# Patient Record
Sex: FEMALE | Race: WHITE | NOT HISPANIC OR LATINO | Employment: OTHER | RURAL
[De-identification: names, ages, dates, MRNs, and addresses within clinical notes are randomized per-mention and may not be internally consistent; named-entity substitution may affect disease eponyms.]

---

## 2019-12-10 ENCOUNTER — HISTORICAL (OUTPATIENT)
Dept: ADMINISTRATIVE | Facility: HOSPITAL | Age: 59
End: 2019-12-10

## 2019-12-11 LAB
LAB AP CLINICAL INFORMATION: NORMAL
LAB AP DIAGNOSIS - HISTORICAL: NORMAL
LAB AP GROSS PATHOLOGY - HISTORICAL: NORMAL
LAB AP SPECIMEN SUBMITTED - HISTORICAL: NORMAL

## 2020-02-25 ENCOUNTER — HISTORICAL (OUTPATIENT)
Dept: ADMINISTRATIVE | Facility: HOSPITAL | Age: 60
End: 2020-02-25

## 2020-02-25 LAB — PAP SMEAR: NORMAL

## 2020-02-27 LAB
LAB AP CLINICAL INFORMATION: NORMAL
LAB AP COMMENTS: NORMAL
LAB AP GENERAL CAT - HISTORICAL: NORMAL
LAB AP INTERPRETATION/RESULT - HISTORICAL: NEGATIVE
LAB AP SPECIMEN ADEQUACY - HISTORICAL: NORMAL
LAB AP SPECIMEN SUBMITTED - HISTORICAL: NORMAL

## 2020-03-16 ENCOUNTER — HISTORICAL (OUTPATIENT)
Dept: ADMINISTRATIVE | Facility: HOSPITAL | Age: 60
End: 2020-03-16

## 2020-03-18 LAB
REPORT: 25
REPORT: NORMAL

## 2020-03-31 ENCOUNTER — HISTORICAL (OUTPATIENT)
Dept: ADMINISTRATIVE | Facility: HOSPITAL | Age: 60
End: 2020-03-31

## 2020-10-15 ENCOUNTER — HISTORICAL (OUTPATIENT)
Dept: ADMINISTRATIVE | Facility: HOSPITAL | Age: 60
End: 2020-10-15

## 2020-10-15 LAB
ALBUMIN SERPL BCP-MCNC: 4 G/DL (ref 3.5–5)
ALBUMIN/GLOB SERPL: 0.9 {RATIO}
ALP SERPL-CCNC: 126 U/L (ref 46–118)
ALT SERPL W P-5'-P-CCNC: 18 U/L (ref 13–56)
ANION GAP SERPL CALCULATED.3IONS-SCNC: 8.3 MMOL/L (ref 7–16)
AST SERPL W P-5'-P-CCNC: 14 U/L (ref 15–37)
BASOPHILS # BLD AUTO: 0.05 X10E3/UL (ref 0–0.2)
BASOPHILS NFR BLD AUTO: 0.8 % (ref 0–1)
BILIRUB SERPL-MCNC: 0.4 MG/DL (ref 0–1.2)
BUN SERPL-MCNC: 26 MG/DL (ref 7–18)
BUN/CREAT SERPL: 17
CALCIUM SERPL-MCNC: 9.1 MG/DL (ref 8.5–10.1)
CHLORIDE SERPL-SCNC: 107 MMOL/L (ref 98–107)
CHOLEST SERPL-MCNC: 175 MG/DL
CHOLEST/HDLC SERPL: 2.8 {RATIO}
CO2 SERPL-SCNC: 28 MMOL/L (ref 21–32)
CREAT SERPL-MCNC: 1.54 MG/DL (ref 0.5–1.02)
EOSINOPHIL # BLD AUTO: 0.18 X10E3/UL (ref 0–0.5)
EOSINOPHIL NFR BLD AUTO: 2.8 % (ref 1–4)
ERYTHROCYTE [DISTWIDTH] IN BLOOD BY AUTOMATED COUNT: 13.2 % (ref 11.5–14.5)
GLOBULIN SER-MCNC: 4.4 G/DL (ref 2–4)
GLUCOSE SERPL-MCNC: 91 MG/DL (ref 74–106)
HCT VFR BLD AUTO: 40.7 % (ref 38–47)
HDLC SERPL-MCNC: 62 MG/DL
HGB BLD-MCNC: 12.7 G/DL (ref 12–16)
IMM GRANULOCYTES # BLD AUTO: 0.02 X10E3/UL (ref 0–0.04)
IMM GRANULOCYTES NFR BLD: 0.3 % (ref 0–0.4)
LDLC SERPL CALC-MCNC: 96 MG/DL
LYMPHOCYTES # BLD AUTO: 1.89 X10E3/UL (ref 1–4.8)
LYMPHOCYTES NFR BLD AUTO: 29.3 % (ref 27–41)
MAGNESIUM SERPL-MCNC: 2.5 MG/DL (ref 1.7–2.3)
MCH RBC QN AUTO: 26.9 PG (ref 27–31)
MCHC RBC AUTO-ENTMCNC: 31.2 G/DL (ref 32–36)
MCV RBC AUTO: 86.2 FL (ref 80–96)
MONOCYTES # BLD AUTO: 0.69 X10E3/UL (ref 0–0.8)
MONOCYTES NFR BLD AUTO: 10.7 % (ref 2–6)
MPC BLD CALC-MCNC: 9.9 FL (ref 9.4–12.4)
NEUTROPHILS # BLD AUTO: 3.63 X10E3/UL (ref 1.8–7.7)
NEUTROPHILS NFR BLD AUTO: 56.1 % (ref 53–65)
NRBC # BLD AUTO: 0 X10E3/UL (ref 0–0)
NRBC, AUTO (.00): 0 /100 (ref 0–0)
PLATELET # BLD AUTO: 299 X10E3/UL (ref 150–400)
POTASSIUM SERPL-SCNC: 5.3 MMOL/L (ref 3.5–5.1)
PROT SERPL-MCNC: 8.4 G/DL (ref 6.4–8.2)
RBC # BLD AUTO: 4.72 X10E6/UL (ref 4.2–5.4)
SODIUM SERPL-SCNC: 138 MMOL/L (ref 136–145)
TRIGL SERPL-MCNC: 87 MG/DL
WBC # BLD AUTO: 6.46 X10E3/UL (ref 4.5–11)

## 2020-11-09 ENCOUNTER — HISTORICAL (OUTPATIENT)
Dept: ADMINISTRATIVE | Facility: HOSPITAL | Age: 60
End: 2020-11-09

## 2021-02-22 ENCOUNTER — HISTORICAL (OUTPATIENT)
Dept: ADMINISTRATIVE | Facility: HOSPITAL | Age: 61
End: 2021-02-22

## 2021-03-25 ENCOUNTER — TELEPHONE (OUTPATIENT)
Dept: DERMATOLOGY | Facility: CLINIC | Age: 61
End: 2021-03-25

## 2021-03-25 RX ORDER — PREDNISONE 10 MG/1
TABLET ORAL
Qty: 30 TABLET | Refills: 0 | Status: SHIPPED | OUTPATIENT
Start: 2021-03-25 | End: 2021-06-11

## 2021-03-31 ENCOUNTER — DOCUMENTATION ONLY (OUTPATIENT)
Dept: DERMATOLOGY | Facility: CLINIC | Age: 61
End: 2021-03-31

## 2021-04-08 RX ORDER — CALCIPOTRIENE AND BETAMETHASONE DIPROPIONATE 50; .5 UG/G; MG/G
SUSPENSION TOPICAL
Qty: 120 G | Refills: 3 | Status: SHIPPED | OUTPATIENT
Start: 2021-04-08 | End: 2021-06-16 | Stop reason: CLARIF

## 2021-04-13 DIAGNOSIS — L30.9 DERMATITIS, UNSPECIFIED: Primary | ICD-10-CM

## 2021-04-13 RX ORDER — CALCIPOTRIENE, BETAMETHASONE DIPROPIONATE 50; .643 UG/G; MG/G
OINTMENT TOPICAL
Qty: 60 G | Refills: 2 | Status: SHIPPED | OUTPATIENT
Start: 2021-04-13 | End: 2021-08-23

## 2021-04-14 RX ORDER — BENAZEPRIL HYDROCHLORIDE 20 MG/1
20 TABLET ORAL DAILY
COMMUNITY
End: 2021-04-14 | Stop reason: SDUPTHER

## 2021-04-14 RX ORDER — OMEPRAZOLE 20 MG/1
20 CAPSULE, DELAYED RELEASE ORAL DAILY
COMMUNITY
End: 2021-04-14 | Stop reason: SDUPTHER

## 2021-04-15 RX ORDER — OMEPRAZOLE 20 MG/1
20 CAPSULE, DELAYED RELEASE ORAL DAILY
Qty: 90 CAPSULE | Refills: 1 | Status: SHIPPED | OUTPATIENT
Start: 2021-04-15 | End: 2021-10-04

## 2021-04-15 RX ORDER — BENAZEPRIL HYDROCHLORIDE 20 MG/1
20 TABLET ORAL DAILY
Qty: 90 TABLET | Refills: 1 | Status: SHIPPED | OUTPATIENT
Start: 2021-04-15 | End: 2021-09-16

## 2021-04-23 RX ORDER — EZETIMIBE 10 MG/1
10 TABLET ORAL NIGHTLY
COMMUNITY
End: 2021-04-23 | Stop reason: SDUPTHER

## 2021-04-23 RX ORDER — EZETIMIBE 10 MG/1
10 TABLET ORAL NIGHTLY
Qty: 90 TABLET | Refills: 1 | Status: SHIPPED | OUTPATIENT
Start: 2021-04-23 | End: 2021-10-28 | Stop reason: SDUPTHER

## 2021-05-27 ENCOUNTER — OFFICE VISIT (OUTPATIENT)
Dept: FAMILY MEDICINE | Facility: CLINIC | Age: 61
End: 2021-05-27
Payer: COMMERCIAL

## 2021-05-27 VITALS
SYSTOLIC BLOOD PRESSURE: 104 MMHG | OXYGEN SATURATION: 98 % | HEART RATE: 91 BPM | HEIGHT: 67 IN | RESPIRATION RATE: 18 BRPM | DIASTOLIC BLOOD PRESSURE: 78 MMHG | WEIGHT: 207 LBS | BODY MASS INDEX: 32.49 KG/M2

## 2021-05-27 DIAGNOSIS — E78.5 HYPERLIPIDEMIA, UNSPECIFIED HYPERLIPIDEMIA TYPE: ICD-10-CM

## 2021-05-27 DIAGNOSIS — N95.1 SWEATS, MENOPAUSAL: ICD-10-CM

## 2021-05-27 DIAGNOSIS — L60.0 INGROWN TOENAIL OF LEFT FOOT: ICD-10-CM

## 2021-05-27 DIAGNOSIS — K21.9 GASTROESOPHAGEAL REFLUX DISEASE, UNSPECIFIED WHETHER ESOPHAGITIS PRESENT: ICD-10-CM

## 2021-05-27 DIAGNOSIS — I10 HYPERTENSION, UNSPECIFIED TYPE: Primary | ICD-10-CM

## 2021-05-27 PROCEDURE — 99213 OFFICE O/P EST LOW 20 MIN: CPT | Mod: ,,, | Performed by: FAMILY MEDICINE

## 2021-05-27 PROCEDURE — 99213 PR OFFICE/OUTPT VISIT, EST, LEVL III, 20-29 MIN: ICD-10-PCS | Mod: ,,, | Performed by: FAMILY MEDICINE

## 2021-05-27 RX ORDER — TRAZODONE HYDROCHLORIDE 50 MG/1
TABLET ORAL
COMMUNITY
Start: 2021-05-01 | End: 2021-10-26

## 2021-05-27 RX ORDER — AMLODIPINE BESYLATE 2.5 MG/1
2.5 TABLET ORAL DAILY
COMMUNITY
Start: 2021-05-13 | End: 2021-11-15

## 2021-05-27 RX ORDER — TIZANIDINE 4 MG/1
4 TABLET ORAL 2 TIMES DAILY
COMMUNITY
End: 2021-07-06 | Stop reason: SDUPTHER

## 2021-05-27 RX ORDER — OXYCODONE AND ACETAMINOPHEN 10; 325 MG/1; MG/1
1 TABLET ORAL EVERY 8 HOURS
COMMUNITY
End: 2021-06-16

## 2021-05-27 RX ORDER — BUPROPION HYDROCHLORIDE 100 MG/1
100 TABLET ORAL 2 TIMES DAILY
COMMUNITY
End: 2021-06-07 | Stop reason: SDUPTHER

## 2021-05-27 RX ORDER — USTEKINUMAB 45 MG/.5ML
INJECTION, SOLUTION SUBCUTANEOUS
COMMUNITY
Start: 2021-05-26 | End: 2022-07-29

## 2021-06-07 ENCOUNTER — TELEPHONE (OUTPATIENT)
Dept: FAMILY MEDICINE | Facility: CLINIC | Age: 61
End: 2021-06-07

## 2021-06-07 RX ORDER — TRIAMCINOLONE ACETONIDE 1 MG/G
CREAM TOPICAL
COMMUNITY
End: 2021-06-16

## 2021-06-07 RX ORDER — BETAMETHASONE DIPROPIONATE 0.5 MG/G
OINTMENT, AUGMENTED TOPICAL
COMMUNITY
End: 2021-06-16

## 2021-06-07 RX ORDER — BUPROPION HYDROCHLORIDE 100 MG/1
100 TABLET ORAL 2 TIMES DAILY
Qty: 180 TABLET | Refills: 1 | Status: SHIPPED | OUTPATIENT
Start: 2021-06-07 | End: 2021-06-11 | Stop reason: SDUPTHER

## 2021-06-07 RX ORDER — VALACYCLOVIR HYDROCHLORIDE 1 G/1
TABLET, FILM COATED ORAL
COMMUNITY
Start: 2021-02-10 | End: 2021-08-09

## 2021-06-07 RX ORDER — SECUKINUMAB 150 MG/ML
INJECTION SUBCUTANEOUS
COMMUNITY
Start: 2021-02-01 | End: 2021-06-16

## 2021-06-07 RX ORDER — MELATONIN 3 MG
CAPSULE ORAL
COMMUNITY
End: 2021-10-26

## 2021-06-07 RX ORDER — DESONIDE 0.5 MG/G
CREAM TOPICAL
COMMUNITY
End: 2022-02-09 | Stop reason: ALTCHOICE

## 2021-06-07 RX ORDER — IBUPROFEN 800 MG/1
800 TABLET ORAL
COMMUNITY
End: 2021-06-16

## 2021-06-07 RX ORDER — CALCIPOTRIENE 50 UG/G
OINTMENT TOPICAL
COMMUNITY
Start: 2020-12-24 | End: 2021-06-16

## 2021-06-07 RX ORDER — BUPRENORPHINE HYDROCHLORIDE AND NALOXONE HYDROCHLORIDE DIHYDRATE 2; .5 MG/1; MG/1
TABLET SUBLINGUAL EVERY 6 HOURS PRN
COMMUNITY
End: 2021-06-16

## 2021-06-07 RX ORDER — HYDROCODONE BITARTRATE AND ACETAMINOPHEN 10; 325 MG/1; MG/1
1 TABLET ORAL EVERY 6 HOURS PRN
COMMUNITY
End: 2021-06-11

## 2021-06-07 RX ORDER — DOXYCYCLINE 100 MG/1
CAPSULE ORAL
COMMUNITY
Start: 2021-03-10 | End: 2021-06-16

## 2021-06-07 RX ORDER — GABAPENTIN 300 MG/1
300 CAPSULE ORAL
COMMUNITY
End: 2021-06-16

## 2021-06-07 RX ORDER — FAMOTIDINE 20 MG/1
TABLET, FILM COATED ORAL
COMMUNITY
End: 2021-10-26

## 2021-06-07 RX ORDER — ONDANSETRON 4 MG/1
4 TABLET, FILM COATED ORAL 3 TIMES DAILY PRN
COMMUNITY
Start: 2021-01-31 | End: 2021-12-02

## 2021-06-07 RX ORDER — CLOBETASOL PROPIONATE 0.5 MG/G
OINTMENT TOPICAL
COMMUNITY
Start: 2020-07-07 | End: 2021-06-16

## 2021-06-09 ENCOUNTER — OFFICE VISIT (OUTPATIENT)
Dept: DERMATOLOGY | Facility: CLINIC | Age: 61
End: 2021-06-09
Payer: COMMERCIAL

## 2021-06-09 VITALS — WEIGHT: 200 LBS | RESPIRATION RATE: 18 BRPM | BODY MASS INDEX: 31.39 KG/M2 | HEIGHT: 67 IN

## 2021-06-09 DIAGNOSIS — L23.89 ALLERGIC CONTACT DERMATITIS DUE TO OTHER AGENTS: Primary | ICD-10-CM

## 2021-06-09 DIAGNOSIS — L28.1 PRURIGO NODULARIS: ICD-10-CM

## 2021-06-09 DIAGNOSIS — L40.9 PSORIASIS: ICD-10-CM

## 2021-06-09 PROCEDURE — 99213 PR OFFICE/OUTPT VISIT, EST, LEVL III, 20-29 MIN: ICD-10-PCS | Mod: ,,, | Performed by: DERMATOLOGY

## 2021-06-09 PROCEDURE — 99213 OFFICE O/P EST LOW 20 MIN: CPT | Mod: ,,, | Performed by: DERMATOLOGY

## 2021-06-11 ENCOUNTER — OFFICE VISIT (OUTPATIENT)
Dept: FAMILY MEDICINE | Facility: CLINIC | Age: 61
End: 2021-06-11
Payer: COMMERCIAL

## 2021-06-11 VITALS
HEART RATE: 85 BPM | OXYGEN SATURATION: 99 % | SYSTOLIC BLOOD PRESSURE: 130 MMHG | DIASTOLIC BLOOD PRESSURE: 88 MMHG | RESPIRATION RATE: 20 BRPM | WEIGHT: 200 LBS | BODY MASS INDEX: 31.39 KG/M2 | HEIGHT: 67 IN

## 2021-06-11 DIAGNOSIS — F32.A DEPRESSION, UNSPECIFIED DEPRESSION TYPE: Primary | ICD-10-CM

## 2021-06-11 DIAGNOSIS — I10 HYPERTENSION, UNSPECIFIED TYPE: ICD-10-CM

## 2021-06-11 DIAGNOSIS — L29.9 PRURITUS: ICD-10-CM

## 2021-06-11 PROCEDURE — 99213 OFFICE O/P EST LOW 20 MIN: CPT | Mod: ,,, | Performed by: FAMILY MEDICINE

## 2021-06-11 PROCEDURE — 99213 PR OFFICE/OUTPT VISIT, EST, LEVL III, 20-29 MIN: ICD-10-PCS | Mod: ,,, | Performed by: FAMILY MEDICINE

## 2021-06-11 RX ORDER — BUPROPION HYDROCHLORIDE 100 MG/1
100 TABLET ORAL 2 TIMES DAILY
Qty: 180 TABLET | Refills: 1 | Status: SHIPPED | OUTPATIENT
Start: 2021-06-11 | End: 2023-01-30 | Stop reason: SDUPTHER

## 2021-06-11 RX ORDER — HYDROXYZINE PAMOATE 25 MG/1
CAPSULE ORAL
Qty: 60 CAPSULE | Refills: 0 | Status: SHIPPED | OUTPATIENT
Start: 2021-06-11 | End: 2021-08-09

## 2021-06-16 ENCOUNTER — OFFICE VISIT (OUTPATIENT)
Dept: FAMILY MEDICINE | Facility: CLINIC | Age: 61
End: 2021-06-16
Payer: COMMERCIAL

## 2021-06-16 VITALS
WEIGHT: 200 LBS | SYSTOLIC BLOOD PRESSURE: 136 MMHG | BODY MASS INDEX: 31.39 KG/M2 | HEART RATE: 89 BPM | TEMPERATURE: 98 F | RESPIRATION RATE: 18 BRPM | HEIGHT: 67 IN | DIASTOLIC BLOOD PRESSURE: 95 MMHG | OXYGEN SATURATION: 100 %

## 2021-06-16 DIAGNOSIS — J40 BRONCHITIS: Primary | ICD-10-CM

## 2021-06-16 PROCEDURE — 96372 THER/PROPH/DIAG INJ SC/IM: CPT | Mod: ,,, | Performed by: NURSE PRACTITIONER

## 2021-06-16 PROCEDURE — 96372 PR INJECTION,THERAP/PROPH/DIAG2ST, IM OR SUBCUT: ICD-10-PCS | Mod: ,,, | Performed by: NURSE PRACTITIONER

## 2021-06-16 PROCEDURE — 99213 PR OFFICE/OUTPT VISIT, EST, LEVL III, 20-29 MIN: ICD-10-PCS | Mod: 25,,, | Performed by: NURSE PRACTITIONER

## 2021-06-16 PROCEDURE — 99213 OFFICE O/P EST LOW 20 MIN: CPT | Mod: 25,,, | Performed by: NURSE PRACTITIONER

## 2021-06-16 RX ORDER — AZITHROMYCIN 250 MG/1
TABLET, FILM COATED ORAL
Qty: 6 TABLET | Refills: 0 | Status: SHIPPED | OUTPATIENT
Start: 2021-06-16 | End: 2021-06-21

## 2021-06-16 RX ORDER — HYDROCODONE POLISTIREX AND CHLORPHENIRAMINE POLISTIREX 10; 8 MG/5ML; MG/5ML
5 SUSPENSION, EXTENDED RELEASE ORAL EVERY 12 HOURS PRN
Qty: 115 ML | Refills: 0 | Status: SHIPPED | OUTPATIENT
Start: 2021-06-16 | End: 2021-06-21

## 2021-06-16 RX ORDER — ALBUTEROL SULFATE 90 UG/1
2 AEROSOL, METERED RESPIRATORY (INHALATION) EVERY 6 HOURS PRN
Qty: 18 G | Refills: 0 | Status: SHIPPED | OUTPATIENT
Start: 2021-06-16 | End: 2021-10-26

## 2021-06-16 RX ORDER — BETAMETHASONE SODIUM PHOSPHATE AND BETAMETHASONE ACETATE 3; 3 MG/ML; MG/ML
3 INJECTION, SUSPENSION INTRA-ARTICULAR; INTRALESIONAL; INTRAMUSCULAR; SOFT TISSUE
Status: COMPLETED | OUTPATIENT
Start: 2021-06-16 | End: 2021-06-16

## 2021-06-16 RX ORDER — BETAMETHASONE SODIUM PHOSPHATE AND BETAMETHASONE ACETATE 3; 3 MG/ML; MG/ML
3 INJECTION, SUSPENSION INTRA-ARTICULAR; INTRALESIONAL; INTRAMUSCULAR; SOFT TISSUE ONCE
Qty: 0.5 ML | Refills: 0 | Status: SHIPPED | OUTPATIENT
Start: 2021-06-16 | End: 2021-06-16 | Stop reason: CLARIF

## 2021-06-16 RX ADMIN — BETAMETHASONE SODIUM PHOSPHATE AND BETAMETHASONE ACETATE 3 MG: 3; 3 INJECTION, SUSPENSION INTRA-ARTICULAR; INTRALESIONAL; INTRAMUSCULAR; SOFT TISSUE at 09:06

## 2021-06-17 ENCOUNTER — OFFICE VISIT (OUTPATIENT)
Dept: SURGERY | Facility: CLINIC | Age: 61
End: 2021-06-17
Payer: COMMERCIAL

## 2021-06-17 DIAGNOSIS — L60.0 INGROWN TOENAIL OF LEFT FOOT: Primary | ICD-10-CM

## 2021-06-17 PROCEDURE — 99213 OFFICE O/P EST LOW 20 MIN: CPT | Mod: PBBFAC | Performed by: SURGERY

## 2021-06-17 PROCEDURE — 99214 PR OFFICE/OUTPT VISIT, EST, LEVL IV, 30-39 MIN: ICD-10-PCS | Mod: S$PBB,,, | Performed by: SURGERY

## 2021-06-17 PROCEDURE — 99214 OFFICE O/P EST MOD 30 MIN: CPT | Mod: S$PBB,,, | Performed by: SURGERY

## 2021-06-24 ENCOUNTER — OFFICE VISIT (OUTPATIENT)
Dept: SURGERY | Facility: CLINIC | Age: 61
End: 2021-06-24
Attending: SURGERY
Payer: COMMERCIAL

## 2021-06-24 DIAGNOSIS — L60.0 INGROWN TOENAIL OF LEFT FOOT: Primary | ICD-10-CM

## 2021-06-24 PROCEDURE — 99213 OFFICE O/P EST LOW 20 MIN: CPT | Mod: PBBFAC,25 | Performed by: SURGERY

## 2021-06-24 PROCEDURE — 11730 PR REMOVAL OF NAIL PLATE: ICD-10-PCS | Mod: S$PBB,TA,, | Performed by: SURGERY

## 2021-06-24 PROCEDURE — 11730 AVULSION NAIL PLATE SIMPLE 1: CPT | Mod: PBBFAC | Performed by: SURGERY

## 2021-06-24 PROCEDURE — 11730 AVULSION NAIL PLATE SIMPLE 1: CPT | Mod: S$PBB,TA,, | Performed by: SURGERY

## 2021-06-24 RX ORDER — MUPIROCIN 20 MG/G
OINTMENT TOPICAL 3 TIMES DAILY
Qty: 1 TUBE | Refills: 0 | Status: SHIPPED | OUTPATIENT
Start: 2021-06-24 | End: 2022-07-13 | Stop reason: SDUPTHER

## 2021-07-06 RX ORDER — TIZANIDINE 4 MG/1
4 TABLET ORAL 2 TIMES DAILY
Qty: 90 TABLET | Refills: 1 | Status: SHIPPED | OUTPATIENT
Start: 2021-07-06 | End: 2021-10-04

## 2021-09-09 ENCOUNTER — OFFICE VISIT (OUTPATIENT)
Dept: FAMILY MEDICINE | Facility: CLINIC | Age: 61
End: 2021-09-09
Payer: COMMERCIAL

## 2021-09-09 VITALS
RESPIRATION RATE: 18 BRPM | HEIGHT: 67 IN | SYSTOLIC BLOOD PRESSURE: 114 MMHG | WEIGHT: 208 LBS | OXYGEN SATURATION: 100 % | DIASTOLIC BLOOD PRESSURE: 81 MMHG | HEART RATE: 62 BPM | TEMPERATURE: 97 F | BODY MASS INDEX: 32.65 KG/M2

## 2021-09-09 DIAGNOSIS — K08.89 PAIN, DENTAL: ICD-10-CM

## 2021-09-09 DIAGNOSIS — K05.10 GINGIVITIS: Primary | ICD-10-CM

## 2021-09-09 PROCEDURE — 99213 OFFICE O/P EST LOW 20 MIN: CPT | Mod: ,,, | Performed by: NURSE PRACTITIONER

## 2021-09-09 PROCEDURE — 99213 PR OFFICE/OUTPT VISIT, EST, LEVL III, 20-29 MIN: ICD-10-PCS | Mod: ,,, | Performed by: NURSE PRACTITIONER

## 2021-09-09 RX ORDER — AMOXICILLIN 875 MG/1
875 TABLET, FILM COATED ORAL EVERY 12 HOURS
Qty: 20 TABLET | Refills: 0 | Status: SHIPPED | OUTPATIENT
Start: 2021-09-09 | End: 2021-09-19

## 2021-09-23 ENCOUNTER — OFFICE VISIT (OUTPATIENT)
Dept: SURGERY | Facility: CLINIC | Age: 61
End: 2021-09-23
Attending: SURGERY
Payer: COMMERCIAL

## 2021-09-23 DIAGNOSIS — L60.0 INGROWN TOENAIL: Primary | ICD-10-CM

## 2021-09-23 PROCEDURE — 99213 OFFICE O/P EST LOW 20 MIN: CPT | Mod: S$PBB,,, | Performed by: SURGERY

## 2021-09-23 PROCEDURE — 99213 PR OFFICE/OUTPT VISIT, EST, LEVL III, 20-29 MIN: ICD-10-PCS | Mod: S$PBB,,, | Performed by: SURGERY

## 2021-09-23 PROCEDURE — 99214 OFFICE O/P EST MOD 30 MIN: CPT | Mod: PBBFAC | Performed by: SURGERY

## 2021-10-25 DIAGNOSIS — E78.5 HYPERLIPIDEMIA, UNSPECIFIED HYPERLIPIDEMIA TYPE: Primary | ICD-10-CM

## 2021-10-25 DIAGNOSIS — Z79.899 OTHER LONG TERM (CURRENT) DRUG THERAPY: ICD-10-CM

## 2021-10-26 ENCOUNTER — OFFICE VISIT (OUTPATIENT)
Dept: FAMILY MEDICINE | Facility: CLINIC | Age: 61
End: 2021-10-26
Payer: COMMERCIAL

## 2021-10-26 VITALS
BODY MASS INDEX: 32.65 KG/M2 | OXYGEN SATURATION: 99 % | WEIGHT: 208 LBS | HEART RATE: 99 BPM | DIASTOLIC BLOOD PRESSURE: 74 MMHG | SYSTOLIC BLOOD PRESSURE: 115 MMHG | RESPIRATION RATE: 18 BRPM | TEMPERATURE: 98 F | HEIGHT: 67 IN

## 2021-10-26 DIAGNOSIS — Z78.9 STATIN INTOLERANCE: ICD-10-CM

## 2021-10-26 DIAGNOSIS — N18.32 STAGE 3B CHRONIC KIDNEY DISEASE: ICD-10-CM

## 2021-10-26 DIAGNOSIS — E78.5 HYPERLIPIDEMIA, UNSPECIFIED HYPERLIPIDEMIA TYPE: Primary | ICD-10-CM

## 2021-10-26 PROCEDURE — 99213 PR OFFICE/OUTPT VISIT, EST, LEVL III, 20-29 MIN: ICD-10-PCS | Mod: ,,, | Performed by: NURSE PRACTITIONER

## 2021-10-26 PROCEDURE — 99213 OFFICE O/P EST LOW 20 MIN: CPT | Mod: ,,, | Performed by: NURSE PRACTITIONER

## 2021-10-28 ENCOUNTER — OFFICE VISIT (OUTPATIENT)
Dept: SURGERY | Facility: CLINIC | Age: 61
End: 2021-10-28
Attending: SURGERY
Payer: COMMERCIAL

## 2021-10-28 DIAGNOSIS — L60.0 INGROWN TOENAIL OF LEFT FOOT: Primary | ICD-10-CM

## 2021-10-28 PROCEDURE — 99213 OFFICE O/P EST LOW 20 MIN: CPT | Mod: PBBFAC | Performed by: SURGERY

## 2021-10-28 PROCEDURE — 99213 OFFICE O/P EST LOW 20 MIN: CPT | Mod: S$PBB,,, | Performed by: SURGERY

## 2021-10-28 PROCEDURE — 99213 PR OFFICE/OUTPT VISIT, EST, LEVL III, 20-29 MIN: ICD-10-PCS | Mod: S$PBB,,, | Performed by: SURGERY

## 2021-12-02 ENCOUNTER — OFFICE VISIT (OUTPATIENT)
Dept: FAMILY MEDICINE | Facility: CLINIC | Age: 61
End: 2021-12-02
Payer: COMMERCIAL

## 2021-12-02 VITALS
OXYGEN SATURATION: 98 % | RESPIRATION RATE: 18 BRPM | HEART RATE: 135 BPM | DIASTOLIC BLOOD PRESSURE: 83 MMHG | SYSTOLIC BLOOD PRESSURE: 125 MMHG | TEMPERATURE: 99 F | HEIGHT: 67 IN | BODY MASS INDEX: 32.65 KG/M2 | WEIGHT: 208 LBS

## 2021-12-02 DIAGNOSIS — J02.9 PHARYNGITIS, UNSPECIFIED ETIOLOGY: Primary | ICD-10-CM

## 2021-12-02 PROCEDURE — 99213 PR OFFICE/OUTPT VISIT, EST, LEVL III, 20-29 MIN: ICD-10-PCS | Mod: ,,, | Performed by: NURSE PRACTITIONER

## 2021-12-02 PROCEDURE — 99213 OFFICE O/P EST LOW 20 MIN: CPT | Mod: ,,, | Performed by: NURSE PRACTITIONER

## 2021-12-02 RX ORDER — CEFUROXIME AXETIL 500 MG/1
500 TABLET ORAL EVERY 12 HOURS
Qty: 14 TABLET | Refills: 0 | Status: SHIPPED | OUTPATIENT
Start: 2021-12-02 | End: 2021-12-09

## 2021-12-02 RX ORDER — FLUCONAZOLE 150 MG/1
150 TABLET ORAL DAILY
Qty: 7 TABLET | Refills: 0 | Status: SHIPPED | OUTPATIENT
Start: 2021-12-02 | End: 2021-12-09

## 2022-01-28 ENCOUNTER — OFFICE VISIT (OUTPATIENT)
Dept: FAMILY MEDICINE | Facility: CLINIC | Age: 62
End: 2022-01-28
Payer: COMMERCIAL

## 2022-01-28 VITALS — OXYGEN SATURATION: 98 % | WEIGHT: 200 LBS | HEIGHT: 67 IN | BODY MASS INDEX: 31.39 KG/M2 | TEMPERATURE: 99 F

## 2022-01-28 DIAGNOSIS — J40 BRONCHITIS: ICD-10-CM

## 2022-01-28 DIAGNOSIS — R05.9 COUGH: Primary | ICD-10-CM

## 2022-01-28 LAB
CTP QC/QA: YES
SARS-COV-2 AG RESP QL IA.RAPID: NEGATIVE

## 2022-01-28 PROCEDURE — 3008F BODY MASS INDEX DOCD: CPT | Mod: ,,, | Performed by: FAMILY MEDICINE

## 2022-01-28 PROCEDURE — 99213 PR OFFICE/OUTPT VISIT, EST, LEVL III, 20-29 MIN: ICD-10-PCS | Mod: ,,, | Performed by: FAMILY MEDICINE

## 2022-01-28 PROCEDURE — 3008F PR BODY MASS INDEX (BMI) DOCUMENTED: ICD-10-PCS | Mod: ,,, | Performed by: FAMILY MEDICINE

## 2022-01-28 PROCEDURE — 1160F RVW MEDS BY RX/DR IN RCRD: CPT | Mod: ,,, | Performed by: FAMILY MEDICINE

## 2022-01-28 PROCEDURE — 99213 OFFICE O/P EST LOW 20 MIN: CPT | Mod: ,,, | Performed by: FAMILY MEDICINE

## 2022-01-28 PROCEDURE — 1160F PR REVIEW ALL MEDS BY PRESCRIBER/CLIN PHARMACIST DOCUMENTED: ICD-10-PCS | Mod: ,,, | Performed by: FAMILY MEDICINE

## 2022-01-28 PROCEDURE — 1159F PR MEDICATION LIST DOCUMENTED IN MEDICAL RECORD: ICD-10-PCS | Mod: ,,, | Performed by: FAMILY MEDICINE

## 2022-01-28 PROCEDURE — 1159F MED LIST DOCD IN RCRD: CPT | Mod: ,,, | Performed by: FAMILY MEDICINE

## 2022-01-28 PROCEDURE — 87426 SARSCOV CORONAVIRUS AG IA: CPT | Mod: QW,,, | Performed by: FAMILY MEDICINE

## 2022-01-28 PROCEDURE — 87426 SARS CORONAVIRUS 2 ANTIGEN POCT: ICD-10-PCS | Mod: QW,,, | Performed by: FAMILY MEDICINE

## 2022-01-28 RX ORDER — PROMETHAZINE HYDROCHLORIDE AND DEXTROMETHORPHAN HYDROBROMIDE 6.25; 15 MG/5ML; MG/5ML
5 SYRUP ORAL NIGHTLY PRN
Qty: 240 ML | Refills: 1 | Status: SHIPPED | OUTPATIENT
Start: 2022-01-28 | End: 2022-02-07

## 2022-01-28 RX ORDER — AZITHROMYCIN 250 MG/1
TABLET, FILM COATED ORAL
Qty: 6 TABLET | Refills: 0 | Status: SHIPPED | OUTPATIENT
Start: 2022-01-28 | End: 2022-02-02

## 2022-01-28 NOTE — PROGRESS NOTES
Janene Castillo is a 61 y.o. female seen today for a car visit.  Patient reports she was positive for COVID at home last week and continues to have chest congestion, cough nasal congestion postnasal drainage and malaise.  She is now currently afebrile.  Her rapid COVID test here in the office today was negative.  Past Medical History:   Diagnosis Date    Allergic contact dermatitis     balsam of peru, bacitracin, fragrance, methyldibromo glutaronite    HTN (hypertension)     Plaque psoriasis     Prurigo nodularis     Psoriatic arthritis      Family History   Problem Relation Age of Onset    Melanoma Neg Hx      Current Outpatient Medications on File Prior to Visit   Medication Sig Dispense Refill    amLODIPine (NORVASC) 2.5 MG tablet Take 1 tablet by mouth once daily 90 tablet 0    benazepriL (LOTENSIN) 20 MG tablet Take 1 tablet by mouth once daily 90 tablet 0    buPROPion (WELLBUTRIN) 100 MG tablet Take 1 tablet (100 mg total) by mouth 2 (two) times daily. 180 tablet 1    calcipotriene-betamethasone (TACLONEX) ointment APPLY OINTMENT TO RASH ON BODY TWICE DAILY TAPERING WITH IMPROVEMENT. 60 g 0    desonide (DESOWEN) 0.05 % cream Apply topically.      ezetimibe (ZETIA) 10 mg tablet TAKE 1 TABLET BY MOUTH ONCE DAILY IN THE EVENING 90 tablet 0    hydrOXYzine pamoate (VISTARIL) 25 MG Cap TAKE 1 TO 2 CAPSULES BY MOUTH EVERY 8 HOURS AS NEEDED 60 capsule 0    mupirocin (BACTROBAN) 2 % ointment Apply topically 3 (three) times daily. 1 Tube 0    omeprazole (PRILOSEC) 20 MG capsule Take 1 capsule by mouth once daily 90 capsule 0    STELARA 45 mg/0.5 mL Syrg syringe       tiZANidine (ZANAFLEX) 4 MG tablet Take 1 tablet by mouth twice daily 90 tablet 0    valACYclovir (VALTREX) 1000 MG tablet TAKE 2 TABLETS BY MOUTH TWICE DAILY FOR COLD SYMPTOMS 4 tablet 0    valACYclovir (VALTREX) 1000 MG tablet TAKE 2 TABLETS BY MOUTH TWICE DAILY FOR COLD SYMPTOMS 4 tablet 0     No current facility-administered medications  on file prior to visit.       There is no immunization history on file for this patient.    Review of Systems   Constitutional: Positive for malaise/fatigue. Negative for fever.   HENT: Positive for congestion.    Respiratory: Positive for cough and sputum production. Negative for wheezing.    Musculoskeletal: Positive for myalgias.        Vitals:    01/28/22 0902   Temp: 98.8 °F (37.1 °C)       Physical Exam  Constitutional:       Appearance: Normal appearance.   Eyes:      Conjunctiva/sclera: Conjunctivae normal.   Pulmonary:      Effort: Pulmonary effort is normal.   Neurological:      Mental Status: She is alert.   Psychiatric:         Mood and Affect: Mood normal.         Behavior: Behavior normal.         Thought Content: Thought content normal.         Judgment: Judgment normal.          Assessment and Plan  Cough  -     SARS Coronavirus 2 Antigen, POCT  -     promethazine-dextromethorphan (PROMETHAZINE-DM) 6.25-15 mg/5 mL Syrp; Take 5 mLs by mouth nightly as needed.  Dispense: 240 mL; Refill: 1    Bronchitis  -     azithromycin (Z-RODO) 250 MG tablet; Take 2 tablets by mouth on day 1; Take 1 tablet by mouth on days 2-5  Dispense: 6 tablet; Refill: 0  -     promethazine-dextromethorphan (PROMETHAZINE-DM) 6.25-15 mg/5 mL Syrp; Take 5 mLs by mouth nightly as needed.  Dispense: 240 mL; Refill: 1            Return to clinic if symptoms worsen or persist.  I recommended she use her Flonase b.i.d. for the next 3 days, add Mucinex 1200 mg plain b.i.d., rest, and increase her fluid intake.  Patient has an albuterol inhaler and I recommended 2 puffs 3 times a day for the next 5 days or so.    Health Maintenance Topics with due status: Not Due       Topic Last Completion Date    Cervical Cancer Screening 02/25/2020    Colorectal Cancer Screening 12/22/2020    Lipid Panel 10/25/2021

## 2022-02-09 ENCOUNTER — OFFICE VISIT (OUTPATIENT)
Dept: FAMILY MEDICINE | Facility: CLINIC | Age: 62
End: 2022-02-09
Payer: MEDICARE

## 2022-02-09 VITALS
SYSTOLIC BLOOD PRESSURE: 120 MMHG | OXYGEN SATURATION: 97 % | DIASTOLIC BLOOD PRESSURE: 90 MMHG | HEART RATE: 94 BPM | WEIGHT: 200 LBS | BODY MASS INDEX: 31.39 KG/M2 | RESPIRATION RATE: 18 BRPM | HEIGHT: 67 IN | TEMPERATURE: 98 F

## 2022-02-09 DIAGNOSIS — K52.9 GASTROENTERITIS: Primary | ICD-10-CM

## 2022-02-09 DIAGNOSIS — I95.9 HYPOTENSION, UNSPECIFIED HYPOTENSION TYPE: ICD-10-CM

## 2022-02-09 PROCEDURE — 3074F PR MOST RECENT SYSTOLIC BLOOD PRESSURE < 130 MM HG: ICD-10-PCS | Mod: ,,, | Performed by: NURSE PRACTITIONER

## 2022-02-09 PROCEDURE — 99213 OFFICE O/P EST LOW 20 MIN: CPT | Mod: ,,, | Performed by: NURSE PRACTITIONER

## 2022-02-09 PROCEDURE — 3080F PR MOST RECENT DIASTOLIC BLOOD PRESSURE >= 90 MM HG: ICD-10-PCS | Mod: ,,, | Performed by: NURSE PRACTITIONER

## 2022-02-09 PROCEDURE — 1160F PR REVIEW ALL MEDS BY PRESCRIBER/CLIN PHARMACIST DOCUMENTED: ICD-10-PCS | Mod: ,,, | Performed by: NURSE PRACTITIONER

## 2022-02-09 PROCEDURE — 1159F PR MEDICATION LIST DOCUMENTED IN MEDICAL RECORD: ICD-10-PCS | Mod: ,,, | Performed by: NURSE PRACTITIONER

## 2022-02-09 PROCEDURE — 3008F PR BODY MASS INDEX (BMI) DOCUMENTED: ICD-10-PCS | Mod: ,,, | Performed by: NURSE PRACTITIONER

## 2022-02-09 PROCEDURE — 99213 PR OFFICE/OUTPT VISIT, EST, LEVL III, 20-29 MIN: ICD-10-PCS | Mod: ,,, | Performed by: NURSE PRACTITIONER

## 2022-02-09 PROCEDURE — 3080F DIAST BP >= 90 MM HG: CPT | Mod: ,,, | Performed by: NURSE PRACTITIONER

## 2022-02-09 PROCEDURE — 1160F RVW MEDS BY RX/DR IN RCRD: CPT | Mod: ,,, | Performed by: NURSE PRACTITIONER

## 2022-02-09 PROCEDURE — 3008F BODY MASS INDEX DOCD: CPT | Mod: ,,, | Performed by: NURSE PRACTITIONER

## 2022-02-09 PROCEDURE — 3074F SYST BP LT 130 MM HG: CPT | Mod: ,,, | Performed by: NURSE PRACTITIONER

## 2022-02-09 PROCEDURE — 1159F MED LIST DOCD IN RCRD: CPT | Mod: ,,, | Performed by: NURSE PRACTITIONER

## 2022-02-09 RX ORDER — VARENICLINE TARTRATE 1 MG/1
1 TABLET, FILM COATED ORAL 2 TIMES DAILY
COMMUNITY
Start: 2022-02-04 | End: 2022-04-11

## 2022-02-09 RX ORDER — OXYCODONE AND ACETAMINOPHEN 10; 325 MG/1; MG/1
1 TABLET ORAL EVERY 12 HOURS PRN
COMMUNITY
Start: 2022-02-03 | End: 2022-10-17

## 2022-02-09 RX ORDER — METHOTREXATE 25 MG/ML
INJECTION INTRA-ARTERIAL; INTRAMUSCULAR; INTRATHECAL; INTRAVENOUS
COMMUNITY
Start: 2022-01-10 | End: 2022-07-29

## 2022-02-09 NOTE — PROGRESS NOTES
"Subjective:       Patient ID: Janene Castillo is a 61 y.o. female.    Chief Complaint: Hospital Follow Up ([facility] for hypotension) and Loose Watery Stools (Reports started Monday after vomiting bile )    Ms. Castillo presents to clinic with complaints of low blood pressure this week while experiencing a gastroenteritis. She states she has been keeping her grandchildren who have had "stomach virus" and she contracted it as well, she states she had one diarrhea stool so far today, vomiting and nausea have resolved. She notes she went to ER for treatment 2 nights ago due to BP was very low, she reports she received 4 L of IV fluids in ER -no record available at this time. BP log reveals BP ranging in the 90-100s systolically.     Review of Systems   Constitutional: Negative for chills and fever.   Respiratory: Negative.    Cardiovascular: Negative.    Gastrointestinal: Positive for diarrhea. Negative for abdominal pain, nausea and vomiting.   Genitourinary: Negative.    Neurological: Positive for weakness and light-headedness. Negative for dizziness and syncope.   Psychiatric/Behavioral: Negative.          Objective:      Physical Exam  Vitals and nursing note reviewed.   Constitutional:       Appearance: Normal appearance.   HENT:      Head: Normocephalic.   Eyes:      Pupils: Pupils are equal, round, and reactive to light.   Cardiovascular:      Rate and Rhythm: Normal rate and regular rhythm.      Pulses: Normal pulses.      Heart sounds: Normal heart sounds.   Pulmonary:      Effort: Pulmonary effort is normal.      Breath sounds: Normal breath sounds.   Abdominal:      General: Bowel sounds are normal.      Palpations: Abdomen is soft.   Musculoskeletal:         General: Normal range of motion.   Skin:     General: Skin is warm and dry.   Neurological:      Mental Status: She is alert and oriented to person, place, and time.   Psychiatric:         Behavior: Behavior normal.         Assessment:       Problem List Items " Addressed This Visit    None     Visit Diagnoses     Gastroenteritis    -  Primary    Hypotension, unspecified hypotension type              Plan:        1. Stop antihypertensives, increase fluids and sports drinks, discussed drinking broth and other bland foods. Monitor BP at home restart meds once your BP has returned to normal. Discussed taking Bentyl, call back to clinic if diarrhea is not improving.      Reviewed 2/11/22 by SANGITA Huizar MD

## 2022-02-17 ENCOUNTER — TELEPHONE (OUTPATIENT)
Dept: FAMILY MEDICINE | Facility: CLINIC | Age: 62
End: 2022-02-17
Payer: MEDICARE

## 2022-02-17 NOTE — TELEPHONE ENCOUNTER
Pt contacted. Dr Huizar consulted. Advised pt that she is able to rec booster 2 months after covid pos since she did not rec infusion. Pt verbalized understanding.

## 2022-02-17 NOTE — TELEPHONE ENCOUNTER
----- Message from Amanda Wright sent at 2/17/2022  9:50 AM CST -----  Pt called and would like a nurse to call her back. She has questions about the booster.   Phone number is [phone]

## 2022-05-25 ENCOUNTER — OFFICE VISIT (OUTPATIENT)
Dept: DERMATOLOGY | Facility: CLINIC | Age: 62
End: 2022-05-25
Payer: MEDICAID

## 2022-05-25 VITALS — WEIGHT: 199.94 LBS | BODY MASS INDEX: 31.38 KG/M2 | HEIGHT: 67 IN | RESPIRATION RATE: 16 BRPM

## 2022-05-25 DIAGNOSIS — L08.9 SKIN INFECTION: Primary | ICD-10-CM

## 2022-05-25 PROCEDURE — 87186 CULTURE, WOUND: ICD-10-PCS | Mod: ,,, | Performed by: CLINICAL MEDICAL LABORATORY

## 2022-05-25 PROCEDURE — 87077 CULTURE AEROBIC IDENTIFY: CPT | Mod: ,,, | Performed by: CLINICAL MEDICAL LABORATORY

## 2022-05-25 PROCEDURE — 1159F MED LIST DOCD IN RCRD: CPT | Mod: CPTII,,, | Performed by: DERMATOLOGY

## 2022-05-25 PROCEDURE — 3008F BODY MASS INDEX DOCD: CPT | Mod: CPTII,,, | Performed by: DERMATOLOGY

## 2022-05-25 PROCEDURE — 4010F ACE/ARB THERAPY RXD/TAKEN: CPT | Mod: CPTII,,, | Performed by: DERMATOLOGY

## 2022-05-25 PROCEDURE — 87186 SC STD MICRODIL/AGAR DIL: CPT | Mod: ,,, | Performed by: CLINICAL MEDICAL LABORATORY

## 2022-05-25 PROCEDURE — 3008F PR BODY MASS INDEX (BMI) DOCUMENTED: ICD-10-PCS | Mod: CPTII,,, | Performed by: DERMATOLOGY

## 2022-05-25 PROCEDURE — 1159F PR MEDICATION LIST DOCUMENTED IN MEDICAL RECORD: ICD-10-PCS | Mod: CPTII,,, | Performed by: DERMATOLOGY

## 2022-05-25 PROCEDURE — 99213 OFFICE O/P EST LOW 20 MIN: CPT | Mod: 25,,, | Performed by: DERMATOLOGY

## 2022-05-25 PROCEDURE — 87077 CULTURE, WOUND: ICD-10-PCS | Mod: ,,, | Performed by: CLINICAL MEDICAL LABORATORY

## 2022-05-25 PROCEDURE — 10061 PR DRAIN SKIN ABSCESS COMPLIC: ICD-10-PCS | Mod: ,,, | Performed by: DERMATOLOGY

## 2022-05-25 PROCEDURE — 4010F PR ACE/ARB THEARPY RXD/TAKEN: ICD-10-PCS | Mod: CPTII,,, | Performed by: DERMATOLOGY

## 2022-05-25 PROCEDURE — 10061 I&D ABSCESS COMP/MULTIPLE: CPT | Mod: ,,, | Performed by: DERMATOLOGY

## 2022-05-25 PROCEDURE — 87070 CULTURE, WOUND: ICD-10-PCS | Mod: ,,, | Performed by: CLINICAL MEDICAL LABORATORY

## 2022-05-25 PROCEDURE — 87070 CULTURE OTHR SPECIMN AEROBIC: CPT | Mod: ,,, | Performed by: CLINICAL MEDICAL LABORATORY

## 2022-05-25 PROCEDURE — 99213 PR OFFICE/OUTPT VISIT, EST, LEVL III, 20-29 MIN: ICD-10-PCS | Mod: 25,,, | Performed by: DERMATOLOGY

## 2022-05-25 RX ORDER — DOXYCYCLINE 100 MG/1
CAPSULE ORAL
Qty: 14 CAPSULE | Refills: 0 | Status: SHIPPED | OUTPATIENT
Start: 2022-05-25 | End: 2022-05-31

## 2022-05-25 NOTE — PROGRESS NOTES
[facility]   Tatum Perry MD    Patient Name: Janene Castillo  Patient YOB: 1960   Date of Service: 22    CC: Lesion    HPI: Janene Castillo is a 62 y.o. female here today for lesion, located on the forehead.  Lesion has been present for 1 days.  Previous treatments include none.      Past Medical History:   Diagnosis Date    Allergic contact dermatitis     balsam of peru, bacitracin, fragrance, methyldibromo glutaronite    HTN (hypertension)     Plaque psoriasis     Prurigo nodularis     Psoriatic arthritis      Past Surgical History:   Procedure Laterality Date     SECTION       Review of patient's allergies indicates:   Allergen Reactions    Bacitracin Dermatitis    Codeine sulfate     Codeine Rash     Rash     Methyldibromoglutaronitrile Dermatitis    Permethrin Rash     Rash     Sulfa (sulfonamide antibiotics) Other (See Comments) and Rash     unknown       Current Outpatient Medications:     amLODIPine (NORVASC) 2.5 MG tablet, Take 1 tablet by mouth once daily, Disp: 90 tablet, Rfl: 0    benazepriL (LOTENSIN) 20 MG tablet, Take 1 tablet by mouth once daily, Disp: 90 tablet, Rfl: 0    buPROPion (WELLBUTRIN) 100 MG tablet, Take 1 tablet (100 mg total) by mouth 2 (two) times daily., Disp: 180 tablet, Rfl: 1    calcipotriene-betamethasone (TACLONEX) ointment, APPLY OINTMENT TO RASH ON BODY TWICE DAILY TAPERING WITH IMPROVEMENT., Disp: 60 g, Rfl: 0    doxycycline (VIBRAMYCIN) 100 MG Cap, Take one capsule with food, twice daily. DO NOT TAKE WITH DAIRY. Wear sunscreen while taking medication., Disp: 14 capsule, Rfl: 0    ezetimibe (ZETIA) 10 mg tablet, TAKE 1 TABLET BY MOUTH ONCE DAILY IN THE EVENING, Disp: 90 tablet, Rfl: 0    hydrOXYzine pamoate (VISTARIL) 25 MG Cap, TAKE 1 TO 2 CAPSULES BY MOUTH EVERY 8 HOURS AS NEEDED, Disp: 60 capsule, Rfl: 0    methotrexate, PF, 25 mg/mL Soln, INJECT 0.5 ML ONCE A WEEK ON THE SAME DAY EACH WEEK, Disp: , Rfl:     mupirocin  (BACTROBAN) 2 % ointment, Apply topically 3 (three) times daily., Disp: 1 Tube, Rfl: 0    omeprazole (PRILOSEC) 20 MG capsule, Take 1 capsule by mouth once daily, Disp: 90 capsule, Rfl: 0    oxyCODONE-acetaminophen (PERCOCET)  mg per tablet, Take 1 tablet by mouth every 12 (twelve) hours as needed., Disp: , Rfl:     STELARA 45 mg/0.5 mL Syrg syringe, , Disp: , Rfl:     tiZANidine (ZANAFLEX) 4 MG tablet, Take 1 tablet by mouth twice daily, Disp: 90 tablet, Rfl: 0    valACYclovir (VALTREX) 1000 MG tablet, TAKE 2 TABLETS BY MOUTH TWICE DAILY FOR COLD SYMPTOMS, Disp: 4 tablet, Rfl: 0    varenicline (CHANTIX) 1 mg Tab, Take 1 tablet by mouth twice daily, Disp: 56 tablet, Rfl: 0    ROS: A focused review of systems was obtained and negative.     Exam: A focused skin exam was performed. All areas examined were normal except as mentioned in the assessment and plan below.  General Appearance of the patient is well developed and well nourished.  Orientation: alert and oriented x 3.  Mood and affect: pleasant.    Assessment:   The encounter diagnosis was Skin infection.    Plan:   Medications Ordered This Encounter   Medications    doxycycline (VIBRAMYCIN) 100 MG Cap     Sig: Take one capsule with food, twice daily. DO NOT TAKE WITH DAIRY. Wear sunscreen while taking medication.     Dispense:  14 capsule     Refill:  0     Skin Infection, NOS   - fluctuant nodule on the right lateral forehead    Plan: Counseling  I counseled the patient regarding the following:  Skin care: Patients with purulence or fluid collections should have their wounds re-opened, drained, cultured and irrigated. All wound infections should be treated with antibiotics.  Expectations: Wound Infections usually occur 4-7 days postoperatively. Patients exhibit, pain, rednes, swelling, cellulitic changes and fever.  Contact Office if: Wound Infection fails to respond to treatment or worsens, patient develops a fever, or if redness spreads despite  antibiotics.    A bacterial culture was obtained from the right      -Will treat with doxycycline until receiving  culture results.   Doxycycline Counseling: Patient counseled regarding possible photosensitivity and increased risk for sunburn. Patient instructed to avoid sunlight, if possible. When exposed to sunlight, patients should wear protective clothing, sunglasses, and sunscreen. The patient was instructed to call the office immediately if the following severe adverse effects occur: hearing changes, easy bruising/bleeding, severe headache, or vision changes. The patient verbalized understanding of the proper use and possible adverse effects of doxycycline. All of the patient's questions and concerns were addressed. Patient understands to avoid pregnancy while on therapy due to potential birth defects.    An I&D was performed on the above location. Consent was obtained and risks were reviewed including but not limited to delayed wound healing, infection, need for multiple I and D's, and pain.The area was prepped in the usual clean fashion. Local anesthesia was achieved with 1% lidocaine with epinephrine. The lesion was incised with an 11 blade, and pressure was applied to the wound to drain the underlying contents. After the contents were expressed a curette was used to partially remove the cyst wall. The wound was packed with iodoform packing strips.Petrolatum and a dry sterile dressing were applied and wound care was reviewed.    Follow up if symptoms worsen or fail to improve.    Tatum Perry MD

## 2022-05-27 LAB — MICROORGANISM SPEC CULT: ABNORMAL

## 2022-06-30 ENCOUNTER — OFFICE VISIT (OUTPATIENT)
Dept: FAMILY MEDICINE | Facility: CLINIC | Age: 62
End: 2022-06-30
Payer: MEDICAID

## 2022-06-30 VITALS
OXYGEN SATURATION: 97 % | BODY MASS INDEX: 31.39 KG/M2 | HEIGHT: 67 IN | TEMPERATURE: 98 F | WEIGHT: 200 LBS | DIASTOLIC BLOOD PRESSURE: 98 MMHG | RESPIRATION RATE: 18 BRPM | SYSTOLIC BLOOD PRESSURE: 124 MMHG | HEART RATE: 57 BPM

## 2022-06-30 DIAGNOSIS — I10 HYPERTENSION, UNSPECIFIED TYPE: ICD-10-CM

## 2022-06-30 DIAGNOSIS — R94.31 ABNORMAL EKG: Primary | ICD-10-CM

## 2022-06-30 DIAGNOSIS — R01.1 HEART MURMUR: ICD-10-CM

## 2022-06-30 DIAGNOSIS — E78.5 HYPERLIPIDEMIA, UNSPECIFIED HYPERLIPIDEMIA TYPE: ICD-10-CM

## 2022-06-30 DIAGNOSIS — L40.50 PSORIATIC ARTHRITIS: ICD-10-CM

## 2022-06-30 PROCEDURE — 3074F SYST BP LT 130 MM HG: CPT | Mod: ,,, | Performed by: FAMILY MEDICINE

## 2022-06-30 PROCEDURE — 3008F BODY MASS INDEX DOCD: CPT | Mod: ,,, | Performed by: FAMILY MEDICINE

## 2022-06-30 PROCEDURE — 3080F DIAST BP >= 90 MM HG: CPT | Mod: ,,, | Performed by: FAMILY MEDICINE

## 2022-06-30 PROCEDURE — 1160F RVW MEDS BY RX/DR IN RCRD: CPT | Mod: ,,, | Performed by: FAMILY MEDICINE

## 2022-06-30 PROCEDURE — 4010F PR ACE/ARB THEARPY RXD/TAKEN: ICD-10-PCS | Mod: ,,, | Performed by: FAMILY MEDICINE

## 2022-06-30 PROCEDURE — 3074F PR MOST RECENT SYSTOLIC BLOOD PRESSURE < 130 MM HG: ICD-10-PCS | Mod: ,,, | Performed by: FAMILY MEDICINE

## 2022-06-30 PROCEDURE — 93005 ELECTROCARDIOGRAM TRACING: CPT | Mod: ,,, | Performed by: FAMILY MEDICINE

## 2022-06-30 PROCEDURE — 93005 PR ELECTROCARDIOGRAM, TRACING: ICD-10-PCS | Mod: ,,, | Performed by: FAMILY MEDICINE

## 2022-06-30 PROCEDURE — 1159F MED LIST DOCD IN RCRD: CPT | Mod: ,,, | Performed by: FAMILY MEDICINE

## 2022-06-30 PROCEDURE — 99213 PR OFFICE/OUTPT VISIT, EST, LEVL III, 20-29 MIN: ICD-10-PCS | Mod: ,,, | Performed by: FAMILY MEDICINE

## 2022-06-30 PROCEDURE — 99213 OFFICE O/P EST LOW 20 MIN: CPT | Mod: ,,, | Performed by: FAMILY MEDICINE

## 2022-06-30 PROCEDURE — 3080F PR MOST RECENT DIASTOLIC BLOOD PRESSURE >= 90 MM HG: ICD-10-PCS | Mod: ,,, | Performed by: FAMILY MEDICINE

## 2022-06-30 PROCEDURE — 1159F PR MEDICATION LIST DOCUMENTED IN MEDICAL RECORD: ICD-10-PCS | Mod: ,,, | Performed by: FAMILY MEDICINE

## 2022-06-30 PROCEDURE — 1160F PR REVIEW ALL MEDS BY PRESCRIBER/CLIN PHARMACIST DOCUMENTED: ICD-10-PCS | Mod: ,,, | Performed by: FAMILY MEDICINE

## 2022-06-30 PROCEDURE — 3008F PR BODY MASS INDEX (BMI) DOCUMENTED: ICD-10-PCS | Mod: ,,, | Performed by: FAMILY MEDICINE

## 2022-06-30 PROCEDURE — 4010F ACE/ARB THERAPY RXD/TAKEN: CPT | Mod: ,,, | Performed by: FAMILY MEDICINE

## 2022-06-30 NOTE — PROGRESS NOTES
Janene Castillo is a 62 y.o. female seen today for follow-up on her hyperlipidemia.  Unfortunately patient did have Creamer in a coffee today will come in tomorrow for fasting lab work.  Patient has had statins in the past but did not tolerate them due to severe myalgias.  She also suffers from psoriatic arthritis and is currently attending pain management.  She reports no murmur was noted on last exam.  She has had no chest pain or shortness of breath but does have a strong family history of heart disease.       Past Medical History:   Diagnosis Date    Allergic contact dermatitis     balsam of peru, bacitracin, fragrance, methyldibromo glutaronite    HTN (hypertension)     Plaque psoriasis     Prurigo nodularis     Psoriatic arthritis      Family History   Problem Relation Age of Onset    Melanoma Neg Hx      Current Outpatient Medications on File Prior to Visit   Medication Sig Dispense Refill    amLODIPine (NORVASC) 2.5 MG tablet Take 1 tablet by mouth once daily 90 tablet 0    benazepriL (LOTENSIN) 20 MG tablet Take 1 tablet by mouth once daily 90 tablet 0    buPROPion (WELLBUTRIN) 100 MG tablet Take 1 tablet (100 mg total) by mouth 2 (two) times daily. 180 tablet 1    calcipotriene-betamethasone (TACLONEX) ointment APPLY OINTMENT TO RASH ON BODY TWICE DAILY TAPERING WITH IMPROVEMENT. 60 g 0    ezetimibe (ZETIA) 10 mg tablet TAKE 1 TABLET BY MOUTH ONCE DAILY IN THE EVENING 90 tablet 0    hydrOXYzine pamoate (VISTARIL) 25 MG Cap TAKE 1 TO 2 CAPSULES BY MOUTH EVERY 8 HOURS AS NEEDED 60 capsule 0    methotrexate, PF, 25 mg/mL Soln INJECT 0.5 ML ONCE A WEEK ON THE SAME DAY EACH WEEK      mupirocin (BACTROBAN) 2 % ointment Apply topically 3 (three) times daily. 1 Tube 0    omeprazole (PRILOSEC) 20 MG capsule Take 1 capsule by mouth once daily 90 capsule 0    oxyCODONE-acetaminophen (PERCOCET)  mg per tablet Take 1 tablet by mouth every 12 (twelve) hours as needed.      STELARA 45 mg/0.5 mL Syrg  syringe       tiZANidine (ZANAFLEX) 4 MG tablet Take 1 tablet by mouth twice daily 90 tablet 0    valACYclovir (VALTREX) 1000 MG tablet TAKE 2 TABLETS BY MOUTH TWICE DAILY FOR COLD SYMPTOMS 4 tablet 0    varenicline (CHANTIX) 1 mg Tab Take 1 tablet by mouth twice daily 56 tablet 0    doxycycline (VIBRAMYCIN) 100 MG Cap TAKE 1 CAPSULE BY MOUTH TWICE DAILY WITH  FOOD--  DO  NOT  TAKE  WITH  DAIRY  --  WEAR  SUNSCREEN  WHILE  TAKING (Patient not taking: Reported on 6/30/2022) 14 capsule 0     No current facility-administered medications on file prior to visit.       There is no immunization history on file for this patient.    Review of Systems   Constitutional: Negative for fever, malaise/fatigue and weight loss.   Respiratory: Negative for shortness of breath.    Cardiovascular: Negative for chest pain and palpitations.   Gastrointestinal: Negative for nausea and vomiting.   Musculoskeletal: Positive for joint pain and myalgias.   Psychiatric/Behavioral: Negative for depression.        Vitals:    06/30/22 1018   BP: (!) 124/98   Pulse: (!) 57   Resp: 18   Temp: 98.2 °F (36.8 °C)       Physical Exam  Vitals reviewed.   Constitutional:       Appearance: Normal appearance.   HENT:      Head: Normocephalic.   Eyes:      Extraocular Movements: Extraocular movements intact.      Conjunctiva/sclera: Conjunctivae normal.      Pupils: Pupils are equal, round, and reactive to light.   Neck:      Thyroid: No thyroid mass or thyromegaly.   Cardiovascular:      Rate and Rhythm: Normal rate and regular rhythm.      Heart sounds: Murmur heard.    Systolic murmur is present with a grade of 2/6.    No gallop.      Comments: Patient has a soft murmur heard loudest at the right sternal border.  Pulmonary:      Effort: Pulmonary effort is normal. No respiratory distress.      Breath sounds: Normal breath sounds. No wheezing or rales.   Skin:     General: Skin is warm and dry.      Coloration: Skin is not jaundiced or pale.    Neurological:      Mental Status: She is alert.   Psychiatric:         Mood and Affect: Mood normal.         Behavior: Behavior normal.         Thought Content: Thought content normal.         Judgment: Judgment normal.          Assessment and Plan  Abnormal EKG  -     POCT EKG 12-LEAD (NOT FOR [employer] USE)  -     Ambulatory referral/consult to Cardiology; Future; Expected date: 07/07/2022    Hyperlipidemia, unspecified hyperlipidemia type  -     Lipid Panel; Future; Expected date: 07/01/2022    Hypertension, unspecified type  -     CBC Auto Differential; Future; Expected date: 07/01/2022  -     Comprehensive Metabolic Panel; Future; Expected date: 07/01/2022    Psoriatic arthritis  -     Ambulatory referral/consult to Pain Clinic; Future; Expected date: 07/07/2022    Heart murmur            Return to clinic in  1 month for follow-up or as needed.    Health Maintenance Topics with due status: Not Due       Topic Last Completion Date    Cervical Cancer Screening 02/25/2020    Colorectal Cancer Screening 12/22/2020    Lipid Panel 10/25/2021    Influenza Vaccine Not Due

## 2022-07-06 ENCOUNTER — TELEPHONE (OUTPATIENT)
Dept: FAMILY MEDICINE | Facility: CLINIC | Age: 62
End: 2022-07-06
Payer: MEDICAID

## 2022-07-06 NOTE — TELEPHONE ENCOUNTER
----- Message from Asael Huizar MD sent at 7/1/2022  2:38 PM CDT -----  Office visit for renal function and LDL.

## 2022-07-13 ENCOUNTER — OFFICE VISIT (OUTPATIENT)
Dept: DERMATOLOGY | Facility: CLINIC | Age: 62
End: 2022-07-13
Payer: MEDICAID

## 2022-07-13 VITALS — BODY MASS INDEX: 31.39 KG/M2 | RESPIRATION RATE: 18 BRPM | HEIGHT: 67 IN | WEIGHT: 200 LBS

## 2022-07-13 DIAGNOSIS — L23.89 ALLERGIC CONTACT DERMATITIS DUE TO OTHER AGENTS: ICD-10-CM

## 2022-07-13 DIAGNOSIS — L30.9 DERMATITIS, UNSPECIFIED: ICD-10-CM

## 2022-07-13 DIAGNOSIS — L40.50 PSORIATIC ARTHRITIS: Primary | ICD-10-CM

## 2022-07-13 DIAGNOSIS — L08.9 SKIN INFECTION: ICD-10-CM

## 2022-07-13 PROCEDURE — 3008F BODY MASS INDEX DOCD: CPT | Mod: CPTII,,, | Performed by: DERMATOLOGY

## 2022-07-13 PROCEDURE — 3008F PR BODY MASS INDEX (BMI) DOCUMENTED: ICD-10-PCS | Mod: CPTII,,, | Performed by: DERMATOLOGY

## 2022-07-13 PROCEDURE — 87077 CULTURE, WOUND: ICD-10-PCS | Mod: ,,, | Performed by: CLINICAL MEDICAL LABORATORY

## 2022-07-13 PROCEDURE — 87186 SC STD MICRODIL/AGAR DIL: CPT | Mod: ,,, | Performed by: CLINICAL MEDICAL LABORATORY

## 2022-07-13 PROCEDURE — 1159F MED LIST DOCD IN RCRD: CPT | Mod: CPTII,,, | Performed by: DERMATOLOGY

## 2022-07-13 PROCEDURE — 87077 CULTURE AEROBIC IDENTIFY: CPT | Mod: ,,, | Performed by: CLINICAL MEDICAL LABORATORY

## 2022-07-13 PROCEDURE — 87070 CULTURE, WOUND: ICD-10-PCS | Mod: ,,, | Performed by: CLINICAL MEDICAL LABORATORY

## 2022-07-13 PROCEDURE — 99214 PR OFFICE/OUTPT VISIT, EST, LEVL IV, 30-39 MIN: ICD-10-PCS | Mod: ,,, | Performed by: DERMATOLOGY

## 2022-07-13 PROCEDURE — 99214 OFFICE O/P EST MOD 30 MIN: CPT | Mod: ,,, | Performed by: DERMATOLOGY

## 2022-07-13 PROCEDURE — 87070 CULTURE OTHR SPECIMN AEROBIC: CPT | Mod: ,,, | Performed by: CLINICAL MEDICAL LABORATORY

## 2022-07-13 PROCEDURE — 87186 CULTURE, WOUND: ICD-10-PCS | Mod: ,,, | Performed by: CLINICAL MEDICAL LABORATORY

## 2022-07-13 PROCEDURE — 4010F ACE/ARB THERAPY RXD/TAKEN: CPT | Mod: CPTII,,, | Performed by: DERMATOLOGY

## 2022-07-13 PROCEDURE — 1159F PR MEDICATION LIST DOCUMENTED IN MEDICAL RECORD: ICD-10-PCS | Mod: CPTII,,, | Performed by: DERMATOLOGY

## 2022-07-13 PROCEDURE — 4010F PR ACE/ARB THEARPY RXD/TAKEN: ICD-10-PCS | Mod: CPTII,,, | Performed by: DERMATOLOGY

## 2022-07-13 RX ORDER — CALCIPOTRIENE, BETAMETHASONE DIPROPIONATE 50; .643 UG/G; MG/G
OINTMENT TOPICAL
Qty: 60 G | Refills: 2 | Status: SHIPPED | OUTPATIENT
Start: 2022-07-13 | End: 2022-11-29

## 2022-07-13 RX ORDER — MUPIROCIN 20 MG/G
OINTMENT TOPICAL 3 TIMES DAILY
Qty: 1 EACH | Refills: 0 | Status: SHIPPED | OUTPATIENT
Start: 2022-07-13 | End: 2022-08-12

## 2022-07-13 NOTE — PROGRESS NOTES
[facility]   Tatum Perry MD    Patient Name: Janene Castillo  Patient YOB: 1960   Date of Service: 22    CC: Follow-up Psoriasis vs ACD    HPI: Janene Castillo is a 62 y.o. female here today for follow-up of psorasis vs ACD likely with a component of prurigo nodularis, last seen 2021.  Current treatment includes stelara. Patient has failed topicals, MTX, Humira, Enbrel, and Consentyx.  Overall, the rash is Inadequately controlled.  Pt had patch testing in the past with Dr. Novoa with several positive allergens.  She is following her CAMP list. Patient was started on Stelara with  who manages her arthritis but pt thinks this is no longer working. His plan is to change to tremfya.    Past Medical History:   Diagnosis Date    Allergic contact dermatitis     balsam of peru, bacitracin, fragrance, methyldibromo glutaronite    HTN (hypertension)     Plaque psoriasis     Prurigo nodularis     Psoriatic arthritis      Past Surgical History:   Procedure Laterality Date     SECTION       Review of patient's allergies indicates:   Allergen Reactions    Bacitracin Dermatitis    Codeine sulfate     Codeine Rash     Rash     Methyldibromoglutaronitrile Dermatitis    Permethrin Rash     Rash     Sulfa (sulfonamide antibiotics) Other (See Comments) and Rash     unknown       Current Outpatient Medications:     amLODIPine (NORVASC) 2.5 MG tablet, Take 1 tablet by mouth once daily, Disp: 90 tablet, Rfl: 0    benazepriL (LOTENSIN) 20 MG tablet, Take 1 tablet by mouth once daily, Disp: 90 tablet, Rfl: 0    buPROPion (WELLBUTRIN) 100 MG tablet, Take 1 tablet (100 mg total) by mouth 2 (two) times daily., Disp: 180 tablet, Rfl: 1    calcipotriene-betamethasone (TACLONEX) ointment, Apply to AA on body BID, tapering with improvement, Disp: 60 g, Rfl: 2    ezetimibe (ZETIA) 10 mg tablet, TAKE 1 TABLET BY MOUTH ONCE DAILY IN THE EVENING, Disp: 90 tablet, Rfl: 0    hydrOXYzine  pamoate (VISTARIL) 25 MG Cap, TAKE 1 TO 2 CAPSULES BY MOUTH EVERY 8 HOURS AS NEEDED, Disp: 60 capsule, Rfl: 0    methotrexate, PF, 25 mg/mL Soln, INJECT 0.5 ML ONCE A WEEK ON THE SAME DAY EACH WEEK, Disp: , Rfl:     mupirocin (BACTROBAN) 2 % ointment, Apply topically 3 (three) times daily., Disp: 1 each, Rfl: 0    omeprazole (PRILOSEC) 20 MG capsule, Take 1 capsule by mouth once daily, Disp: 90 capsule, Rfl: 0    oxyCODONE-acetaminophen (PERCOCET)  mg per tablet, Take 1 tablet by mouth every 12 (twelve) hours as needed., Disp: , Rfl:     STELARA 45 mg/0.5 mL Syrg syringe, , Disp: , Rfl:     tiZANidine (ZANAFLEX) 4 MG tablet, Take 1 tablet by mouth twice daily, Disp: 90 tablet, Rfl: 0    valACYclovir (VALTREX) 1000 MG tablet, TAKE 2 TABLETS BY MOUTH TWICE DAILY FOR COLD SYMPTOMS, Disp: 4 tablet, Rfl: 0    varenicline (CHANTIX) 1 mg Tab, Take 1 tablet by mouth twice daily, Disp: 56 tablet, Rfl: 0    ROS: A focused review of systems was obtained and negative.     Exam: A focused skin exam was performed. All areas examined were normal except as mentioned in the assessment and plan below.  General Appearance of the patient is well developed and well nourished.  Orientation: alert and oriented x 3.  Mood and affect: pleasant.    Assessment:   The primary encounter diagnosis was Psoriatic arthritis. Diagnoses of Allergic contact dermatitis due to other agents, Dermatitis, unspecified, and Skin infection were also pertinent to this visit.    Plan:   Medications Ordered This Encounter   Medications    calcipotriene-betamethasone (TACLONEX) ointment     Sig: Apply to AA on body BID, tapering with improvement     Dispense:  60 g     Refill:  2    mupirocin (BACTROBAN) 2 % ointment     Sig: Apply topically 3 (three) times daily.     Dispense:  1 each     Refill:  0     Psoriatic arthritis  - lichenified plaques and eroded nodules  Status: Unchanged     - Continue Sterlara injections with  for now but  agree with the switch to tremfya.    - once quant gold is negative, we can give her tremfya samples  - Still concerned that there is a large component of LSC/prurigo nodularis but discussions with Ms. Castillo regarding this have not been successful     Allergic Contact Dermatitis (L23.89)  - lichenified plaques and eroded nodules  Status: Unchanged     Plan: Counseling.  I counseled the patient regarding the following:  Skin care: Patient should use hypoallergenic products such as unscented soaps. Eliminate exposure to all new  cosmetics, fragrances, hair products, nail products shampoos, scented soaps, plants, metals and sunscreens.  Expectations: Allergic contact dermatitis can persist for several weeks before it fully resolves. Sometimes, patch  testing is necessary if reactions persist or if the patient is in contact with several potential allergens.  Contact office if: Allergic contact dermatitis worsens or fails to improve despite several weeks of treatment.    - continue to avoid allergens   - Will refill Taclonex  - Patient will plan to start Tremfya with Dr. Eugene     Skin Infection, NOS   - fissures and crusting of lesions    Plan: Counseling  I counseled the patient regarding the following:  Skin care: Patients with purulence or fluid collections should have their wounds re-opened, drained, cultured and irrigated. All wound infections should be treated with antibiotics.  Expectations: Wound Infections usually occur 4-7 days postoperatively. Patients exhibit, pain, rednes, swelling, cellulitic changes and fever.  Contact Office if: Wound Infection fails to respond to treatment or worsens, patient develops a fever, or if redness spreads despite antibiotics.    A bacterial culture was obtained from the right leg   - Will refill Mupirocin     High Risk Medication Monitoring (Z79.899) : The risks and benefits of the medication were reviewed in full with the patient. Should any side effects occur, the patient will  stop the medication and contact me immediately.  - Patient will have liudmila gold faxed over for tremfya      Follow up in about 3 months (around 10/13/2022).    Tatum Perry MD

## 2022-07-15 DIAGNOSIS — L08.9 SKIN INFECTION: Primary | ICD-10-CM

## 2022-07-15 LAB — MICROORGANISM SPEC CULT: ABNORMAL

## 2022-07-15 RX ORDER — CEPHALEXIN 500 MG/1
500 CAPSULE ORAL 3 TIMES DAILY
Qty: 42 CAPSULE | Refills: 0 | Status: SHIPPED | OUTPATIENT
Start: 2022-07-15 | End: 2022-07-29

## 2022-07-29 ENCOUNTER — OFFICE VISIT (OUTPATIENT)
Dept: FAMILY MEDICINE | Facility: CLINIC | Age: 62
End: 2022-07-29
Payer: MEDICAID

## 2022-07-29 VITALS
HEART RATE: 70 BPM | WEIGHT: 200 LBS | DIASTOLIC BLOOD PRESSURE: 78 MMHG | RESPIRATION RATE: 18 BRPM | OXYGEN SATURATION: 98 % | SYSTOLIC BLOOD PRESSURE: 118 MMHG | BODY MASS INDEX: 31.39 KG/M2 | HEIGHT: 67 IN

## 2022-07-29 DIAGNOSIS — E78.5 HYPERLIPIDEMIA, UNSPECIFIED HYPERLIPIDEMIA TYPE: ICD-10-CM

## 2022-07-29 DIAGNOSIS — Z12.31 ENCOUNTER FOR SCREENING MAMMOGRAM FOR MALIGNANT NEOPLASM OF BREAST: Primary | ICD-10-CM

## 2022-07-29 DIAGNOSIS — I10 HYPERTENSION, UNSPECIFIED TYPE: ICD-10-CM

## 2022-07-29 PROCEDURE — 3078F PR MOST RECENT DIASTOLIC BLOOD PRESSURE < 80 MM HG: ICD-10-PCS | Mod: ,,, | Performed by: FAMILY MEDICINE

## 2022-07-29 PROCEDURE — 4010F ACE/ARB THERAPY RXD/TAKEN: CPT | Mod: ,,, | Performed by: FAMILY MEDICINE

## 2022-07-29 PROCEDURE — 3008F BODY MASS INDEX DOCD: CPT | Mod: ,,, | Performed by: FAMILY MEDICINE

## 2022-07-29 PROCEDURE — 3008F PR BODY MASS INDEX (BMI) DOCUMENTED: ICD-10-PCS | Mod: ,,, | Performed by: FAMILY MEDICINE

## 2022-07-29 PROCEDURE — 3078F DIAST BP <80 MM HG: CPT | Mod: ,,, | Performed by: FAMILY MEDICINE

## 2022-07-29 PROCEDURE — 3074F SYST BP LT 130 MM HG: CPT | Mod: ,,, | Performed by: FAMILY MEDICINE

## 2022-07-29 PROCEDURE — 1159F MED LIST DOCD IN RCRD: CPT | Mod: ,,, | Performed by: FAMILY MEDICINE

## 2022-07-29 PROCEDURE — 1160F RVW MEDS BY RX/DR IN RCRD: CPT | Mod: ,,, | Performed by: FAMILY MEDICINE

## 2022-07-29 PROCEDURE — 1160F PR REVIEW ALL MEDS BY PRESCRIBER/CLIN PHARMACIST DOCUMENTED: ICD-10-PCS | Mod: ,,, | Performed by: FAMILY MEDICINE

## 2022-07-29 PROCEDURE — 99212 PR OFFICE/OUTPT VISIT, EST, LEVL II, 10-19 MIN: ICD-10-PCS | Mod: ,,, | Performed by: FAMILY MEDICINE

## 2022-07-29 PROCEDURE — 99212 OFFICE O/P EST SF 10 MIN: CPT | Mod: ,,, | Performed by: FAMILY MEDICINE

## 2022-07-29 PROCEDURE — 4010F PR ACE/ARB THEARPY RXD/TAKEN: ICD-10-PCS | Mod: ,,, | Performed by: FAMILY MEDICINE

## 2022-07-29 PROCEDURE — 1159F PR MEDICATION LIST DOCUMENTED IN MEDICAL RECORD: ICD-10-PCS | Mod: ,,, | Performed by: FAMILY MEDICINE

## 2022-07-29 PROCEDURE — 3074F PR MOST RECENT SYSTOLIC BLOOD PRESSURE < 130 MM HG: ICD-10-PCS | Mod: ,,, | Performed by: FAMILY MEDICINE

## 2022-07-29 RX ORDER — RISANKIZUMAB-RZAA 150 MG/ML
INJECTION SUBCUTANEOUS
Status: ON HOLD | COMMUNITY
Start: 2022-07-19 | End: 2022-10-02 | Stop reason: HOSPADM

## 2022-07-29 NOTE — PROGRESS NOTES
Janene Castillo is a 62 y.o. female seen today for follow-up for her hypertension and hyperlipidemia.  On her recent lab work her lipids are markedly improved with an LDL of 82 and creatinine is improved to 1.27.  I encouraged her to continue her diet and Zetia and increase her fluid intake.      Past Medical History:   Diagnosis Date    Allergic contact dermatitis     balsam of peru, bacitracin, fragrance, methyldibromo glutaronite    HTN (hypertension)     Plaque psoriasis     Prurigo nodularis     Psoriatic arthritis      Family History   Problem Relation Age of Onset    Melanoma Neg Hx      Current Outpatient Medications on File Prior to Visit   Medication Sig Dispense Refill    amLODIPine (NORVASC) 2.5 MG tablet Take 1 tablet by mouth once daily 90 tablet 0    benazepriL (LOTENSIN) 20 MG tablet Take 1 tablet by mouth once daily 90 tablet 0    buPROPion (WELLBUTRIN) 100 MG tablet Take 1 tablet (100 mg total) by mouth 2 (two) times daily. 180 tablet 1    calcipotriene-betamethasone (TACLONEX) ointment Apply to AA on body BID, tapering with improvement 60 g 2    cephALEXin (KEFLEX) 500 MG capsule Take 1 capsule (500 mg total) by mouth 3 (three) times daily. for 14 days 42 capsule 0    ezetimibe (ZETIA) 10 mg tablet TAKE 1 TABLET BY MOUTH ONCE DAILY IN THE EVENING 90 tablet 0    hydrOXYzine pamoate (VISTARIL) 25 MG Cap TAKE 1 TO 2 CAPSULES BY MOUTH EVERY 8 HOURS AS NEEDED 60 capsule 0    mupirocin (BACTROBAN) 2 % ointment Apply topically 3 (three) times daily. 1 each 0    oxyCODONE-acetaminophen (PERCOCET)  mg per tablet Take 1 tablet by mouth every 12 (twelve) hours as needed.      risankizumab-rzaa (SKYRIZI) 150 mg/mL PnIj inject 150mg/ml at week 0, 4 and then every 12 weeks thereafter      tiZANidine (ZANAFLEX) 4 MG tablet Take 1 tablet by mouth twice daily 90 tablet 0    valACYclovir (VALTREX) 1000 MG tablet TAKE 2 TABLETS BY MOUTH TWICE DAILY FOR COLD SYMPTOMS 4 tablet 0    varenicline  (CHANTIX) 1 mg Tab Take 1 tablet by mouth twice daily 56 tablet 0    methotrexate, PF, 25 mg/mL Soln INJECT 0.5 ML ONCE A WEEK ON THE SAME DAY EACH WEEK      omeprazole (PRILOSEC) 20 MG capsule Take 1 capsule by mouth once daily (Patient not taking: Reported on 7/29/2022) 90 capsule 0    STELARA 45 mg/0.5 mL Syrg syringe        No current facility-administered medications on file prior to visit.       There is no immunization history on file for this patient.    Review of Systems   Constitutional: Negative for fever, malaise/fatigue and weight loss.   Respiratory: Negative for shortness of breath.    Cardiovascular: Negative for chest pain and palpitations.   Gastrointestinal: Negative for nausea and vomiting.   Psychiatric/Behavioral: Negative for depression.        Vitals:    07/29/22 0853   BP: 118/78   Pulse: 70   Resp: 18       Physical Exam  Vitals reviewed.   Eyes:      Conjunctiva/sclera: Conjunctivae normal.   Pulmonary:      Effort: Pulmonary effort is normal.   Neurological:      Mental Status: She is alert.   Psychiatric:         Mood and Affect: Mood normal.         Behavior: Behavior normal.         Thought Content: Thought content normal.         Judgment: Judgment normal.          Assessment and Plan  Encounter for screening mammogram for malignant neoplasm of breast  -     Mammo Digital Screening Bilat; Future; Expected date: 07/29/2022    Hyperlipidemia, unspecified hyperlipidemia type    Hypertension, unspecified type            Return to clinic in 6 months or as needed    Health Maintenance Topics with due status: Not Due       Topic Last Completion Date    Cervical Cancer Screening 02/25/2020    Colorectal Cancer Screening 12/22/2020    Lipid Panel 07/01/2022    Influenza Vaccine Not Due

## 2022-08-10 ENCOUNTER — OFFICE VISIT (OUTPATIENT)
Dept: FAMILY MEDICINE | Facility: CLINIC | Age: 62
End: 2022-08-10
Payer: MEDICARE

## 2022-08-10 DIAGNOSIS — D84.821 IMMUNOSUPPRESSION DUE TO DRUG THERAPY: ICD-10-CM

## 2022-08-10 DIAGNOSIS — Z79.899 IMMUNOSUPPRESSION DUE TO DRUG THERAPY: ICD-10-CM

## 2022-08-10 DIAGNOSIS — U07.1 COVID-19 VIRUS INFECTION: Primary | ICD-10-CM

## 2022-08-10 PROCEDURE — 1160F PR REVIEW ALL MEDS BY PRESCRIBER/CLIN PHARMACIST DOCUMENTED: ICD-10-PCS | Mod: 95,,, | Performed by: NURSE PRACTITIONER

## 2022-08-10 PROCEDURE — 4010F ACE/ARB THERAPY RXD/TAKEN: CPT | Mod: 95,,, | Performed by: NURSE PRACTITIONER

## 2022-08-10 PROCEDURE — 1159F MED LIST DOCD IN RCRD: CPT | Mod: 95,,, | Performed by: NURSE PRACTITIONER

## 2022-08-10 PROCEDURE — 1159F PR MEDICATION LIST DOCUMENTED IN MEDICAL RECORD: ICD-10-PCS | Mod: 95,,, | Performed by: NURSE PRACTITIONER

## 2022-08-10 PROCEDURE — 1160F RVW MEDS BY RX/DR IN RCRD: CPT | Mod: 95,,, | Performed by: NURSE PRACTITIONER

## 2022-08-10 PROCEDURE — 99212 PR OFFICE/OUTPT VISIT, EST, LEVL II, 10-19 MIN: ICD-10-PCS | Mod: 95,,, | Performed by: NURSE PRACTITIONER

## 2022-08-10 PROCEDURE — 99212 OFFICE O/P EST SF 10 MIN: CPT | Mod: 95,,, | Performed by: NURSE PRACTITIONER

## 2022-08-10 PROCEDURE — 4010F PR ACE/ARB THEARPY RXD/TAKEN: ICD-10-PCS | Mod: 95,,, | Performed by: NURSE PRACTITIONER

## 2022-08-10 RX ORDER — PROMETHAZINE HYDROCHLORIDE AND DEXTROMETHORPHAN HYDROBROMIDE 6.25; 15 MG/5ML; MG/5ML
5 SYRUP ORAL EVERY 6 HOURS PRN
Qty: 118 ML | Refills: 0 | Status: SHIPPED | OUTPATIENT
Start: 2022-08-10 | End: 2022-08-20

## 2022-08-10 RX ORDER — FLUTICASONE PROPIONATE 50 MCG
1 SPRAY, SUSPENSION (ML) NASAL DAILY
Qty: 16 G | Refills: 1 | Status: SHIPPED | OUTPATIENT
Start: 2022-08-10 | End: 2022-09-08

## 2022-08-10 RX ORDER — AZITHROMYCIN 250 MG/1
TABLET, FILM COATED ORAL
Qty: 6 TABLET | Refills: 0 | Status: SHIPPED | OUTPATIENT
Start: 2022-08-10 | End: 2022-08-15

## 2022-08-10 NOTE — PROGRESS NOTES
Subjective:       Patient ID: Janene Castillo is a 62 y.o. female.    Chief Complaint: No chief complaint on file.    Ms. Castillo presents for audio and video telehealth after a positive covid-19 test at home, she reports symptoms started yesterday. She notes she took her skyrizi injection and unfortunately was in contact with someone positive after that. Symptoms as below. She reports fever, chills, headache, nonproductive cough, sore throat, rhinorrhea, nasal congestion, low back pain with a h/o bronchitis and pneumonia.    Cough  This is a new problem. The current episode started yesterday. The problem has been rapidly worsening. The problem occurs hourly. The cough is productive of sputum. Associated symptoms include chest pain, chills, ear congestion, a fever, headaches, heartburn, nasal congestion, postnasal drip, a rash, rhinorrhea and a sore throat. Pertinent negatives include no ear pain, hemoptysis, myalgias, shortness of breath, sweats, weight loss or wheezing. Nothing aggravates the symptoms. She has tried body position changes and rest for the symptoms. The treatment provided no relief. Her past medical history is significant for bronchitis, environmental allergies and pneumonia.     Review of Systems   Constitutional: Positive for chills and fever. Negative for weight loss.   HENT: Positive for postnasal drip, rhinorrhea and sore throat. Negative for ear pain.    Respiratory: Positive for cough. Negative for hemoptysis, shortness of breath and wheezing.    Cardiovascular: Positive for chest pain.   Gastrointestinal: Positive for heartburn.   Musculoskeletal: Negative for myalgias.   Integumentary:  Positive for rash.   Allergic/Immunologic: Positive for environmental allergies.   Neurological: Positive for headaches.         Objective:      Physical Exam  Vitals and nursing note reviewed.   Constitutional:       General: She is not in acute distress.     Appearance: Normal appearance. She is ill-appearing.    HENT:      Head: Normocephalic.   Pulmonary:      Effort: Pulmonary effort is normal. No respiratory distress.   Neurological:      Mental Status: She is alert and oriented to person, place, and time.   Psychiatric:         Behavior: Behavior normal.         Assessment:       Problem List Items Addressed This Visit    None     Visit Diagnoses     COVID-19 virus infection    -  Primary    Immunosuppression due to drug therapy              Plan:         Continue zinc and Vit C, quarantine 5 days, rest, increase fluids, meds as prescribed, symptomatic treatment of headache and fever. Call back to clinic if symptoms worsen or persist.

## 2022-08-22 ENCOUNTER — HOSPITAL ENCOUNTER (OUTPATIENT)
Dept: RADIOLOGY | Facility: HOSPITAL | Age: 62
Discharge: HOME | End: 2022-08-22
Attending: FAMILY MEDICINE
Payer: MEDICARE

## 2022-08-22 ENCOUNTER — TELEPHONE (OUTPATIENT)
Dept: FAMILY MEDICINE | Facility: CLINIC | Age: 62
End: 2022-08-22
Payer: MEDICARE

## 2022-08-22 DIAGNOSIS — Z12.31 ENCOUNTER FOR SCREENING MAMMOGRAM FOR MALIGNANT NEOPLASM OF BREAST: ICD-10-CM

## 2022-08-22 PROCEDURE — 77067 SCR MAMMO BI INCL CAD: CPT | Mod: TC

## 2022-08-23 ENCOUNTER — HOSPITAL ENCOUNTER (OUTPATIENT)
Dept: RADIOLOGY | Facility: HOSPITAL | Age: 62
Discharge: HOME | End: 2022-08-23
Attending: INTERNAL MEDICINE
Payer: MEDICARE

## 2022-08-23 ENCOUNTER — OFFICE VISIT (OUTPATIENT)
Dept: CARDIOLOGY | Facility: CLINIC | Age: 62
End: 2022-08-23
Payer: MEDICARE

## 2022-08-23 VITALS
DIASTOLIC BLOOD PRESSURE: 88 MMHG | SYSTOLIC BLOOD PRESSURE: 136 MMHG | WEIGHT: 197 LBS | HEART RATE: 76 BPM | RESPIRATION RATE: 16 BRPM | HEIGHT: 67 IN | BODY MASS INDEX: 30.92 KG/M2

## 2022-08-23 DIAGNOSIS — I35.8 AORTIC HEART MURMUR: ICD-10-CM

## 2022-08-23 DIAGNOSIS — R07.89 CHEST DISCOMFORT: ICD-10-CM

## 2022-08-23 DIAGNOSIS — Z82.49 FAMILY HISTORY OF PREMATURE CORONARY ARTERY DISEASE: Chronic | ICD-10-CM

## 2022-08-23 DIAGNOSIS — R94.31 ABNORMAL EKG: ICD-10-CM

## 2022-08-23 DIAGNOSIS — R07.89 CHEST DISCOMFORT: Primary | ICD-10-CM

## 2022-08-23 DIAGNOSIS — N18.32 STAGE 3B CHRONIC KIDNEY DISEASE: Chronic | ICD-10-CM

## 2022-08-23 DIAGNOSIS — E78.5 HYPERLIPIDEMIA, UNSPECIFIED HYPERLIPIDEMIA TYPE: Chronic | ICD-10-CM

## 2022-08-23 DIAGNOSIS — R06.02 SOB (SHORTNESS OF BREATH): ICD-10-CM

## 2022-08-23 DIAGNOSIS — I10 HYPERTENSION, UNSPECIFIED TYPE: Chronic | ICD-10-CM

## 2022-08-23 PROCEDURE — 1160F RVW MEDS BY RX/DR IN RCRD: CPT | Mod: CPTII,,, | Performed by: INTERNAL MEDICINE

## 2022-08-23 PROCEDURE — 99203 PR OFFICE/OUTPT VISIT, NEW, LEVL III, 30-44 MIN: ICD-10-PCS | Mod: S$PBB,,, | Performed by: INTERNAL MEDICINE

## 2022-08-23 PROCEDURE — 4010F ACE/ARB THERAPY RXD/TAKEN: CPT | Mod: CPTII,,, | Performed by: INTERNAL MEDICINE

## 2022-08-23 PROCEDURE — 3079F PR MOST RECENT DIASTOLIC BLOOD PRESSURE 80-89 MM HG: ICD-10-PCS | Mod: CPTII,,, | Performed by: INTERNAL MEDICINE

## 2022-08-23 PROCEDURE — 3008F PR BODY MASS INDEX (BMI) DOCUMENTED: ICD-10-PCS | Mod: CPTII,,, | Performed by: INTERNAL MEDICINE

## 2022-08-23 PROCEDURE — 1159F PR MEDICATION LIST DOCUMENTED IN MEDICAL RECORD: ICD-10-PCS | Mod: CPTII,,, | Performed by: INTERNAL MEDICINE

## 2022-08-23 PROCEDURE — 3075F SYST BP GE 130 - 139MM HG: CPT | Mod: CPTII,,, | Performed by: INTERNAL MEDICINE

## 2022-08-23 PROCEDURE — 71046 XR CHEST PA AND LATERAL: ICD-10-PCS | Mod: 26,,, | Performed by: STUDENT IN AN ORGANIZED HEALTH CARE EDUCATION/TRAINING PROGRAM

## 2022-08-23 PROCEDURE — 99203 OFFICE O/P NEW LOW 30 MIN: CPT | Mod: S$PBB,,, | Performed by: INTERNAL MEDICINE

## 2022-08-23 PROCEDURE — 71046 X-RAY EXAM CHEST 2 VIEWS: CPT | Mod: 26,,, | Performed by: STUDENT IN AN ORGANIZED HEALTH CARE EDUCATION/TRAINING PROGRAM

## 2022-08-23 PROCEDURE — 1159F MED LIST DOCD IN RCRD: CPT | Mod: CPTII,,, | Performed by: INTERNAL MEDICINE

## 2022-08-23 PROCEDURE — 71046 X-RAY EXAM CHEST 2 VIEWS: CPT | Mod: TC

## 2022-08-23 PROCEDURE — 4010F PR ACE/ARB THEARPY RXD/TAKEN: ICD-10-PCS | Mod: CPTII,,, | Performed by: INTERNAL MEDICINE

## 2022-08-23 PROCEDURE — 1160F PR REVIEW ALL MEDS BY PRESCRIBER/CLIN PHARMACIST DOCUMENTED: ICD-10-PCS | Mod: CPTII,,, | Performed by: INTERNAL MEDICINE

## 2022-08-23 PROCEDURE — 3079F DIAST BP 80-89 MM HG: CPT | Mod: CPTII,,, | Performed by: INTERNAL MEDICINE

## 2022-08-23 PROCEDURE — 3075F PR MOST RECENT SYSTOLIC BLOOD PRESS GE 130-139MM HG: ICD-10-PCS | Mod: CPTII,,, | Performed by: INTERNAL MEDICINE

## 2022-08-23 PROCEDURE — 3008F BODY MASS INDEX DOCD: CPT | Mod: CPTII,,, | Performed by: INTERNAL MEDICINE

## 2022-08-23 PROCEDURE — 99215 OFFICE O/P EST HI 40 MIN: CPT | Mod: PBBFAC,25 | Performed by: INTERNAL MEDICINE

## 2022-08-26 ENCOUNTER — OFFICE VISIT (OUTPATIENT)
Dept: FAMILY MEDICINE | Facility: CLINIC | Age: 62
End: 2022-08-26
Payer: MEDICARE

## 2022-08-26 VITALS
WEIGHT: 197 LBS | TEMPERATURE: 97 F | HEART RATE: 68 BPM | HEIGHT: 67 IN | BODY MASS INDEX: 30.92 KG/M2 | DIASTOLIC BLOOD PRESSURE: 78 MMHG | RESPIRATION RATE: 18 BRPM | SYSTOLIC BLOOD PRESSURE: 112 MMHG | OXYGEN SATURATION: 98 %

## 2022-08-26 DIAGNOSIS — K21.9 GASTROESOPHAGEAL REFLUX DISEASE, UNSPECIFIED WHETHER ESOPHAGITIS PRESENT: Primary | ICD-10-CM

## 2022-08-26 PROCEDURE — 99213 PR OFFICE/OUTPT VISIT, EST, LEVL III, 20-29 MIN: ICD-10-PCS | Mod: ,,, | Performed by: FAMILY MEDICINE

## 2022-08-26 PROCEDURE — 1160F RVW MEDS BY RX/DR IN RCRD: CPT | Mod: ,,, | Performed by: FAMILY MEDICINE

## 2022-08-26 PROCEDURE — 4010F ACE/ARB THERAPY RXD/TAKEN: CPT | Mod: ,,, | Performed by: FAMILY MEDICINE

## 2022-08-26 PROCEDURE — 3078F DIAST BP <80 MM HG: CPT | Mod: ,,, | Performed by: FAMILY MEDICINE

## 2022-08-26 PROCEDURE — 3008F PR BODY MASS INDEX (BMI) DOCUMENTED: ICD-10-PCS | Mod: ,,, | Performed by: FAMILY MEDICINE

## 2022-08-26 PROCEDURE — 99213 OFFICE O/P EST LOW 20 MIN: CPT | Mod: ,,, | Performed by: FAMILY MEDICINE

## 2022-08-26 PROCEDURE — 3078F PR MOST RECENT DIASTOLIC BLOOD PRESSURE < 80 MM HG: ICD-10-PCS | Mod: ,,, | Performed by: FAMILY MEDICINE

## 2022-08-26 PROCEDURE — 3008F BODY MASS INDEX DOCD: CPT | Mod: ,,, | Performed by: FAMILY MEDICINE

## 2022-08-26 PROCEDURE — 1160F PR REVIEW ALL MEDS BY PRESCRIBER/CLIN PHARMACIST DOCUMENTED: ICD-10-PCS | Mod: ,,, | Performed by: FAMILY MEDICINE

## 2022-08-26 PROCEDURE — 3074F SYST BP LT 130 MM HG: CPT | Mod: ,,, | Performed by: FAMILY MEDICINE

## 2022-08-26 PROCEDURE — 4010F PR ACE/ARB THEARPY RXD/TAKEN: ICD-10-PCS | Mod: ,,, | Performed by: FAMILY MEDICINE

## 2022-08-26 PROCEDURE — 3074F PR MOST RECENT SYSTOLIC BLOOD PRESSURE < 130 MM HG: ICD-10-PCS | Mod: ,,, | Performed by: FAMILY MEDICINE

## 2022-08-26 PROCEDURE — 1159F MED LIST DOCD IN RCRD: CPT | Mod: ,,, | Performed by: FAMILY MEDICINE

## 2022-08-26 PROCEDURE — 1159F PR MEDICATION LIST DOCUMENTED IN MEDICAL RECORD: ICD-10-PCS | Mod: ,,, | Performed by: FAMILY MEDICINE

## 2022-08-26 RX ORDER — FAMOTIDINE 20 MG/1
20 TABLET, FILM COATED ORAL 2 TIMES DAILY
COMMUNITY
End: 2022-08-26 | Stop reason: SDUPTHER

## 2022-08-26 RX ORDER — FAMOTIDINE 20 MG/1
20 TABLET, FILM COATED ORAL 2 TIMES DAILY
Qty: 90 TABLET | Refills: 1 | Status: SHIPPED | OUTPATIENT
Start: 2022-08-26 | End: 2022-09-08

## 2022-08-26 NOTE — PROGRESS NOTES
Janene Castillo is a 62 y.o. female seen today for follow-up on her GERD.  Patient reports that she has been out of her Pepcid and has had more symptoms since.  We discussed her recent lab work and her LDL cholesterol was not to goal at 84.  Patient is statin intolerant we discussed the possibility of using an injectable cholesterol medication if her cardiac workup reveals any evidence of CAD.    Past Medical History:   Diagnosis Date    Allergic contact dermatitis     balsam of peru, bacitracin, fragrance, methyldibromo glutaronite    Anxiety     Chronic pain     HTN (hypertension)     Hyperlipidemia     Plaque psoriasis     Prurigo nodularis     Psoriatic arthritis     Stage 3b chronic kidney disease      Family History   Problem Relation Age of Onset    Hypertension Mother     Heart disease Father     Heart attack Father     Heart failure Father     Pacemaker/defibrilator Father     Melanoma Neg Hx      Current Outpatient Medications on File Prior to Visit   Medication Sig Dispense Refill    amLODIPine (NORVASC) 2.5 MG tablet Take 1 tablet by mouth once daily 90 tablet 0    benazepriL (LOTENSIN) 20 MG tablet Take 1 tablet by mouth once daily 90 tablet 0    buPROPion (WELLBUTRIN) 100 MG tablet Take 1 tablet (100 mg total) by mouth 2 (two) times daily. 180 tablet 1    calcipotriene-betamethasone (TACLONEX) ointment Apply to AA on body BID, tapering with improvement 60 g 2    ezetimibe (ZETIA) 10 mg tablet TAKE 1 TABLET BY MOUTH ONCE DAILY IN THE EVENING 90 tablet 0    fluticasone propionate (FLONASE) 50 mcg/actuation nasal spray 1 spray (50 mcg total) by Each Nostril route once daily. 16 g 1    hydrOXYzine pamoate (VISTARIL) 25 MG Cap TAKE 1 TO 2 CAPSULES BY MOUTH EVERY 8 HOURS AS NEEDED 60 capsule 0    mupirocin (BACTROBAN) 2 % ointment APPLY  OINTMENT TOPICALLY TO AFFECTED AREA THREE TIMES DAILY 22 g 0    oxyCODONE-acetaminophen (PERCOCET)  mg per tablet Take 1 tablet by mouth every 12  (twelve) hours as needed.      risankizumab-rzaa (SKYRIZI) 150 mg/mL PnIj inject 150mg/ml at week 0, 4 and then every 12 weeks thereafter      tiZANidine (ZANAFLEX) 4 MG tablet Take 1 tablet by mouth twice daily 90 tablet 0    valACYclovir (VALTREX) 1000 MG tablet TAKE 2 TABLETS BY MOUTH TWICE DAILY FOR COLD SYMPTOMS 4 tablet 0    varenicline (CHANTIX) 1 mg Tab Take 1 tablet by mouth twice daily 56 tablet 0    [DISCONTINUED] famotidine (PEPCID) 20 MG tablet Take 20 mg by mouth 2 (two) times daily.       No current facility-administered medications on file prior to visit.       There is no immunization history on file for this patient.    Review of Systems   Constitutional: Negative for fever, malaise/fatigue and weight loss.   Respiratory: Negative for shortness of breath.    Cardiovascular: Negative for chest pain and palpitations.   Gastrointestinal: Negative for nausea and vomiting.   Musculoskeletal: Positive for joint pain.   Skin: Positive for itching and rash.   Psychiatric/Behavioral: Negative for depression.        Vitals:    08/26/22 1440   BP: 112/78   Pulse: 68   Resp: 18   Temp: 97 °F (36.1 °C)       Physical Exam  Vitals reviewed.   Constitutional:       Appearance: Normal appearance.   HENT:      Head: Normocephalic.   Eyes:      Extraocular Movements: Extraocular movements intact.      Conjunctiva/sclera: Conjunctivae normal.      Pupils: Pupils are equal, round, and reactive to light.   Neck:      Thyroid: No thyroid mass or thyromegaly.   Cardiovascular:      Rate and Rhythm: Normal rate and regular rhythm.      Heart sounds: Normal heart sounds. No murmur heard.    No gallop.   Pulmonary:      Effort: Pulmonary effort is normal. No respiratory distress.      Breath sounds: Normal breath sounds. No wheezing or rales.   Skin:     General: Skin is warm and dry.      Coloration: Skin is not jaundiced or pale.   Neurological:      Mental Status: She is alert.   Psychiatric:         Mood and  Affect: Mood normal.         Behavior: Behavior normal.         Thought Content: Thought content normal.         Judgment: Judgment normal.          Assessment and Plan  Gastroesophageal reflux disease, unspecified whether esophagitis present  -     famotidine (PEPCID) 20 MG tablet; Take 1 tablet (20 mg total) by mouth 2 (two) times daily.  Dispense: 90 tablet; Refill: 1            Return to clinic in October for follow-up on her hyperlipidemia.    Health Maintenance Topics with due status: Not Due       Topic Last Completion Date    Cervical Cancer Screening 02/25/2020    Colorectal Cancer Screening 12/22/2020    Lipid Panel 07/01/2022    Mammogram 08/22/2022    Influenza Vaccine Not Due

## 2022-08-30 PROBLEM — R06.02 SOB (SHORTNESS OF BREATH): Status: ACTIVE | Noted: 2022-08-30

## 2022-08-30 PROBLEM — Z82.49 FAMILY HISTORY OF PREMATURE CORONARY ARTERY DISEASE: Chronic | Status: ACTIVE | Noted: 2022-08-30

## 2022-08-30 PROBLEM — I35.8 AORTIC HEART MURMUR: Status: ACTIVE | Noted: 2022-08-30

## 2022-08-30 PROBLEM — R07.89 CHEST DISCOMFORT: Status: ACTIVE | Noted: 2022-08-30

## 2022-08-30 NOTE — PROGRESS NOTES
[facility] note        DATE OF SERVICE: 2022       PCP: Asael Huizar MD      CHIEF COMPLAINT:   Chief Complaint   Patient presents with    Consult     Referred by Dr. JUAQUIN Huizar for heart murmur and abnormal EKG    Shortness of Breath     Every once in a while        HISTORY OF PRESENT ILLNESS:  Janene Castillo is a 62 y.o. female with a PMH of   Past Medical History:   Diagnosis Date    Allergic contact dermatitis     balsam of peru, bacitracin, fragrance, methyldibromo glutaronite    Anxiety     Chronic pain     HTN (hypertension)     Hyperlipidemia     Plaque psoriasis     Prurigo nodularis     Psoriatic arthritis     Stage 3b chronic kidney disease      who presents for   Chief Complaint   Patient presents with    Consult     Referred by Dr. JUAQUIN Huizar for heart murmur and abnormal EKG    Shortness of Breath     Every once in a while          Review of Systems: Review of Systems   Respiratory:  Positive for shortness of breath.    Cardiovascular:  Negative for chest pain, palpitations and leg swelling.   Neurological:  Negative for loss of consciousness.      PAST MEDICAL HISTORY:  Past Medical History:   Diagnosis Date    Allergic contact dermatitis     balsam of peru, bacitracin, fragrance, methyldibromo glutaronite    Anxiety     Chronic pain     HTN (hypertension)     Hyperlipidemia     Plaque psoriasis     Prurigo nodularis     Psoriatic arthritis     Stage 3b chronic kidney disease        PAST SURGICAL HISTORY:  Past Surgical History:   Procedure Laterality Date     SECTION         SOCIAL HISTORY:  Social History     Socioeconomic History    Marital status:    Tobacco Use    Smoking status: Former    Smokeless tobacco: Never   Substance and Sexual Activity    Alcohol use: Not Currently       FAMILY HISTORY:  Family History   Problem Relation Age of Onset    Hypertension Mother     Heart disease Father     Heart attack Father     Heart failure Father      Pacemaker/defibrilator Father     Melanoma Neg Hx          ALLERGIES:  Review of patient's allergies indicates:   Allergen Reactions    Bacitracin Dermatitis    Codeine sulfate     Codeine Rash     Rash     Methyldibromoglutaronitrile Dermatitis    Permethrin Rash     Rash     Sulfa (sulfonamide antibiotics) Other (See Comments) and Rash     unknown        MEDICATIONS:    Current Outpatient Medications:     amLODIPine (NORVASC) 2.5 MG tablet, Take 1 tablet by mouth once daily, Disp: 90 tablet, Rfl: 0    benazepriL (LOTENSIN) 20 MG tablet, Take 1 tablet by mouth once daily, Disp: 90 tablet, Rfl: 0    buPROPion (WELLBUTRIN) 100 MG tablet, Take 1 tablet (100 mg total) by mouth 2 (two) times daily., Disp: 180 tablet, Rfl: 1    calcipotriene-betamethasone (TACLONEX) ointment, Apply to AA on body BID, tapering with improvement, Disp: 60 g, Rfl: 2    ezetimibe (ZETIA) 10 mg tablet, TAKE 1 TABLET BY MOUTH ONCE DAILY IN THE EVENING, Disp: 90 tablet, Rfl: 0    famotidine (PEPCID) 20 MG tablet, Take 1 tablet (20 mg total) by mouth 2 (two) times daily., Disp: 90 tablet, Rfl: 1    fluticasone propionate (FLONASE) 50 mcg/actuation nasal spray, 1 spray (50 mcg total) by Each Nostril route once daily., Disp: 16 g, Rfl: 1    hydrOXYzine pamoate (VISTARIL) 25 MG Cap, TAKE 1 TO 2 CAPSULES BY MOUTH EVERY 8 HOURS AS NEEDED, Disp: 60 capsule, Rfl: 0    mupirocin (BACTROBAN) 2 % ointment, APPLY  OINTMENT TOPICALLY TO AFFECTED AREA THREE TIMES DAILY, Disp: 22 g, Rfl: 0    oxyCODONE-acetaminophen (PERCOCET)  mg per tablet, Take 1 tablet by mouth every 12 (twelve) hours as needed., Disp: , Rfl:     risankizumab-rzaa (SKYRIZI) 150 mg/mL PnIj, inject 150mg/ml at week 0, 4 and then every 12 weeks thereafter, Disp: , Rfl:     tiZANidine (ZANAFLEX) 4 MG tablet, Take 1 tablet by mouth twice daily, Disp: 90 tablet, Rfl: 0    valACYclovir (VALTREX) 1000 MG tablet, TAKE 2 TABLETS BY MOUTH TWICE DAILY FOR COLD SYMPTOMS, Disp: 4 tablet, Rfl: 0     "varenicline (CHANTIX) 1 mg Tab, Take 1 tablet by mouth twice daily, Disp: 56 tablet, Rfl: 0     PHYSICAL EXAM:  /88 (BP Location: Left arm, Patient Position: Sitting)   Pulse 76   Resp 16   Ht 5' 7" (1.702 m)   Wt 89.4 kg (197 lb)   BMI 30.85 kg/m²   Wt Readings from Last 3 Encounters:   08/26/22 89.4 kg (197 lb)   08/23/22 89.4 kg (197 lb)   07/29/22 90.7 kg (200 lb)      Body mass index is 30.85 kg/m².    Physical Exam  Vitals reviewed.   Constitutional:       Appearance: Normal appearance.   HENT:      Head: Normocephalic and atraumatic.   Neck:      Vascular: No carotid bruit or JVD.   Cardiovascular:      Rate and Rhythm: Normal rate and regular rhythm.      Pulses: Normal pulses.           Radial pulses are 2+ on the right side and 2+ on the left side.        Dorsalis pedis pulses are 2+ on the right side and 2+ on the left side.      Heart sounds: Murmur heard.   Systolic murmur is present with a grade of 2/6.      Comments: II/ VI HAO RUSB  Pulmonary:      Effort: Pulmonary effort is normal.      Breath sounds: Normal breath sounds.   Musculoskeletal:      Right lower leg: No edema.      Left lower leg: No edema.   Skin:     General: Skin is warm and dry.   Neurological:      Mental Status: She is alert.     LABS REVIEWED:  Lab Results   Component Value Date    WBC 5.14 07/01/2022    RBC 4.54 07/01/2022    HGB 12.4 07/01/2022    HCT 39.6 07/01/2022    MCV 87.2 07/01/2022    MCH 27.3 07/01/2022    MCHC 31.3 (L) 07/01/2022    RDW 14.0 07/01/2022     07/01/2022    MPV 11.3 07/01/2022    NRBC 0.0 07/01/2022     Lab Results   Component Value Date     07/01/2022    K 4.8 07/01/2022     (H) 07/01/2022    CO2 27 07/01/2022    BUN 14 07/01/2022    MG 2.5 (H) 10/15/2020     Lab Results   Component Value Date    AST 14 (L) 07/01/2022    ALT 16 07/01/2022     Lab Results   Component Value Date    GLU 74 07/01/2022     Lab Results   Component Value Date    CHOL 147 07/01/2022    HDL 45 " 07/01/2022    TRIG 102 07/01/2022    CHOLHDL 3.3 07/01/2022         ASSESSMENT:   Active Problem List with Overview Notes    Diagnosis Date Noted    Chest discomfort 08/30/2022    Aortic heart murmur 08/30/2022    SOB (shortness of breath) 08/30/2022    Family history of premature coronary artery disease 08/30/2022    Stage 3b chronic kidney disease 10/26/2021    Statin intolerance 10/26/2021    Gingivitis 09/09/2021    Pain, dental 09/09/2021    Hypertension 05/27/2021    Hyperlipidemia 05/27/2021    Gastroesophageal reflux disease 05/27/2021    Sweats, menopausal 05/27/2021     VISIT DIAGNOSIS:  Chest discomfort  -     Echo; Future; Expected date: 09/06/2022  -     X-Ray Chest PA And Lateral; Future; Expected date: 08/23/2022  -     Exercise Stress - EKG; Future; Expected date: 09/23/2022    Abnormal EKG  Comments:  6/30/22--sinus rhythm with PVC's  Orders:  -     Ambulatory referral/consult to Cardiology    Aortic heart murmur  -     Echo; Future; Expected date: 09/06/2022  -     X-Ray Chest PA And Lateral; Future; Expected date: 08/23/2022  -     Exercise Stress - EKG; Future; Expected date: 09/23/2022    SOB (shortness of breath)    Hypertension, unspecified type    Hyperlipidemia, unspecified hyperlipidemia type    Stage 3b chronic kidney disease    Family history of premature coronary artery disease       PLAN:  Orders Placed This Encounter   Procedures    X-Ray Chest PA And Lateral     Standing Status:   Future     Number of Occurrences:   1     Standing Expiration Date:   8/23/2023     Order Specific Question:   May the Radiologist modify the order per protocol to meet the clinical needs of the patient?     Answer:   Yes     Order Specific Question:   Release to patient     Answer:   Immediate    Exercise Stress - EKG     Standing Status:   Future     Standing Expiration Date:   9/23/2022     Order Specific Question:   Release to patient     Answer:   Immediate    Echo     Standing Status:   Future      Standing Expiration Date:   9/23/2022     Order Specific Question:   Color Doppler?     Answer:   Yes     Order Specific Question:   Release to patient     Answer:   Immediate      RTC  After tests.

## 2022-08-31 ENCOUNTER — HOSPITAL ENCOUNTER (OUTPATIENT)
Dept: CARDIOLOGY | Facility: HOSPITAL | Age: 62
Discharge: HOME | End: 2022-08-31
Attending: INTERNAL MEDICINE
Payer: MEDICARE

## 2022-08-31 DIAGNOSIS — I35.8 AORTIC HEART MURMUR: ICD-10-CM

## 2022-08-31 DIAGNOSIS — R07.89 CHEST DISCOMFORT: ICD-10-CM

## 2022-08-31 PROCEDURE — 93016 CV STRESS TEST SUPVJ ONLY: CPT | Mod: ,,, | Performed by: NURSE PRACTITIONER

## 2022-08-31 PROCEDURE — 93016 EXERCISE STRESS - EKG (CUPID ONLY): ICD-10-PCS | Mod: ,,, | Performed by: NURSE PRACTITIONER

## 2022-08-31 PROCEDURE — 93017 CV STRESS TEST TRACING ONLY: CPT

## 2022-08-31 PROCEDURE — 93306 TTE W/DOPPLER COMPLETE: CPT

## 2022-08-31 PROCEDURE — 93018 EXERCISE STRESS - EKG (CUPID ONLY): ICD-10-PCS | Mod: ,,, | Performed by: INTERNAL MEDICINE

## 2022-08-31 PROCEDURE — 93306 TTE W/DOPPLER COMPLETE: CPT | Mod: 26,,, | Performed by: INTERNAL MEDICINE

## 2022-08-31 PROCEDURE — 93018 CV STRESS TEST I&R ONLY: CPT | Mod: ,,, | Performed by: INTERNAL MEDICINE

## 2022-08-31 PROCEDURE — 93306 ECHO (CUPID ONLY): ICD-10-PCS | Mod: 26,,, | Performed by: INTERNAL MEDICINE

## 2022-09-01 LAB
AORTIC ROOT ANNULUS: 2.9 CM
AORTIC VALVE CUSP SEPERATION: 23.7 CM
AV INDEX (PROSTH): 0.69
AV MEAN GRADIENT: 5 MMHG
AV PEAK GRADIENT: 8 MMHG
AV VALVE AREA: 2.39 CM2
AV VELOCITY RATIO: 0.71
CV ECHO LV RWT: 0.41 CM
CV STRESS BASE HR: 66 BPM
DIASTOLIC BLOOD PRESSURE: 89 MMHG
DOP CALC AO PEAK VEL: 1.4 M/S
DOP CALC AO VTI: 29 CM
DOP CALC LVOT AREA: 3.5 CM2
DOP CALC LVOT DIAMETER: 2.1 CM
DOP CALC LVOT PEAK VEL: 1 M/S
DOP CALC LVOT STROKE VOLUME: 69.24 CM3
DOP CALCLVOT PEAK VEL VTI: 20 CM
E WAVE DECELERATION TIME: 246 MSEC
ECHO EF ESTIMATED: 60 %
ECHO LV POSTERIOR WALL: 0.91 CM (ref 0.6–1.1)
EJECTION FRACTION: 70 %
FRACTIONAL SHORTENING: 30 % (ref 28–44)
INTERVENTRICULAR SEPTUM: 0.85 CM (ref 0.6–1.1)
IVC OSTIUM: 1.1 CM
LEFT ATRIUM SIZE: 3.3 CM
LEFT INTERNAL DIMENSION IN SYSTOLE: 3.1 CM (ref 2.1–4)
LEFT VENTRICLE DIASTOLIC VOLUME: 88.6 ML
LEFT VENTRICLE SYSTOLIC VOLUME: 37.9 ML
LEFT VENTRICULAR INTERNAL DIMENSION IN DIASTOLE: 4.42 CM (ref 3.5–6)
LEFT VENTRICULAR MASS: 125.13 G
LVOT MG: 2 MMHG
MV PEAK E VEL: 0.67 M/S
OHS CV CPX 1 MINUTE RECOVERY HEART RATE: 113 BPM
OHS CV CPX 85 PERCENT MAX PREDICTED HEART RATE MALE: 130
OHS CV CPX ESTIMATED METS: 6
OHS CV CPX MAX PREDICTED HEART RATE: 153
OHS CV CPX PATIENT IS FEMALE: 1
OHS CV CPX PATIENT IS MALE: 0
OHS CV CPX PEAK DIASTOLIC BLOOD PRESSURE: 101 MMHG
OHS CV CPX PEAK HEAR RATE: 150 BPM
OHS CV CPX PEAK RATE PRESSURE PRODUCT: NORMAL
OHS CV CPX PEAK SYSTOLIC BLOOD PRESSURE: 175 MMHG
OHS CV CPX PERCENT MAX PREDICTED HEART RATE ACHIEVED: 98
OHS CV CPX RATE PRESSURE PRODUCT PRESENTING: 9636
PISA TR MAX VEL: 2.6 M/S
RA MAJOR: 4.3 CM
RA PRESSURE: 3 MMHG
RIGHT VENTRICULAR END-DIASTOLIC DIMENSION: 4.1 CM
STRESS ECHO POST EXERCISE DUR MIN: 4 MINUTES
STRESS ECHO POST EXERCISE DUR SEC: 27 SECONDS
SYSTOLIC BLOOD PRESSURE: 146 MMHG
TR MAX PG: 27 MMHG
TRICUSPID ANNULAR PLANE SYSTOLIC EXCURSION: 2.3 CM
TV REST PULMONARY ARTERY PRESSURE: 30 MMHG

## 2022-09-08 ENCOUNTER — OFFICE VISIT (OUTPATIENT)
Dept: CARDIOLOGY | Facility: CLINIC | Age: 62
End: 2022-09-08
Payer: MEDICARE

## 2022-09-08 VITALS
OXYGEN SATURATION: 99 % | BODY MASS INDEX: 30.73 KG/M2 | WEIGHT: 195.81 LBS | HEART RATE: 61 BPM | HEIGHT: 67 IN | DIASTOLIC BLOOD PRESSURE: 88 MMHG | SYSTOLIC BLOOD PRESSURE: 138 MMHG

## 2022-09-08 DIAGNOSIS — I20.89 ANGINAL EQUIVALENT: ICD-10-CM

## 2022-09-08 DIAGNOSIS — R06.02 SOB (SHORTNESS OF BREATH): Primary | ICD-10-CM

## 2022-09-08 DIAGNOSIS — R94.30 ABNORMAL RESULTS OF CARDIOVASCULAR FUNCTION STUDIES: ICD-10-CM

## 2022-09-08 PROCEDURE — 3008F BODY MASS INDEX DOCD: CPT | Mod: CPTII,,, | Performed by: NURSE PRACTITIONER

## 2022-09-08 PROCEDURE — 1160F RVW MEDS BY RX/DR IN RCRD: CPT | Mod: CPTII,,, | Performed by: NURSE PRACTITIONER

## 2022-09-08 PROCEDURE — 3079F PR MOST RECENT DIASTOLIC BLOOD PRESSURE 80-89 MM HG: ICD-10-PCS | Mod: CPTII,,, | Performed by: NURSE PRACTITIONER

## 2022-09-08 PROCEDURE — 99214 PR OFFICE/OUTPT VISIT, EST, LEVL IV, 30-39 MIN: ICD-10-PCS | Mod: S$PBB,,, | Performed by: NURSE PRACTITIONER

## 2022-09-08 PROCEDURE — 4010F ACE/ARB THERAPY RXD/TAKEN: CPT | Mod: CPTII,,, | Performed by: NURSE PRACTITIONER

## 2022-09-08 PROCEDURE — 99214 OFFICE O/P EST MOD 30 MIN: CPT | Mod: PBBFAC | Performed by: NURSE PRACTITIONER

## 2022-09-08 PROCEDURE — 3075F SYST BP GE 130 - 139MM HG: CPT | Mod: CPTII,,, | Performed by: NURSE PRACTITIONER

## 2022-09-08 PROCEDURE — 4010F PR ACE/ARB THEARPY RXD/TAKEN: ICD-10-PCS | Mod: CPTII,,, | Performed by: NURSE PRACTITIONER

## 2022-09-08 PROCEDURE — 3075F PR MOST RECENT SYSTOLIC BLOOD PRESS GE 130-139MM HG: ICD-10-PCS | Mod: CPTII,,, | Performed by: NURSE PRACTITIONER

## 2022-09-08 PROCEDURE — 1159F MED LIST DOCD IN RCRD: CPT | Mod: CPTII,,, | Performed by: NURSE PRACTITIONER

## 2022-09-08 PROCEDURE — 99214 OFFICE O/P EST MOD 30 MIN: CPT | Mod: S$PBB,,, | Performed by: NURSE PRACTITIONER

## 2022-09-08 PROCEDURE — 1160F PR REVIEW ALL MEDS BY PRESCRIBER/CLIN PHARMACIST DOCUMENTED: ICD-10-PCS | Mod: CPTII,,, | Performed by: NURSE PRACTITIONER

## 2022-09-08 PROCEDURE — 1159F PR MEDICATION LIST DOCUMENTED IN MEDICAL RECORD: ICD-10-PCS | Mod: CPTII,,, | Performed by: NURSE PRACTITIONER

## 2022-09-08 PROCEDURE — 3079F DIAST BP 80-89 MM HG: CPT | Mod: CPTII,,, | Performed by: NURSE PRACTITIONER

## 2022-09-08 PROCEDURE — 3008F PR BODY MASS INDEX (BMI) DOCUMENTED: ICD-10-PCS | Mod: CPTII,,, | Performed by: NURSE PRACTITIONER

## 2022-09-08 RX ORDER — DIPHENHYDRAMINE HCL 25 MG
50 CAPSULE ORAL
Status: CANCELLED | OUTPATIENT
Start: 2022-09-29

## 2022-09-08 RX ORDER — DIAZEPAM 2 MG/1
5 TABLET ORAL ONCE
Status: CANCELLED | OUTPATIENT
Start: 2022-09-29

## 2022-09-08 NOTE — PROGRESS NOTES
[facility] note        DATE OF SERVICE: 2022       PCP: Asael Huizar MD      CHIEF COMPLAINT:   Chief Complaint   Patient presents with    Palpitations     At times.    Edema     Goes down at night.        HISTORY OF PRESENT ILLNESS:  Janene Castillo is a 62 y.o. female with a PMH of   Past Medical History:   Diagnosis Date    Allergic contact dermatitis     balsam of peru, bacitracin, fragrance, methyldibromo glutaronite    Anxiety     Chronic pain     HTN (hypertension)     Hyperlipidemia     Plaque psoriasis     Prurigo nodularis     Psoriatic arthritis     Stage 3b chronic kidney disease      who presents for follow up to discuss the results of her EST/echo.  Chief Complaint   Patient presents with    Palpitations     At times.    Edema     Goes down at night.          Review of Systems: Review of Systems   Respiratory:  Positive for shortness of breath.    Cardiovascular:  Negative for chest pain, palpitations and leg swelling.   Neurological:  Negative for loss of consciousness.      PAST MEDICAL HISTORY:  Past Medical History:   Diagnosis Date    Allergic contact dermatitis     balsam of peru, bacitracin, fragrance, methyldibromo glutaronite    Anxiety     Chronic pain     HTN (hypertension)     Hyperlipidemia     Plaque psoriasis     Prurigo nodularis     Psoriatic arthritis     Stage 3b chronic kidney disease        PAST SURGICAL HISTORY:  Past Surgical History:   Procedure Laterality Date     SECTION         SOCIAL HISTORY:  Social History     Socioeconomic History    Marital status:    Tobacco Use    Smoking status: Former    Smokeless tobacco: Never   Substance and Sexual Activity    Alcohol use: Not Currently       FAMILY HISTORY:  Family History   Problem Relation Age of Onset    Hypertension Mother     Heart disease Father     Heart attack Father     Heart failure Father     Pacemaker/defibrilator Father     Melanoma Neg Hx          ALLERGIES:  Review of  "patient's allergies indicates:   Allergen Reactions    Bacitracin Dermatitis    Codeine sulfate     Codeine Rash     Rash     Methyldibromoglutaronitrile Dermatitis    Permethrin Rash     Rash     Sulfa (sulfonamide antibiotics) Other (See Comments) and Rash     unknown        MEDICATIONS:    Current Outpatient Medications:     amLODIPine (NORVASC) 2.5 MG tablet, Take 1 tablet by mouth once daily, Disp: 90 tablet, Rfl: 0    benazepriL (LOTENSIN) 20 MG tablet, Take 1 tablet by mouth once daily, Disp: 90 tablet, Rfl: 0    buPROPion (WELLBUTRIN) 100 MG tablet, Take 1 tablet (100 mg total) by mouth 2 (two) times daily., Disp: 180 tablet, Rfl: 1    calcipotriene-betamethasone (TACLONEX) ointment, Apply to AA on body BID, tapering with improvement, Disp: 60 g, Rfl: 2    ezetimibe (ZETIA) 10 mg tablet, TAKE 1 TABLET BY MOUTH ONCE DAILY IN THE EVENING, Disp: 90 tablet, Rfl: 0    hydrOXYzine pamoate (VISTARIL) 25 MG Cap, TAKE 1 TO 2 CAPSULES BY MOUTH EVERY 8 HOURS AS NEEDED, Disp: 60 capsule, Rfl: 0    mupirocin (BACTROBAN) 2 % ointment, APPLY  OINTMENT TOPICALLY TO AFFECTED AREA THREE TIMES DAILY, Disp: 22 g, Rfl: 0    oxyCODONE-acetaminophen (PERCOCET)  mg per tablet, Take 1 tablet by mouth every 12 (twelve) hours as needed., Disp: , Rfl:     risankizumab-rzaa (SKYRIZI) 150 mg/mL PnIj, inject 150mg/ml at week 0, 4 and then every 12 weeks thereafter, Disp: , Rfl:     tiZANidine (ZANAFLEX) 4 MG tablet, Take 1 tablet by mouth twice daily, Disp: 90 tablet, Rfl: 0    valACYclovir (VALTREX) 1000 MG tablet, TAKE 2 TABLETS BY MOUTH TWICE DAILY FOR COLD SYMPTOMS, Disp: 4 tablet, Rfl: 0    varenicline (CHANTIX) 1 mg Tab, Take 1 tablet by mouth twice daily, Disp: 56 tablet, Rfl: 0     PHYSICAL EXAM:  /88 (BP Location: Left arm, Patient Position: Sitting)   Pulse 61   Ht 5' 7" (1.702 m)   Wt 88.8 kg (195 lb 12.8 oz)   SpO2 99%   BMI 30.67 kg/m²   Wt Readings from Last 3 Encounters:   09/08/22 88.8 kg (195 lb 12.8 oz) "   08/26/22 89.4 kg (197 lb)   08/23/22 89.4 kg (197 lb)      Body mass index is 30.67 kg/m².    Physical Exam  Vitals reviewed.   Constitutional:       Appearance: Normal appearance.   HENT:      Head: Normocephalic and atraumatic.   Neck:      Vascular: No carotid bruit or JVD.   Cardiovascular:      Rate and Rhythm: Normal rate and regular rhythm.      Pulses: Normal pulses.           Radial pulses are 2+ on the right side and 2+ on the left side.        Dorsalis pedis pulses are 2+ on the right side and 2+ on the left side.      Heart sounds: Murmur heard.   Systolic murmur is present with a grade of 2/6.      Comments: II/ VI HAO RUSB  Pulmonary:      Effort: Pulmonary effort is normal.      Breath sounds: Normal breath sounds.   Musculoskeletal:      Right lower leg: No edema.      Left lower leg: No edema.   Skin:     General: Skin is warm and dry.   Neurological:      Mental Status: She is alert.     LABS REVIEWED:  Lab Results   Component Value Date    WBC 5.14 07/01/2022    RBC 4.54 07/01/2022    HGB 12.4 07/01/2022    HCT 39.6 07/01/2022    MCV 87.2 07/01/2022    MCH 27.3 07/01/2022    MCHC 31.3 (L) 07/01/2022    RDW 14.0 07/01/2022     07/01/2022    MPV 11.3 07/01/2022    NRBC 0.0 07/01/2022     Lab Results   Component Value Date     07/01/2022    K 4.8 07/01/2022     (H) 07/01/2022    CO2 27 07/01/2022    BUN 14 07/01/2022    MG 2.5 (H) 10/15/2020     Lab Results   Component Value Date    AST 14 (L) 07/01/2022    ALT 16 07/01/2022     Lab Results   Component Value Date    GLU 74 07/01/2022     Lab Results   Component Value Date    CHOL 147 07/01/2022    HDL 45 07/01/2022    TRIG 102 07/01/2022    CHOLHDL 3.3 07/01/2022     Cadiac studyes reviewed:      Echocardiogram 8/23/2022  Summary    The left ventricle is normal in size with eccentric hypertrophy and normal systolic function.  The estimated ejection fraction is 70%.  Normal left ventricular diastolic function.  Mild right  ventricular enlargement.  Mild right atrial enlargement.  Mild mitral regurgitation.  Mild tricuspid regurgitation.  Normal central venous pressure (3 mmHg).  The estimated PA systolic pressure is 30 mmHg.  Trivial pericardial effusion.   EST 8/23/2022  Conclusion         The EKG portion of this study is abnormal but not diagnostic.    The patient reported no chest pain during the stress test.     ST depression II, V6 suggestive of coronary ischemia.     Conclusion:  Abnormal.      ASSESSMENT:   Active Problem List with Overview Notes    Diagnosis Date Noted    Abnormal results of cardiovascular function studies 09/08/2022    Chest discomfort 08/30/2022    Aortic heart murmur 08/30/2022    SOB (shortness of breath) 08/30/2022    Family history of premature coronary artery disease 08/30/2022    Stage 3b chronic kidney disease 10/26/2021    Statin intolerance 10/26/2021    Gingivitis 09/09/2021    Pain, dental 09/09/2021    Hypertension 05/27/2021    Hyperlipidemia 05/27/2021    Gastroesophageal reflux disease 05/27/2021    Sweats, menopausal 05/27/2021     VISIT DIAGNOSIS:  SOB (shortness of breath)  -     Establish IV access and maintain saline lock; Standing  -     Height and weight; Standing  -     Void; Standing  -     Clip and Prep Right Femoral, Left Femoral; Standing  -     Verify informed consent; Standing  -     Vital signs; Standing  -     Cardiac Monitoring - Adult; Standing  -     Pulse Oximetry Q4H; Standing  -     Diet NPO; Standing  -     Full code; Standing  -     Place in Outpatient; Standing    Abnormal results of cardiovascular function studies  -     Case Request-Cath Lab: Angiogram, Coronary, with Left Heart Cath, Percutaneous coronary intervention; Standing  -     Establish IV access and maintain saline lock; Standing  -     Height and weight; Standing  -     Void; Standing  -     Clip and Prep Right Femoral, Left Femoral; Standing  -     Verify informed consent; Standing  -     Vital signs;  Standing  -     Cardiac Monitoring - Adult; Standing  -     Pulse Oximetry Q4H; Standing  -     Diet NPO; Standing  -     Full code; Standing  -     Place in Outpatient; Standing    Anginal equivalent  -     Establish IV access and maintain saline lock; Standing  -     Height and weight; Standing  -     Void; Standing  -     Clip and Prep Right Femoral, Left Femoral; Standing  -     Verify informed consent; Standing  -     Vital signs; Standing  -     Cardiac Monitoring - Adult; Standing  -     Pulse Oximetry Q4H; Standing  -     Diet NPO; Standing  -     Full code; Standing  -     Place in Outpatient; Standing    Other orders  -     diazePAM tablet 6 mg  -     diphenhydrAMINE capsule 50 mg       PLAN: I d/w the patient the resultsof her EST/echo. We also discussed the r/b/a of [facility] with poss PCI. She wishes to proceed.   Orders Placed This Encounter   Procedures    Case Request-Cath Lab: Angiogram, Coronary, with Left Heart Cath, Percutaneous coronary intervention     Standing Status:   Standing     Number of Occurrences:   1     Order Specific Question:   CPT Code:     Answer:   [place] CATH PLACE/CORON ANGIO, IMG SUPER/INTERP,W LEFT HEART VENTRICULOGRAPHY [35700]     Order Specific Question:   CPT Code:     Answer:   [place]  [48346]     Order Specific Question:   Medical Necessity:     Answer:   Medically Non-Urgent [100]     Order Specific Question:   Is an on-site pathologist required for this procedure?     Answer:   N/A     Order Specific Question:   Pre-op Diagnosis     Answer:   Abnormal results of cardiovascular function studies [461779]     Order Specific Question:   Pre-op Diagnosis     Answer:   SOB (shortness of breath) [468040]     Order Specific Question:   Pre-op Diagnosis     Answer:   Anginal equivalent [[id number]]      RTC  After C with poss pci

## 2022-09-08 NOTE — H&P (VIEW-ONLY)
[facility] note        DATE OF SERVICE: 2022       PCP: Asael Huizar MD      CHIEF COMPLAINT:   Chief Complaint   Patient presents with    Palpitations     At times.    Edema     Goes down at night.        HISTORY OF PRESENT ILLNESS:  Janene Castillo is a 62 y.o. female with a PMH of   Past Medical History:   Diagnosis Date    Allergic contact dermatitis     balsam of peru, bacitracin, fragrance, methyldibromo glutaronite    Anxiety     Chronic pain     HTN (hypertension)     Hyperlipidemia     Plaque psoriasis     Prurigo nodularis     Psoriatic arthritis     Stage 3b chronic kidney disease      who presents for follow up to discuss the results of her EST/echo.  Chief Complaint   Patient presents with    Palpitations     At times.    Edema     Goes down at night.          Review of Systems: Review of Systems   Respiratory:  Positive for shortness of breath.    Cardiovascular:  Negative for chest pain, palpitations and leg swelling.   Neurological:  Negative for loss of consciousness.      PAST MEDICAL HISTORY:  Past Medical History:   Diagnosis Date    Allergic contact dermatitis     balsam of peru, bacitracin, fragrance, methyldibromo glutaronite    Anxiety     Chronic pain     HTN (hypertension)     Hyperlipidemia     Plaque psoriasis     Prurigo nodularis     Psoriatic arthritis     Stage 3b chronic kidney disease        PAST SURGICAL HISTORY:  Past Surgical History:   Procedure Laterality Date     SECTION         SOCIAL HISTORY:  Social History     Socioeconomic History    Marital status:    Tobacco Use    Smoking status: Former    Smokeless tobacco: Never   Substance and Sexual Activity    Alcohol use: Not Currently       FAMILY HISTORY:  Family History   Problem Relation Age of Onset    Hypertension Mother     Heart disease Father     Heart attack Father     Heart failure Father     Pacemaker/defibrilator Father     Melanoma Neg Hx          ALLERGIES:  Review of  "patient's allergies indicates:   Allergen Reactions    Bacitracin Dermatitis    Codeine sulfate     Codeine Rash     Rash     Methyldibromoglutaronitrile Dermatitis    Permethrin Rash     Rash     Sulfa (sulfonamide antibiotics) Other (See Comments) and Rash     unknown        MEDICATIONS:    Current Outpatient Medications:     amLODIPine (NORVASC) 2.5 MG tablet, Take 1 tablet by mouth once daily, Disp: 90 tablet, Rfl: 0    benazepriL (LOTENSIN) 20 MG tablet, Take 1 tablet by mouth once daily, Disp: 90 tablet, Rfl: 0    buPROPion (WELLBUTRIN) 100 MG tablet, Take 1 tablet (100 mg total) by mouth 2 (two) times daily., Disp: 180 tablet, Rfl: 1    calcipotriene-betamethasone (TACLONEX) ointment, Apply to AA on body BID, tapering with improvement, Disp: 60 g, Rfl: 2    ezetimibe (ZETIA) 10 mg tablet, TAKE 1 TABLET BY MOUTH ONCE DAILY IN THE EVENING, Disp: 90 tablet, Rfl: 0    hydrOXYzine pamoate (VISTARIL) 25 MG Cap, TAKE 1 TO 2 CAPSULES BY MOUTH EVERY 8 HOURS AS NEEDED, Disp: 60 capsule, Rfl: 0    mupirocin (BACTROBAN) 2 % ointment, APPLY  OINTMENT TOPICALLY TO AFFECTED AREA THREE TIMES DAILY, Disp: 22 g, Rfl: 0    oxyCODONE-acetaminophen (PERCOCET)  mg per tablet, Take 1 tablet by mouth every 12 (twelve) hours as needed., Disp: , Rfl:     risankizumab-rzaa (SKYRIZI) 150 mg/mL PnIj, inject 150mg/ml at week 0, 4 and then every 12 weeks thereafter, Disp: , Rfl:     tiZANidine (ZANAFLEX) 4 MG tablet, Take 1 tablet by mouth twice daily, Disp: 90 tablet, Rfl: 0    valACYclovir (VALTREX) 1000 MG tablet, TAKE 2 TABLETS BY MOUTH TWICE DAILY FOR COLD SYMPTOMS, Disp: 4 tablet, Rfl: 0    varenicline (CHANTIX) 1 mg Tab, Take 1 tablet by mouth twice daily, Disp: 56 tablet, Rfl: 0     PHYSICAL EXAM:  /88 (BP Location: Left arm, Patient Position: Sitting)   Pulse 61   Ht 5' 7" (1.702 m)   Wt 88.8 kg (195 lb 12.8 oz)   SpO2 99%   BMI 30.67 kg/m²   Wt Readings from Last 3 Encounters:   09/08/22 88.8 kg (195 lb 12.8 oz) "   08/26/22 89.4 kg (197 lb)   08/23/22 89.4 kg (197 lb)      Body mass index is 30.67 kg/m².    Physical Exam  Vitals reviewed.   Constitutional:       Appearance: Normal appearance.   HENT:      Head: Normocephalic and atraumatic.   Neck:      Vascular: No carotid bruit or JVD.   Cardiovascular:      Rate and Rhythm: Normal rate and regular rhythm.      Pulses: Normal pulses.           Radial pulses are 2+ on the right side and 2+ on the left side.        Dorsalis pedis pulses are 2+ on the right side and 2+ on the left side.      Heart sounds: Murmur heard.   Systolic murmur is present with a grade of 2/6.      Comments: II/ VI HAO RUSB  Pulmonary:      Effort: Pulmonary effort is normal.      Breath sounds: Normal breath sounds.   Musculoskeletal:      Right lower leg: No edema.      Left lower leg: No edema.   Skin:     General: Skin is warm and dry.   Neurological:      Mental Status: She is alert.     LABS REVIEWED:  Lab Results   Component Value Date    WBC 5.14 07/01/2022    RBC 4.54 07/01/2022    HGB 12.4 07/01/2022    HCT 39.6 07/01/2022    MCV 87.2 07/01/2022    MCH 27.3 07/01/2022    MCHC 31.3 (L) 07/01/2022    RDW 14.0 07/01/2022     07/01/2022    MPV 11.3 07/01/2022    NRBC 0.0 07/01/2022     Lab Results   Component Value Date     07/01/2022    K 4.8 07/01/2022     (H) 07/01/2022    CO2 27 07/01/2022    BUN 14 07/01/2022    MG 2.5 (H) 10/15/2020     Lab Results   Component Value Date    AST 14 (L) 07/01/2022    ALT 16 07/01/2022     Lab Results   Component Value Date    GLU 74 07/01/2022     Lab Results   Component Value Date    CHOL 147 07/01/2022    HDL 45 07/01/2022    TRIG 102 07/01/2022    CHOLHDL 3.3 07/01/2022     Cadiac studyes reviewed:      Echocardiogram 8/23/2022  Summary    The left ventricle is normal in size with eccentric hypertrophy and normal systolic function.  The estimated ejection fraction is 70%.  Normal left ventricular diastolic function.  Mild right  ventricular enlargement.  Mild right atrial enlargement.  Mild mitral regurgitation.  Mild tricuspid regurgitation.  Normal central venous pressure (3 mmHg).  The estimated PA systolic pressure is 30 mmHg.  Trivial pericardial effusion.   EST 8/23/2022  Conclusion         The EKG portion of this study is abnormal but not diagnostic.    The patient reported no chest pain during the stress test.     ST depression II, V6 suggestive of coronary ischemia.     Conclusion:  Abnormal.      ASSESSMENT:   Active Problem List with Overview Notes    Diagnosis Date Noted    Abnormal results of cardiovascular function studies 09/08/2022    Chest discomfort 08/30/2022    Aortic heart murmur 08/30/2022    SOB (shortness of breath) 08/30/2022    Family history of premature coronary artery disease 08/30/2022    Stage 3b chronic kidney disease 10/26/2021    Statin intolerance 10/26/2021    Gingivitis 09/09/2021    Pain, dental 09/09/2021    Hypertension 05/27/2021    Hyperlipidemia 05/27/2021    Gastroesophageal reflux disease 05/27/2021    Sweats, menopausal 05/27/2021     VISIT DIAGNOSIS:  SOB (shortness of breath)  -     Establish IV access and maintain saline lock; Standing  -     Height and weight; Standing  -     Void; Standing  -     Clip and Prep Right Femoral, Left Femoral; Standing  -     Verify informed consent; Standing  -     Vital signs; Standing  -     Cardiac Monitoring - Adult; Standing  -     Pulse Oximetry Q4H; Standing  -     Diet NPO; Standing  -     Full code; Standing  -     Place in Outpatient; Standing    Abnormal results of cardiovascular function studies  -     Case Request-Cath Lab: Angiogram, Coronary, with Left Heart Cath, Percutaneous coronary intervention; Standing  -     Establish IV access and maintain saline lock; Standing  -     Height and weight; Standing  -     Void; Standing  -     Clip and Prep Right Femoral, Left Femoral; Standing  -     Verify informed consent; Standing  -     Vital signs;  Standing  -     Cardiac Monitoring - Adult; Standing  -     Pulse Oximetry Q4H; Standing  -     Diet NPO; Standing  -     Full code; Standing  -     Place in Outpatient; Standing    Anginal equivalent  -     Establish IV access and maintain saline lock; Standing  -     Height and weight; Standing  -     Void; Standing  -     Clip and Prep Right Femoral, Left Femoral; Standing  -     Verify informed consent; Standing  -     Vital signs; Standing  -     Cardiac Monitoring - Adult; Standing  -     Pulse Oximetry Q4H; Standing  -     Diet NPO; Standing  -     Full code; Standing  -     Place in Outpatient; Standing    Other orders  -     diazePAM tablet 6 mg  -     diphenhydrAMINE capsule 50 mg       PLAN: I d/w the patient the resultsof her EST/echo. We also discussed the r/b/a of [facility] with poss PCI. She wishes to proceed.   Orders Placed This Encounter   Procedures    Case Request-Cath Lab: Angiogram, Coronary, with Left Heart Cath, Percutaneous coronary intervention     Standing Status:   Standing     Number of Occurrences:   1     Order Specific Question:   CPT Code:     Answer:   [place] CATH PLACE/CORON ANGIO, IMG SUPER/INTERP,W LEFT HEART VENTRICULOGRAPHY [26314]     Order Specific Question:   CPT Code:     Answer:   [place]  [79663]     Order Specific Question:   Medical Necessity:     Answer:   Medically Non-Urgent [100]     Order Specific Question:   Is an on-site pathologist required for this procedure?     Answer:   N/A     Order Specific Question:   Pre-op Diagnosis     Answer:   Abnormal results of cardiovascular function studies [[id number]]     Order Specific Question:   Pre-op Diagnosis     Answer:   SOB (shortness of breath) [807524]     Order Specific Question:   Pre-op Diagnosis     Answer:   Anginal equivalent [[id number]]      RTC  After C with poss pci

## 2022-09-27 DIAGNOSIS — I10 HYPERTENSION, UNSPECIFIED TYPE: ICD-10-CM

## 2022-09-27 DIAGNOSIS — Z01.812 PRE-OPERATIVE LABORATORY EXAMINATION: Primary | ICD-10-CM

## 2022-09-27 DIAGNOSIS — R94.30 ABNORMAL RESULTS OF CARDIOVASCULAR FUNCTION STUDIES: ICD-10-CM

## 2022-09-27 DIAGNOSIS — R07.89 CHEST DISCOMFORT: ICD-10-CM

## 2022-09-29 ENCOUNTER — HOSPITAL ENCOUNTER (OUTPATIENT)
Facility: HOSPITAL | Age: 62
Discharge: HOME | End: 2022-10-03
Attending: INTERNAL MEDICINE | Admitting: INTERNAL MEDICINE
Payer: MEDICARE

## 2022-09-29 DIAGNOSIS — R06.02 SOB (SHORTNESS OF BREATH): ICD-10-CM

## 2022-09-29 DIAGNOSIS — Z82.49 FAMILY HISTORY OF PREMATURE CORONARY ARTERY DISEASE: Chronic | ICD-10-CM

## 2022-09-29 DIAGNOSIS — R07.89 CHEST DISCOMFORT: ICD-10-CM

## 2022-09-29 DIAGNOSIS — I20.89 ANGINAL EQUIVALENT: ICD-10-CM

## 2022-09-29 DIAGNOSIS — R94.30 ABNORMAL RESULTS OF CARDIOVASCULAR FUNCTION STUDIES: Primary | ICD-10-CM

## 2022-09-29 LAB — CATH EF QUANTITATIVE: 75 %

## 2022-09-29 PROCEDURE — 93458 L HRT ARTERY/VENTRICLE ANGIO: CPT | Performed by: INTERNAL MEDICINE

## 2022-09-29 PROCEDURE — 27201423 OPTIME MED/SURG SUP & DEVICES STERILE SUPPLY: Performed by: INTERNAL MEDICINE

## 2022-09-29 PROCEDURE — C1760 CLOSURE DEV, VASC: HCPCS | Performed by: INTERNAL MEDICINE

## 2022-09-29 PROCEDURE — 93458 L HRT ARTERY/VENTRICLE ANGIO: CPT | Mod: 26,,, | Performed by: INTERNAL MEDICINE

## 2022-09-29 PROCEDURE — 25000003 PHARM REV CODE 250: Performed by: INTERNAL MEDICINE

## 2022-09-29 PROCEDURE — 11000001 HC ACUTE MED/SURG PRIVATE ROOM

## 2022-09-29 PROCEDURE — 25500020 PHARM REV CODE 255: Performed by: INTERNAL MEDICINE

## 2022-09-29 PROCEDURE — 25000003 PHARM REV CODE 250: Performed by: STUDENT IN AN ORGANIZED HEALTH CARE EDUCATION/TRAINING PROGRAM

## 2022-09-29 PROCEDURE — 93458 PR CATH PLACE/CORON ANGIO, IMG SUPER/INTERP,W LEFT HEART VENTRICULOGRAPHY: ICD-10-PCS | Mod: 26,,, | Performed by: INTERNAL MEDICINE

## 2022-09-29 PROCEDURE — 25000003 PHARM REV CODE 250: Performed by: NURSE PRACTITIONER

## 2022-09-29 PROCEDURE — 63600175 PHARM REV CODE 636 W HCPCS: Performed by: INTERNAL MEDICINE

## 2022-09-29 PROCEDURE — 99152 MOD SED SAME PHYS/QHP 5/>YRS: CPT | Performed by: INTERNAL MEDICINE

## 2022-09-29 PROCEDURE — C1894 INTRO/SHEATH, NON-LASER: HCPCS | Performed by: INTERNAL MEDICINE

## 2022-09-29 DEVICE — KIT ANGIO SEAL 6FR VIP: Type: IMPLANTABLE DEVICE | Site: LEG | Status: FUNCTIONAL

## 2022-09-29 RX ORDER — EZETIMIBE 10 MG/1
10 TABLET ORAL NIGHTLY
Status: DISCONTINUED | OUTPATIENT
Start: 2022-09-29 | End: 2022-10-03 | Stop reason: HOSPADM

## 2022-09-29 RX ORDER — DIAZEPAM 5 MG/1
5 TABLET ORAL ONCE
Status: COMPLETED | OUTPATIENT
Start: 2022-09-29 | End: 2022-09-29

## 2022-09-29 RX ORDER — VALACYCLOVIR HYDROCHLORIDE 500 MG/1
2000 TABLET, FILM COATED ORAL 2 TIMES DAILY
Status: DISCONTINUED | OUTPATIENT
Start: 2022-09-29 | End: 2022-10-03 | Stop reason: HOSPADM

## 2022-09-29 RX ORDER — AMLODIPINE BESYLATE 2.5 MG/1
2.5 TABLET ORAL DAILY
Status: DISCONTINUED | OUTPATIENT
Start: 2022-09-30 | End: 2022-10-03 | Stop reason: HOSPADM

## 2022-09-29 RX ORDER — LISINOPRIL 20 MG/1
20 TABLET ORAL DAILY
Status: DISCONTINUED | OUTPATIENT
Start: 2022-09-30 | End: 2022-10-03 | Stop reason: HOSPADM

## 2022-09-29 RX ORDER — FENTANYL CITRATE 50 UG/ML
INJECTION, SOLUTION INTRAMUSCULAR; INTRAVENOUS
Status: DISCONTINUED | OUTPATIENT
Start: 2022-09-29 | End: 2022-09-29 | Stop reason: HOSPADM

## 2022-09-29 RX ORDER — MIDAZOLAM HYDROCHLORIDE 1 MG/ML
INJECTION INTRAMUSCULAR; INTRAVENOUS
Status: DISCONTINUED | OUTPATIENT
Start: 2022-09-29 | End: 2022-09-29 | Stop reason: HOSPADM

## 2022-09-29 RX ORDER — OXYCODONE AND ACETAMINOPHEN 10; 325 MG/1; MG/1
1 TABLET ORAL EVERY 4 HOURS PRN
Qty: 2 TABLET | Refills: 0 | Status: SHIPPED | OUTPATIENT
Start: 2022-09-29 | End: 2023-08-21

## 2022-09-29 RX ORDER — OXYCODONE AND ACETAMINOPHEN 10; 325 MG/1; MG/1
1 TABLET ORAL EVERY 12 HOURS PRN
Status: DISCONTINUED | OUTPATIENT
Start: 2022-09-29 | End: 2022-10-02

## 2022-09-29 RX ORDER — HYDROXYZINE PAMOATE 25 MG/1
50 CAPSULE ORAL EVERY 8 HOURS PRN
Status: DISCONTINUED | OUTPATIENT
Start: 2022-09-29 | End: 2022-10-03 | Stop reason: HOSPADM

## 2022-09-29 RX ORDER — DIPHENHYDRAMINE HCL 25 MG
50 CAPSULE ORAL
Status: DISCONTINUED | OUTPATIENT
Start: 2022-09-29 | End: 2022-09-29 | Stop reason: HOSPADM

## 2022-09-29 RX ORDER — SODIUM CHLORIDE 450 MG/100ML
INJECTION, SOLUTION INTRAVENOUS CONTINUOUS
Status: DISCONTINUED | OUTPATIENT
Start: 2022-09-29 | End: 2022-10-03 | Stop reason: HOSPADM

## 2022-09-29 RX ORDER — VARENICLINE TARTRATE 0.5 MG/1
1 TABLET, FILM COATED ORAL 2 TIMES DAILY
Status: DISCONTINUED | OUTPATIENT
Start: 2022-09-29 | End: 2022-10-03 | Stop reason: HOSPADM

## 2022-09-29 RX ORDER — TIZANIDINE 4 MG/1
4 TABLET ORAL 2 TIMES DAILY
Status: DISCONTINUED | OUTPATIENT
Start: 2022-09-29 | End: 2022-10-03 | Stop reason: HOSPADM

## 2022-09-29 RX ORDER — ACETAMINOPHEN 325 MG/1
650 TABLET ORAL EVERY 4 HOURS PRN
Status: DISCONTINUED | OUTPATIENT
Start: 2022-09-29 | End: 2022-10-03 | Stop reason: HOSPADM

## 2022-09-29 RX ORDER — ONDANSETRON 4 MG/1
8 TABLET, ORALLY DISINTEGRATING ORAL EVERY 8 HOURS PRN
Status: DISCONTINUED | OUTPATIENT
Start: 2022-09-29 | End: 2022-10-03 | Stop reason: HOSPADM

## 2022-09-29 RX ORDER — BUPROPION HYDROCHLORIDE 100 MG/1
100 TABLET, EXTENDED RELEASE ORAL 2 TIMES DAILY
Status: DISCONTINUED | OUTPATIENT
Start: 2022-09-29 | End: 2022-10-03 | Stop reason: HOSPADM

## 2022-09-29 RX ORDER — MORPHINE SULFATE 10 MG/ML
INJECTION INTRAMUSCULAR; INTRAVENOUS; SUBCUTANEOUS
Status: DISCONTINUED | OUTPATIENT
Start: 2022-09-29 | End: 2022-09-29 | Stop reason: HOSPADM

## 2022-09-29 RX ORDER — SODIUM CHLORIDE 450 MG/100ML
INJECTION, SOLUTION INTRAVENOUS
Status: DISCONTINUED | OUTPATIENT
Start: 2022-09-29 | End: 2022-10-03 | Stop reason: HOSPADM

## 2022-09-29 RX ORDER — LIDOCAINE HYDROCHLORIDE 10 MG/ML
INJECTION INFILTRATION; PERINEURAL
Status: DISCONTINUED | OUTPATIENT
Start: 2022-09-29 | End: 2022-09-29 | Stop reason: HOSPADM

## 2022-09-29 RX ADMIN — OXYCODONE AND ACETAMINOPHEN 1 TABLET: 10; 325 TABLET ORAL at 09:09

## 2022-09-29 RX ADMIN — DIAZEPAM 5 MG: 5 TABLET ORAL at 12:09

## 2022-09-29 RX ADMIN — DIPHENHYDRAMINE HYDROCHLORIDE 50 MG: 25 CAPSULE ORAL at 12:09

## 2022-09-29 NOTE — Clinical Note
The right groin was prepped. The site was prepped with ChloraPrep. The patient was draped. The patient was positioned supine.

## 2022-09-29 NOTE — Clinical Note
The left ventricle was injected. The injected rate was 14 mL/sec. The total injected volume was 45 mL.
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
## 2022-09-29 NOTE — Clinical Note
100 ml of contrast were injected throughout the case. 100 mL of contrast was the total wasted during the case. 200 mL was the total amount used during the case.

## 2022-09-29 NOTE — Clinical Note
Physician in to assess leg: post procedure restrictions explained to pt per Physician niraj post procedure expectations.

## 2022-09-29 NOTE — Clinical Note
Pt agreeable to stent if necessary. States Physician has spoken to her about it and she is agreeable to whatever is necessary.

## 2022-09-30 PROCEDURE — 25000003 PHARM REV CODE 250: Performed by: INTERNAL MEDICINE

## 2022-09-30 PROCEDURE — 25000003 PHARM REV CODE 250: Performed by: STUDENT IN AN ORGANIZED HEALTH CARE EDUCATION/TRAINING PROGRAM

## 2022-09-30 PROCEDURE — 94761 N-INVAS EAR/PLS OXIMETRY MLT: CPT

## 2022-09-30 RX ADMIN — OXYCODONE AND ACETAMINOPHEN 1 TABLET: 10; 325 TABLET ORAL at 09:09

## 2022-09-30 RX ADMIN — EZETIMIBE 10 MG: 10 TABLET ORAL at 09:09

## 2022-09-30 RX ADMIN — VALACYCLOVIR HYDROCHLORIDE 2000 MG: 500 TABLET, FILM COATED ORAL at 08:09

## 2022-09-30 RX ADMIN — HYDROXYZINE PAMOATE 50 MG: 25 CAPSULE ORAL at 03:09

## 2022-09-30 RX ADMIN — ACETAMINOPHEN 650 MG: 325 TABLET ORAL at 03:09

## 2022-09-30 RX ADMIN — TIZANIDINE 4 MG: 4 TABLET ORAL at 09:09

## 2022-09-30 RX ADMIN — AMLODIPINE BESYLATE 2.5 MG: 2.5 TABLET ORAL at 08:09

## 2022-09-30 RX ADMIN — LISINOPRIL 20 MG: 20 TABLET ORAL at 08:09

## 2022-09-30 RX ADMIN — BUPROPION HYDROCHLORIDE 100 MG: 100 TABLET, FILM COATED, EXTENDED RELEASE ORAL at 08:09

## 2022-09-30 NOTE — PLAN OF CARE
Spoke with Teresa Taylor @ [facility] 0964 Re faxed the [facility] form as it seemed to not be delivered. [facility] will reach out to the patient. Pt given forms of [facility] verification form for her files. Pt is aware she will need to follow up on this as well as she is close on the wait list for [facility] housing at [facility].

## 2022-09-30 NOTE — NURSING
"Pt states that she self administered all nightly home meds except her "pain pill because she forgot to bring it" et states that she takes Percocet 10mg at home for pain.    Pt's med bag is on bedside table.  "

## 2022-09-30 NOTE — PLAN OF CARE
Problem: Adult Inpatient Plan of Care  Goal: Plan of Care Review  Outcome: Ongoing, Progressing  Goal: Patient-Specific Goal (Individualized)  Outcome: Ongoing, Progressing  Goal: Absence of Hospital-Acquired Illness or Injury  Outcome: Ongoing, Progressing  Goal: Optimal Comfort and Wellbeing  Outcome: Ongoing, Progressing  Goal: Readiness for Transition of Care  Outcome: Ongoing, Progressing     Problem: Arrhythmia/Dysrhythmia (Cardiac Catheterization)  Goal: Stable Heart Rate and Rhythm  Outcome: Ongoing, Progressing     Problem: Bleeding (Cardiac Catheterization)  Goal: Absence of Bleeding  Outcome: Ongoing, Progressing     Problem: Contrast-Induced Injury Risk (Cardiac Catheterization)  Goal: Absence of Contrast-Induced Injury  Outcome: Ongoing, Progressing

## 2022-10-01 PROCEDURE — 94761 N-INVAS EAR/PLS OXIMETRY MLT: CPT

## 2022-10-01 PROCEDURE — 25000003 PHARM REV CODE 250: Performed by: INTERNAL MEDICINE

## 2022-10-01 PROCEDURE — 25000003 PHARM REV CODE 250: Performed by: STUDENT IN AN ORGANIZED HEALTH CARE EDUCATION/TRAINING PROGRAM

## 2022-10-01 RX ADMIN — AMLODIPINE BESYLATE 2.5 MG: 2.5 TABLET ORAL at 09:10

## 2022-10-01 RX ADMIN — BUPROPION HYDROCHLORIDE 100 MG: 100 TABLET, FILM COATED, EXTENDED RELEASE ORAL at 09:10

## 2022-10-01 RX ADMIN — EZETIMIBE 10 MG: 10 TABLET ORAL at 08:10

## 2022-10-01 RX ADMIN — SODIUM CHLORIDE: 4.5 INJECTION, SOLUTION INTRAVENOUS at 08:10

## 2022-10-01 RX ADMIN — HYDROXYZINE PAMOATE 50 MG: 25 CAPSULE ORAL at 08:10

## 2022-10-01 RX ADMIN — OXYCODONE AND ACETAMINOPHEN 1 TABLET: 10; 325 TABLET ORAL at 09:10

## 2022-10-01 RX ADMIN — OXYCODONE AND ACETAMINOPHEN 1 TABLET: 10; 325 TABLET ORAL at 08:10

## 2022-10-02 PROBLEM — L03.90 CELLULITIS: Status: ACTIVE | Noted: 2022-10-02

## 2022-10-02 PROBLEM — L40.9 PSORIASIS: Status: ACTIVE | Noted: 2022-10-02

## 2022-10-02 PROCEDURE — 25000003 PHARM REV CODE 250: Performed by: INTERNAL MEDICINE

## 2022-10-02 PROCEDURE — 25000003 PHARM REV CODE 250: Performed by: STUDENT IN AN ORGANIZED HEALTH CARE EDUCATION/TRAINING PROGRAM

## 2022-10-02 PROCEDURE — 99220 PR INITIAL OBSERVATION CARE,LEVL III: CPT | Mod: ,,, | Performed by: HOSPITALIST

## 2022-10-02 PROCEDURE — 25000003 PHARM REV CODE 250: Performed by: HOSPITALIST

## 2022-10-02 PROCEDURE — 94761 N-INVAS EAR/PLS OXIMETRY MLT: CPT

## 2022-10-02 PROCEDURE — 63600175 PHARM REV CODE 636 W HCPCS: Performed by: HOSPITALIST

## 2022-10-02 PROCEDURE — 99220 PR INITIAL OBSERVATION CARE,LEVL III: ICD-10-PCS | Mod: ,,, | Performed by: HOSPITALIST

## 2022-10-02 RX ORDER — CEPHALEXIN 250 MG/1
500 CAPSULE ORAL EVERY 6 HOURS
Status: DISCONTINUED | OUTPATIENT
Start: 2022-10-02 | End: 2022-10-03 | Stop reason: HOSPADM

## 2022-10-02 RX ORDER — OXYCODONE AND ACETAMINOPHEN 10; 325 MG/1; MG/1
1 TABLET ORAL EVERY 6 HOURS PRN
Status: DISCONTINUED | OUTPATIENT
Start: 2022-10-02 | End: 2022-10-03 | Stop reason: HOSPADM

## 2022-10-02 RX ORDER — PREDNISONE 20 MG/1
40 TABLET ORAL DAILY
Qty: 14 TABLET | Refills: 0 | Status: SHIPPED | OUTPATIENT
Start: 2022-10-02 | End: 2022-10-09

## 2022-10-02 RX ORDER — CEPHALEXIN 500 MG/1
500 CAPSULE ORAL EVERY 6 HOURS
Qty: 40 CAPSULE | Refills: 0 | Status: SHIPPED | OUTPATIENT
Start: 2022-10-02 | End: 2022-10-12

## 2022-10-02 RX ADMIN — OXYCODONE AND ACETAMINOPHEN 1 TABLET: 10; 325 TABLET ORAL at 04:10

## 2022-10-02 RX ADMIN — TIZANIDINE 4 MG: 4 TABLET ORAL at 10:10

## 2022-10-02 RX ADMIN — CEPHALEXIN 500 MG: 250 CAPSULE ORAL at 04:10

## 2022-10-02 RX ADMIN — OXYCODONE AND ACETAMINOPHEN 1 TABLET: 10; 325 TABLET ORAL at 10:10

## 2022-10-02 RX ADMIN — AMLODIPINE BESYLATE 2.5 MG: 2.5 TABLET ORAL at 08:10

## 2022-10-02 RX ADMIN — OXYCODONE AND ACETAMINOPHEN 1 TABLET: 10; 325 TABLET ORAL at 08:10

## 2022-10-02 RX ADMIN — PREDNISONE 50 MG: 20 TABLET ORAL at 04:10

## 2022-10-02 RX ADMIN — EZETIMIBE 10 MG: 10 TABLET ORAL at 10:10

## 2022-10-02 RX ADMIN — SODIUM CHLORIDE: 4.5 INJECTION, SOLUTION INTRAVENOUS at 08:10

## 2022-10-02 RX ADMIN — BUPROPION HYDROCHLORIDE 100 MG: 100 TABLET, FILM COATED, EXTENDED RELEASE ORAL at 08:10

## 2022-10-02 RX ADMIN — ACETAMINOPHEN 650 MG: 325 TABLET ORAL at 08:10

## 2022-10-02 NOTE — DISCHARGE SUMMARY
[facility] - [facility] Tele[facility] Hospital Medicine  Discharge Summary      Patient Name: Janene Castillo  MRN: [id number]  Patient Class: OP- Outpatient  Admission Date: 9/29/2022  Hospital Length of Stay: 0 days  Discharge Date and Time:  10/02/2022 4:34 PM  Attending Physician: Jason Razo MD   Discharging Provider: Anusha Wagner MD  Primary Care Provider: Asael Huizar MD      HPI:   Ms. Castillo is a pleasant 62-year-old woman history of GERD, CKD, anxiety, hypertension, hyperlipidemia, psoriasis and other skin disorders, family history of coronary artery disease presented for chest pain and shortness of breath.  She had an abnormal stress test and is now status post left heart catheterization.  The coronaries were normal left heart catheterization.  Patient was to be discharged but she was unable to go home as she did not have a ride plus she was very short of breath.  She currently states that she feels better.  However, she has severe pain and itching in her lower extremities as related to her skin condition.  There is a history of psoriasis, prurigo nodularis, and contact/allergic dermatitis.  She is followed as outpatient by Tatum Perry for dermatology care.      Procedure(s) (LRB):  Angiogram, Coronary, with Left Heart Cath (N/A)  Percutaneous coronary intervention (N/A)      Hospital Course:   10/2:  Case was discussed with Cardiology.  She is stable for discharge as her coronary arteries are clean.    She will follow up closely with dermatology about her skin lesions.  Follow up with her PCP as well.       Goals of Care Treatment Preferences:  Code Status: Full Code      Consults:   Consults (From admission, onward)          Status Ordering Provider     IP consult to case management  Once        Provider:  (Not yet assigned)    Completed JASON RAZO            * Chest discomfort  Patient with chest pain and an abnormal stress test.    She status post left heart  catheterization with normal coronaries.    Follow-up with Dr. Ratliff in clinic.    Clinic follow-up with Dr. Ratliff for tomorrow at 10:00 a.m..  This was discussed with patient.      Cellulitis  Patient with bilateral lower extremity cellulitis with erythema, warmth, tenderness around multiple wounds.    This is an acute on chronic problem.  She has been seen by Dermatology with antibiotics in the past for this.    Plan to follow-up with dermatology in the next 7-14 days.    Discharge with Keflex and steroid taper.      Psoriasis  Long history of psoriasis and psoriatic arthritis.  Prescribed steroid taper for this acute exacerbation.    Patient states she has pain when walking and pain even when at rest.    She is on pain medications for this and she already has pain medicines and will continue them at home.  Follow with dermatology.      SOB (shortness of breath)  Etiology shortness of breath unclear.  Recommend follow-up PCP for further evaluation.    Patient states that shortness of breath has improved while she has been hospitalized.      Hyperlipidemia  Patient with an allergy to statin.  Continue home cholesterol medications.      Hypertension  Continue home antihypertensives.    Follow-up with PCP within 1 week.        Final Active Diagnoses:    Diagnosis Date Noted POA    PRINCIPAL PROBLEM:  Chest discomfort [R07.89] 08/30/2022 Yes    Psoriasis [L40.9] 10/02/2022 Yes    Cellulitis [L03.90] 10/02/2022 Yes    SOB (shortness of breath) [R06.02] 08/30/2022 Yes    Hyperlipidemia [E78.5] 05/27/2021 Yes    Hypertension [I10] 05/27/2021 Yes      Problems Resolved During this Admission:       Discharged Condition: fair    Disposition: Home or Self Care    Follow Up:   Follow-up Information       Jason Ratliff MD Follow up on 11/3/2022.    Specialties: Interventional Cardiology, Cardiology  Why: S/P [facility] APPT TIME 10:00 am  Contact information:  [address] [facility] [address]  [phone]                Asael Huizar MD Follow up in 1 week(s).    Specialty: Family Medicine  Contact information:  [address]  [facility] [place] [id number] [phone]                           Patient Instructions:   No discharge procedures on file.    Significant Diagnostic Studies: Labs: BMP: No results for input(s): GLU, NA, K, CL, CO2, BUN, CREATININE, CALCIUM, MG in the last 48 hours. and CBC No results for input(s): WBC, HGB, HCT, PLT in the last 48 hours.  Cardiac Graphics: Echocardiogram: 2D echo with color flow doppler: No results found for this or any previous visit.    Pending Diagnostic Studies:       None           Medications:  Reconciled Home Medications:      Medication List        START taking these medications      cephALEXin 500 MG capsule  Commonly known as: KEFLEX  Take 1 capsule (500 mg total) by mouth every 6 (six) hours. for 10 days     predniSONE 20 MG tablet  Commonly known as: DELTASONE  Take 2 tablets (40 mg total) by mouth once daily. for 7 days            CHANGE how you take these medications      * oxyCODONE-acetaminophen  mg per tablet  Commonly known as: PERCOCET  Take 1 tablet by mouth every 12 (twelve) hours as needed.  What changed: Another medication with the same name was added. Make sure you understand how and when to take each.     * oxyCODONE-acetaminophen  mg per tablet  Commonly known as: PERCOCET  Take 1 tablet by mouth every 4 (four) hours as needed for Pain.  What changed: You were already taking a medication with the same name, and this prescription was added. Make sure you understand how and when to take each.           * This list has 2 medication(s) that are the same as other medications prescribed for you. Read the directions carefully, and ask your doctor or other care provider to review them with you.                CONTINUE taking these medications      amLODIPine 2.5 MG tablet  Commonly known as: NORVASC  Take 1 tablet  by mouth once daily     benazepriL 20 MG tablet  Commonly known as: LOTENSIN  Take 1 tablet by mouth once daily     buPROPion 100 MG tablet  Commonly known as: WELLBUTRIN  Take 1 tablet (100 mg total) by mouth 2 (two) times daily.     calcipotriene-betamethasone ointment  Commonly known as: TACLONEX  Apply to AA on body BID, tapering with improvement     ezetimibe 10 mg tablet  Commonly known as: ZETIA  TAKE 1 TABLET BY MOUTH ONCE DAILY IN THE EVENING     hydrOXYzine pamoate 25 MG Cap  Commonly known as: VISTARIL  TAKE 1 TO 2 CAPSULES BY MOUTH EVERY 8 HOURS AS NEEDED     mupirocin 2 % ointment  Commonly known as: BACTROBAN  APPLY  OINTMENT TOPICALLY THREE TIMES DAILY     tiZANidine 4 MG tablet  Commonly known as: ZANAFLEX  Take 1 tablet by mouth twice daily     varenicline 1 mg Tab  Commonly known as: CHANTIX  Take 1 tablet by mouth twice daily            STOP taking these medications      SKYRIZI 150 mg/mL Pnij  Generic drug: risankizumab-rzaa     valACYclovir 1000 MG tablet  Commonly known as: VALTREX              Indwelling Lines/Drains at time of discharge:   Lines/Drains/Airways       None                   Time spent on the discharge of patient: >30 minutes         Anusha Wagner MD  Department of Hospital Medicine  [facility] - [facility] Tele[facility]

## 2022-10-02 NOTE — PLAN OF CARE
Problem: Adult Inpatient Plan of Care  Goal: Plan of Care Review  Outcome: Ongoing, Progressing  Goal: Patient-Specific Goal (Individualized)  Outcome: Ongoing, Progressing  Goal: Absence of Hospital-Acquired Illness or Injury  Outcome: Ongoing, Progressing  Goal: Optimal Comfort and Wellbeing  Outcome: Ongoing, Progressing  Goal: Readiness for Transition of Care  Outcome: Ongoing, Progressing     Problem: Bleeding (Cardiac Catheterization)  Goal: Absence of Bleeding  Outcome: Ongoing, Progressing     Problem: Embolism (Cardiac Catheterization)  Goal: Absence of Embolism Signs and Symptoms  Outcome: Ongoing, Progressing     Problem: Pain (Cardiac Catheterization)  Goal: Acceptable Pain Control  Outcome: Ongoing, Progressing

## 2022-10-02 NOTE — ASSESSMENT & PLAN NOTE
Patient with bilateral lower extremity cellulitis with erythema, warmth, tenderness around multiple wounds.    This is an acute on chronic problem.  She has been seen by Dermatology with antibiotics in the past for this.    Plan to follow-up with dermatology in the next 7-14 days.    Discharge with Keflex and steroid taper.

## 2022-10-02 NOTE — NURSING
Patient lying in bed awake watching tv. Resp even and unlabored. C/O pain to beronica. Legs 9/10. Safety measures ongoing.

## 2022-10-02 NOTE — CONSULTS
[facility] Hospital Medicine  Consult Note    Patient Name: Janene Castillo  MRN: [id number]  Admission Date: 2022  Hospital Length of Stay: 0 days  Attending Physician: Jason Ratliff MD   Primary Care Provider: Asael Huizar MD           Patient information was obtained from patient, past medical records and ER records.     Consults  Subjective:     Principal Problem: Chest discomfort    Chief Complaint: No chief complaint on file.       HPI: Ms. Castillo is a pleasant 62-year-old woman history of GERD, CKD, anxiety, hypertension, hyperlipidemia, psoriasis and other skin disorders, family history of coronary artery disease presented for chest pain and shortness of breath.  She had an abnormal stress test and is now status post left heart catheterization.  The coronaries were normal left heart catheterization.  Patient was to be discharged but she was unable to go home as she did not have a ride plus she was very short of breath.  She currently states that she feels better.  However, she has severe pain and itching in her lower extremities as related to her skin condition.  There is a history of psoriasis, prurigo nodularis, and contact/allergic dermatitis.  She is followed as outpatient by Tatum Perry for dermatology care.      Past Medical History:   Diagnosis Date    Allergic contact dermatitis     balsam of peru, bacitracin, fragrance, methyldibromo glutaronite    Anxiety     Chronic pain     HTN (hypertension)     Hyperlipidemia     Plaque psoriasis     Prurigo nodularis     Psoriatic arthritis     Stage 3b chronic kidney disease        Past Surgical History:   Procedure Laterality Date    ANGIOGRAM, CORONARY, WITH LEFT HEART CATHETERIZATION N/A 2022    Procedure: Angiogram, Coronary, with Left Heart Cath;  Surgeon: Jason Ratliff MD;  Location: [facility] CATH LAB;  Service: Cardiology;  Laterality: N/A;     SECTION         Review of patient's  allergies indicates:   Allergen Reactions    Bacitracin Dermatitis    Codeine sulfate     Codeine Rash     Rash     Methyldibromoglutaronitrile Dermatitis    Permethrin Rash     Rash     Sulfa (sulfonamide antibiotics) Other (See Comments) and Rash     unknown       No current facility-administered medications on file prior to encounter.     Current Outpatient Medications on File Prior to Encounter   Medication Sig    amLODIPine (NORVASC) 2.5 MG tablet Take 1 tablet by mouth once daily    benazepriL (LOTENSIN) 20 MG tablet Take 1 tablet by mouth once daily    buPROPion (WELLBUTRIN) 100 MG tablet Take 1 tablet (100 mg total) by mouth 2 (two) times daily.    calcipotriene-betamethasone (TACLONEX) ointment Apply to AA on body BID, tapering with improvement    ezetimibe (ZETIA) 10 mg tablet TAKE 1 TABLET BY MOUTH ONCE DAILY IN THE EVENING    hydrOXYzine pamoate (VISTARIL) 25 MG Cap TAKE 1 TO 2 CAPSULES BY MOUTH EVERY 8 HOURS AS NEEDED    oxyCODONE-acetaminophen (PERCOCET)  mg per tablet Take 1 tablet by mouth every 12 (twelve) hours as needed.    [DISCONTINUED] risankizumab-rzaa (SKYRIZI) 150 mg/mL PnIj inject 150mg/ml at week 0, 4 and then every 12 weeks thereafter     Family History       Problem Relation (Age of Onset)    Heart attack Father    Heart disease Father    Heart failure Father    Hypertension Mother    Pacemaker/defibrilator Father          Tobacco Use    Smoking status: Former    Smokeless tobacco: Never   Substance and Sexual Activity    Alcohol use: Not Currently    Drug use: Not on file    Sexual activity: Not on file     Review of Systems   Constitutional:  Negative for activity change, chills, fatigue and fever.   HENT:  Negative for congestion and sore throat.    Respiratory:  Negative for chest tightness.    Gastrointestinal:  Negative for abdominal distention, abdominal pain and diarrhea.   Endocrine: Negative for cold intolerance and heat intolerance.   Genitourinary:  Negative for  dysuria.   Musculoskeletal:  Negative for arthralgias and back pain.   Skin:  Positive for rash and wound. Negative for color change.   Neurological:  Negative for dizziness, tremors and headaches.   Hematological:  Bruises/bleeds easily.   Psychiatric/Behavioral:  Negative for agitation. The patient is not nervous/anxious.    Objective:     Vital Signs (Most Recent):  Temp: 98.7 °F (37.1 °C) (10/02/22 1116)  Pulse: 88 (10/02/22 1116)  Resp: 18 (10/02/22 1116)  BP: 115/77 (10/02/22 1116)  SpO2: 98 % (10/02/22 1116) Vital Signs (24h Range):  Temp:  [96.6 °F (35.9 °C)-98.7 °F (37.1 °C)] 98.7 °F (37.1 °C)  Pulse:  [67-91] 88  Resp:  [16-20] 18  SpO2:  [97 %-100 %] 98 %  BP: (101-142)/(56-93) 115/77     Weight: 86.7 kg (191 lb 2.2 oz)  Body mass index is 29.94 kg/m².    Physical Exam  Constitutional:       Appearance: Normal appearance.   HENT:      Head: Normocephalic and atraumatic.      Nose: Nose normal.      Mouth/Throat:      Mouth: Mucous membranes are moist.      Pharynx: Oropharynx is clear.   Eyes:      Extraocular Movements: Extraocular movements intact.      Conjunctiva/sclera: Conjunctivae normal.      Pupils: Pupils are equal, round, and reactive to light.   Cardiovascular:      Rate and Rhythm: Normal rate and regular rhythm.      Pulses: Normal pulses.      Heart sounds: Normal heart sounds.   Pulmonary:      Effort: Pulmonary effort is normal.      Breath sounds: Normal breath sounds.   Abdominal:      General: Abdomen is flat. Bowel sounds are normal.      Palpations: Abdomen is soft.   Musculoskeletal:         General: Normal range of motion.      Cervical back: Normal range of motion and neck supple.   Skin:     General: Skin is warm and dry.      Findings: No bruising.      Comments: Bilateral lower extremity scaly lesions as well as skin abrasions.   Neurological:      General: No focal deficit present.      Mental Status: She is alert and oriented to person, place, and time.   Psychiatric:          Mood and Affect: Mood normal.         Behavior: Behavior normal.       Significant Labs: All pertinent labs within the past 24 hours have been reviewed.    Significant Imaging: I have reviewed all pertinent imaging results/findings within the past 24 hours.    Assessment/Plan:     * Chest discomfort  Patient with chest pain and an abnormal stress test.    She status post left heart catheterization with normal coronaries.    Follow-up with Dr. Ratliff in clinic.      Cellulitis  Patient with bilateral lower extremity cellulitis with erythema, warmth, tenderness around multiple wounds.    This is an acute on chronic problem.  She has been seen by Dermatology with antibiotics in the past for this.    Plan to follow-up with dermatology in the next 7-14 days.    Discharge with Keflex and steroid taper.      Psoriasis  Long history of psoriasis and psoriatic arthritis.  Prescribed steroid taper for this acute exacerbation.    Patient states she has pain when walking and pain even when at rest.    She is on pain medications for this and she already has pain medicines and will continue them at home.  Follow with dermatology.      SOB (shortness of breath)  Etiology shortness of breath unclear.  Recommend follow-up PCP for further evaluation.    Patient states that shortness of breath has improved while she has been hospitalized.      Hyperlipidemia  Patient with an allergy to statin.  Continue home cholesterol medications.      Hypertension  Continue home antihypertensives.    Follow-up with PCP within 1 week.        VTE Risk Mitigation (From admission, onward)      None                Thank you for your consult. I will follow-up with patient. Please contact us if you have any additional questions.    Anusha Wagner MD  Department of Hospital Medicine   [facility] - [address] [place] [address]

## 2022-10-02 NOTE — SUBJECTIVE & OBJECTIVE
Past Medical History:   Diagnosis Date    Allergic contact dermatitis     balsam of peru, bacitracin, fragrance, methyldibromo glutaronite    Anxiety     Chronic pain     HTN (hypertension)     Hyperlipidemia     Plaque psoriasis     Prurigo nodularis     Psoriatic arthritis     Stage 3b chronic kidney disease        Past Surgical History:   Procedure Laterality Date    ANGIOGRAM, CORONARY, WITH LEFT HEART CATHETERIZATION N/A 2022    Procedure: Angiogram, Coronary, with Left Heart Cath;  Surgeon: Jason Ratliff MD;  Location: [facility] CATH LAB;  Service: Cardiology;  Laterality: N/A;     SECTION         Review of patient's allergies indicates:   Allergen Reactions    Bacitracin Dermatitis    Codeine sulfate     Codeine Rash     Rash     Methyldibromoglutaronitrile Dermatitis    Permethrin Rash     Rash     Sulfa (sulfonamide antibiotics) Other (See Comments) and Rash     unknown       No current facility-administered medications on file prior to encounter.     Current Outpatient Medications on File Prior to Encounter   Medication Sig    amLODIPine (NORVASC) 2.5 MG tablet Take 1 tablet by mouth once daily    benazepriL (LOTENSIN) 20 MG tablet Take 1 tablet by mouth once daily    buPROPion (WELLBUTRIN) 100 MG tablet Take 1 tablet (100 mg total) by mouth 2 (two) times daily.    calcipotriene-betamethasone (TACLONEX) ointment Apply to AA on body BID, tapering with improvement    ezetimibe (ZETIA) 10 mg tablet TAKE 1 TABLET BY MOUTH ONCE DAILY IN THE EVENING    hydrOXYzine pamoate (VISTARIL) 25 MG Cap TAKE 1 TO 2 CAPSULES BY MOUTH EVERY 8 HOURS AS NEEDED    oxyCODONE-acetaminophen (PERCOCET)  mg per tablet Take 1 tablet by mouth every 12 (twelve) hours as needed.    [DISCONTINUED] risankizumab-rzaa (SKYRIZI) 150 mg/mL PnIj inject 150mg/ml at week 0, 4 and then every 12 weeks thereafter     Family History       Problem Relation (Age of Onset)    Heart attack Father    Heart disease Father    Heart  failure Father    Hypertension Mother    Pacemaker/defibrilator Father          Tobacco Use    Smoking status: Former    Smokeless tobacco: Never   Substance and Sexual Activity    Alcohol use: Not Currently    Drug use: Not on file    Sexual activity: Not on file     Review of Systems   Constitutional:  Negative for activity change, chills, fatigue and fever.   HENT:  Negative for congestion and sore throat.    Respiratory:  Negative for chest tightness.    Gastrointestinal:  Negative for abdominal distention, abdominal pain and diarrhea.   Endocrine: Negative for cold intolerance and heat intolerance.   Genitourinary:  Negative for dysuria.   Musculoskeletal:  Negative for arthralgias and back pain.   Skin:  Positive for rash and wound. Negative for color change.   Neurological:  Negative for dizziness, tremors and headaches.   Hematological:  Bruises/bleeds easily.   Psychiatric/Behavioral:  Negative for agitation. The patient is not nervous/anxious.    Objective:     Vital Signs (Most Recent):  Temp: 98.7 °F (37.1 °C) (10/02/22 1116)  Pulse: 88 (10/02/22 1116)  Resp: 18 (10/02/22 1116)  BP: 115/77 (10/02/22 1116)  SpO2: 98 % (10/02/22 1116) Vital Signs (24h Range):  Temp:  [96.6 °F (35.9 °C)-98.7 °F (37.1 °C)] 98.7 °F (37.1 °C)  Pulse:  [67-91] 88  Resp:  [16-20] 18  SpO2:  [97 %-100 %] 98 %  BP: (101-142)/(56-93) 115/77     Weight: 86.7 kg (191 lb 2.2 oz)  Body mass index is 29.94 kg/m².    Physical Exam  Constitutional:       Appearance: Normal appearance.   HENT:      Head: Normocephalic and atraumatic.      Nose: Nose normal.      Mouth/Throat:      Mouth: Mucous membranes are moist.      Pharynx: Oropharynx is clear.   Eyes:      Extraocular Movements: Extraocular movements intact.      Conjunctiva/sclera: Conjunctivae normal.      Pupils: Pupils are equal, round, and reactive to light.   Cardiovascular:      Rate and Rhythm: Normal rate and regular rhythm.      Pulses: Normal pulses.      Heart sounds:  Normal heart sounds.   Pulmonary:      Effort: Pulmonary effort is normal.      Breath sounds: Normal breath sounds.   Abdominal:      General: Abdomen is flat. Bowel sounds are normal.      Palpations: Abdomen is soft.   Musculoskeletal:         General: Normal range of motion.      Cervical back: Normal range of motion and neck supple.   Skin:     General: Skin is warm and dry.      Findings: No bruising.      Comments: Bilateral lower extremity scaly lesions as well as skin abrasions.   Neurological:      General: No focal deficit present.      Mental Status: She is alert and oriented to person, place, and time.   Psychiatric:         Mood and Affect: Mood normal.         Behavior: Behavior normal.       Significant Labs: All pertinent labs within the past 24 hours have been reviewed.    Significant Imaging: I have reviewed all pertinent imaging results/findings within the past 24 hours.

## 2022-10-02 NOTE — ASSESSMENT & PLAN NOTE
Patient with chest pain and an abnormal stress test.    She status post left heart catheterization with normal coronaries.    Follow-up with Dr. Ratliff in clinic.    Clinic follow-up with Dr. Ratliff for tomorrow at 10:00 a.m..  This was discussed with patient.    Results for orders placed during the hospital encounter of 09/29/22    Cardiac catheterization    Conclusion    The ejection fraction was calculated to be 75%.    There was hyperdynamic left ventricular systolic function.    The left ventricular end diastolic pressure was normal.    The pre-procedure left ventricular end diastolic pressure was 10.    The estimated blood loss was none.    The coronary arteries were normal..    The procedure log was documented by Documenter: Tianna Adams RN and verified by Jason Ratliff MD.    Date: 9/29/2022  Time: 3:31 PM    Results for orders placed during the hospital encounter of 08/31/22    Echo    Interpretation Summary  · The left ventricle is normal in size with eccentric hypertrophy and normal systolic function.  · The estimated ejection fraction is 70%.  · Normal left ventricular diastolic function.  · Mild right ventricular enlargement.  · Mild right atrial enlargement.  · Mild mitral regurgitation.  · Mild tricuspid regurgitation.  · Normal central venous pressure (3 mmHg).  · The estimated PA systolic pressure is 30 mmHg.  · Trivial pericardial effusion.

## 2022-10-02 NOTE — HPI
Ms. Castillo is a pleasant 8-year-old woman history of GERD, CKD, anxiety, hypertension, hyperlipidemia, psoriasis and other skin disorders, family history of coronary artery disease presented for chest pain and shortness of breath.  She had an abnormal stress test and is now status post left heart catheterization.  The coronaries were normal left heart catheterization.  Patient was to be discharged but she was unable to go home as she did not have a ride plus she was very short of breath.  She currently states that she feels better.  However, she has severe pain and itching in her lower extremities as related to her skin condition.  There is a history of psoriasis, prurigo nodularis, and contact/allergic dermatitis.  She is followed as outpatient by Tatum Perry for dermatology care.

## 2022-10-02 NOTE — ASSESSMENT & PLAN NOTE
Long history of psoriasis and psoriatic arthritis.  Prescribed steroid taper for this acute exacerbation.    Patient states she has pain when walking and pain even when at rest.    She is on pain medications for this and she already has pain medicines and will continue them at home.  Follow with dermatology.

## 2022-10-02 NOTE — HOSPITAL COURSE
10/2:  Case was discussed with Cardiology.  She is stable for discharge as her coronary arteries are clean.    She will follow up closely with dermatology about her skin lesions.  Follow up with her PCP as well.

## 2022-10-02 NOTE — ASSESSMENT & PLAN NOTE
Patient with chest pain and an abnormal stress test.    She status post left heart catheterization with normal coronaries.    Follow-up with Dr. Ratliff in clinic.

## 2022-10-02 NOTE — ASSESSMENT & PLAN NOTE
Etiology shortness of breath unclear.  Recommend follow-up PCP for further evaluation.    Patient states that shortness of breath has improved while she has been hospitalized.

## 2022-10-03 VITALS
OXYGEN SATURATION: 98 % | RESPIRATION RATE: 17 BRPM | HEIGHT: 67 IN | WEIGHT: 191.13 LBS | SYSTOLIC BLOOD PRESSURE: 115 MMHG | HEART RATE: 79 BPM | TEMPERATURE: 98 F | BODY MASS INDEX: 30 KG/M2 | DIASTOLIC BLOOD PRESSURE: 74 MMHG

## 2022-10-03 PROCEDURE — 25000003 PHARM REV CODE 250: Performed by: HOSPITALIST

## 2022-10-03 RX ADMIN — CEPHALEXIN 500 MG: 250 CAPSULE ORAL at 12:10

## 2022-10-03 RX ADMIN — CEPHALEXIN 500 MG: 250 CAPSULE ORAL at 05:10

## 2022-10-03 NOTE — NURSING
Late Entry: received pt and it was reported that pt was discharged on yesterday but didn't have a ride home and that her transportation would be here this morning

## 2022-10-03 NOTE — NURSING
Patient's transportation will not be able to pick her up until after 10am on 10/3/22. Informed house supervisor. Will give regular meds.

## 2022-10-03 NOTE — PLAN OF CARE
[facility] - [facility] Telemetry  Discharge Final Note    Primary Care Provider: Asael Huizar MD    Expected Discharge Date: 10/3/2022    Final Discharge Note (most recent)       Final Note - 10/03/22 1033          Final Note    Assessment Type Final Discharge Note     Anticipated Discharge Disposition Home or Self Care     What phone number can be called within the next 1-3 days to see how you are doing after discharge? [phone]        Post-Acute Status    Discharge Delays Personal Transportation                     Patient discharged today.     Important Message from Medicare             Contact Info       Jason Ratliff MD   Specialty: Interventional Cardiology, Cardiology    [address] [facility] [address]   Phone: [phone]       Next Steps: Follow up on 11/3/2022    Instructions: S/P C APPT TIME 10:00 am    Asael Huizar MD   Specialty: Family Medicine   Relationship: PCP - General    [address] [place] [address].  [facility] [address]   Phone: [phone]       Next Steps: Follow up in 1 week(s)

## 2022-10-13 ENCOUNTER — OFFICE VISIT (OUTPATIENT)
Dept: DERMATOLOGY | Facility: CLINIC | Age: 62
End: 2022-10-13
Payer: MEDICARE

## 2022-10-13 DIAGNOSIS — L23.5 ALLERGIC DERMATITIS DUE TO OTHER CHEMICAL PRODUCT: ICD-10-CM

## 2022-10-13 DIAGNOSIS — L20.84 INTRINSIC ECZEMA: Primary | ICD-10-CM

## 2022-10-13 DIAGNOSIS — L28.1 PRURIGO NODULARIS: ICD-10-CM

## 2022-10-13 PROCEDURE — 1160F PR REVIEW ALL MEDS BY PRESCRIBER/CLIN PHARMACIST DOCUMENTED: ICD-10-PCS | Mod: CPTII,,, | Performed by: DERMATOLOGY

## 2022-10-13 PROCEDURE — 4010F ACE/ARB THERAPY RXD/TAKEN: CPT | Mod: CPTII,,, | Performed by: DERMATOLOGY

## 2022-10-13 PROCEDURE — 1159F PR MEDICATION LIST DOCUMENTED IN MEDICAL RECORD: ICD-10-PCS | Mod: CPTII,,, | Performed by: DERMATOLOGY

## 2022-10-13 PROCEDURE — 99214 PR OFFICE/OUTPT VISIT, EST, LEVL IV, 30-39 MIN: ICD-10-PCS | Mod: ,,, | Performed by: DERMATOLOGY

## 2022-10-13 PROCEDURE — 1159F MED LIST DOCD IN RCRD: CPT | Mod: CPTII,,, | Performed by: DERMATOLOGY

## 2022-10-13 PROCEDURE — 99214 OFFICE O/P EST MOD 30 MIN: CPT | Mod: ,,, | Performed by: DERMATOLOGY

## 2022-10-13 PROCEDURE — 1160F RVW MEDS BY RX/DR IN RCRD: CPT | Mod: CPTII,,, | Performed by: DERMATOLOGY

## 2022-10-13 PROCEDURE — 4010F PR ACE/ARB THEARPY RXD/TAKEN: ICD-10-PCS | Mod: CPTII,,, | Performed by: DERMATOLOGY

## 2022-10-13 RX ORDER — RISANKIZUMAB-RZAA 150 MG/ML
INJECTION SUBCUTANEOUS
COMMUNITY
Start: 2022-08-17 | End: 2022-11-29

## 2022-10-13 NOTE — PROGRESS NOTES
[facility]   Tatum Perry MD    Patient Name: Janene Castillo  Patient YOB: 1960   Date of Service: 10/13/22    CC: Follow-up Eczema    HPI: Janene Castillo is a 62 y.o. female here today for follow-up of Eczema, prurigo nodularis and ACD, last seen .  Previous treatments include Methotrexate, Clobetsol, Triamcinolone, and Betamethasone.  Overall, the Eczema is worse.  Treatment plan was followed as directed.    Past Medical History:   Diagnosis Date    Allergic contact dermatitis     balsam of peru, bacitracin, fragrance, methyldibromo glutaronite    Anxiety     Chronic pain     HTN (hypertension)     Hyperlipidemia     Plaque psoriasis     Prurigo nodularis     Psoriatic arthritis     Stage 3b chronic kidney disease      Past Surgical History:   Procedure Laterality Date    ANGIOGRAM, CORONARY, WITH LEFT HEART CATHETERIZATION N/A 2022    Procedure: Angiogram, Coronary, with Left Heart Cath;  Surgeon: Jason Ratliff MD;  Location: [facility] CATH LAB;  Service: Cardiology;  Laterality: N/A;     SECTION       Review of patient's allergies indicates:   Allergen Reactions    Bacitracin Dermatitis    Codeine sulfate     Codeine Rash     Rash     Methyldibromoglutaronitrile Dermatitis    Permethrin Rash     Rash     Sulfa (sulfonamide antibiotics) Other (See Comments) and Rash     unknown       Current Outpatient Medications:     amLODIPine (NORVASC) 2.5 MG tablet, Take 1 tablet by mouth once daily, Disp: 90 tablet, Rfl: 0    benazepriL (LOTENSIN) 20 MG tablet, Take 1 tablet by mouth once daily, Disp: 90 tablet, Rfl: 0    buPROPion (WELLBUTRIN) 100 MG tablet, Take 1 tablet (100 mg total) by mouth 2 (two) times daily., Disp: 180 tablet, Rfl: 1    calcipotriene-betamethasone (TACLONEX) ointment, Apply to AA on body BID, tapering with improvement, Disp: 60 g, Rfl: 2    ezetimibe (ZETIA) 10 mg tablet, TAKE 1 TABLET BY MOUTH ONCE DAILY IN THE EVENING, Disp: 90 tablet, Rfl: 0     hydrOXYzine pamoate (VISTARIL) 25 MG Cap, TAKE 1 TO 2 CAPSULES BY MOUTH EVERY 8 HOURS AS NEEDED, Disp: 60 capsule, Rfl: 0    mupirocin (BACTROBAN) 2 % ointment, APPLY  OINTMENT TOPICALLY THREE TIMES DAILY, Disp: 90 g, Rfl: 1    oxyCODONE-acetaminophen (PERCOCET)  mg per tablet, Take 1 tablet by mouth every 12 (twelve) hours as needed., Disp: , Rfl:     oxyCODONE-acetaminophen (PERCOCET)  mg per tablet, Take 1 tablet by mouth every 4 (four) hours as needed for Pain., Disp: 2 tablet, Rfl: 0    SKYRIZI 150 mg/mL Syrg, INJECT 1ml SUBCUTANEOUSLY EVERY 12 WEEKS, Disp: , Rfl:     tiZANidine (ZANAFLEX) 4 MG tablet, Take 1 tablet by mouth twice daily, Disp: 90 tablet, Rfl: 0    varenicline (CHANTIX) 1 mg Tab, Take 1 tablet by mouth twice daily, Disp: 56 tablet, Rfl: 0    ROS: A focused review of systems was obtained and negative.     Exam: A focused skin exam was performed. All areas examined were normal except as mentioned in the assessment and plan below.  General Appearance of the patient is well developed and well nourished.  Orientation: alert and oriented x 3.  Mood and affect: pleasant.    Assessment:   The primary encounter diagnosis was Psoriasis. Diagnoses of Psoriatic arthritis and Allergic contact dermatitis due to other agents were also pertinent to this visit.    Plan:        Eczema   - eczematous papules and plaques on a background of Xerosis    Status: Inadequately controlled    Total BSA 15%    Plan: Counseling  I counseled the patient regarding the following:  Skin care: Patient should bathe using lukewarm water with a mild cleanser and moisturize immediately after. Emollients should be applied at least 2-3 times daily. Avoid scented detergents or fabric softeners. Keep fingernails short. Avoid excessive hand washing.  Expectations: The patient is aware that eczema is chronic in nature and can improve with moisturizers and topical steroids and worsen with stress, scented soaps, detergents,  scratching, dry skin, changes in weather and skin infections.  Contact office if: Eczema worsens or fails to improve despite several weeks of treatment; patient develops skin infections (such as: yellow honey colored crusts or cold sores).    I recommended the following:  Moisturizers    - will start dupixent as she has failed topical steroids and MTX    Dupixent Counseling: I discussed with the patient the risks of dupilumab including but not limited to eye infection and irritation, cold sores, injection site reactions, worsening of asthma, allergic reactions and increased risk of parasitic infection. Live vaccines should be avoided while taking dupilumab. Dupilumab will also interact with certain medications such as warfarin and cyclosporine. The patient understands that monitoring is required and they must alert us or the primary physician if symptoms of infection or other concerning signs are noted.    Prurigo Nodularis  - erythematous nodules with central erosions located on the bilateral upper and lower extremeties    Plan: Counseling  I counseled the patient regarding the following:  Skin care: Recommend trimming nails short, anti-itch lotions such as Sarna, topical steroids and antihistamines.  Expectations: Prurigo Nodularis is a self-inflicted lesion that results from picking or rubbing the same spot of skin over and over again. If the itch-scratch cycle is broken, the lesions will resolve.  Contact office if: Prurigo Nodularis worsens, or if itching is accompanied by constitutional symptoms.    - failed topical steroids, hydroxyzine, Wellbutrin, and MTX    High Risk Medication Monitoring (Z79.899) : The risks and benefits of the medication were reviewed in full with the patient. Should any side effects occur, the patient will stop the medication and contact me immediately.    Dupixent Counseling: I discussed with the patient the risks of dupilumab including but not limited to eye infection and irritation,  cold sores, injection site reactions, worsening of asthma, allergic reactions and increased risk of parasitic infection. Live vaccines should be avoided while taking dupilumab. Dupilumab will also interact with certain medications such as warfarin and cyclosporine. The patient understands that monitoring is required and they must alert us or the primary physician if symptoms of infection or other concerning signs are noted.      RTC: for injection training    Tatum Perry MD

## 2022-10-17 ENCOUNTER — OFFICE VISIT (OUTPATIENT)
Dept: FAMILY MEDICINE | Facility: CLINIC | Age: 62
End: 2022-10-17
Payer: MEDICARE

## 2022-10-17 VITALS
HEIGHT: 67 IN | BODY MASS INDEX: 29.98 KG/M2 | RESPIRATION RATE: 18 BRPM | DIASTOLIC BLOOD PRESSURE: 61 MMHG | WEIGHT: 191 LBS | HEART RATE: 84 BPM | TEMPERATURE: 98 F | OXYGEN SATURATION: 99 % | SYSTOLIC BLOOD PRESSURE: 108 MMHG

## 2022-10-17 DIAGNOSIS — I10 HYPERTENSION, UNSPECIFIED TYPE: Primary | ICD-10-CM

## 2022-10-17 DIAGNOSIS — L29.9 PRURITUS: ICD-10-CM

## 2022-10-17 DIAGNOSIS — M62.838 SPASM OF MUSCLE: ICD-10-CM

## 2022-10-17 DIAGNOSIS — F17.200 SMOKER: ICD-10-CM

## 2022-10-17 PROCEDURE — 99213 OFFICE O/P EST LOW 20 MIN: CPT | Mod: ,,, | Performed by: FAMILY MEDICINE

## 2022-10-17 PROCEDURE — 1160F PR REVIEW ALL MEDS BY PRESCRIBER/CLIN PHARMACIST DOCUMENTED: ICD-10-PCS | Mod: ,,, | Performed by: FAMILY MEDICINE

## 2022-10-17 PROCEDURE — 4010F PR ACE/ARB THEARPY RXD/TAKEN: ICD-10-PCS | Mod: ,,, | Performed by: FAMILY MEDICINE

## 2022-10-17 PROCEDURE — 4010F ACE/ARB THERAPY RXD/TAKEN: CPT | Mod: ,,, | Performed by: FAMILY MEDICINE

## 2022-10-17 PROCEDURE — 3078F DIAST BP <80 MM HG: CPT | Mod: ,,, | Performed by: FAMILY MEDICINE

## 2022-10-17 PROCEDURE — 1160F RVW MEDS BY RX/DR IN RCRD: CPT | Mod: ,,, | Performed by: FAMILY MEDICINE

## 2022-10-17 PROCEDURE — 3078F PR MOST RECENT DIASTOLIC BLOOD PRESSURE < 80 MM HG: ICD-10-PCS | Mod: ,,, | Performed by: FAMILY MEDICINE

## 2022-10-17 PROCEDURE — 3074F SYST BP LT 130 MM HG: CPT | Mod: ,,, | Performed by: FAMILY MEDICINE

## 2022-10-17 PROCEDURE — 3074F PR MOST RECENT SYSTOLIC BLOOD PRESSURE < 130 MM HG: ICD-10-PCS | Mod: ,,, | Performed by: FAMILY MEDICINE

## 2022-10-17 PROCEDURE — 1159F MED LIST DOCD IN RCRD: CPT | Mod: ,,, | Performed by: FAMILY MEDICINE

## 2022-10-17 PROCEDURE — 1159F PR MEDICATION LIST DOCUMENTED IN MEDICAL RECORD: ICD-10-PCS | Mod: ,,, | Performed by: FAMILY MEDICINE

## 2022-10-17 PROCEDURE — 99213 PR OFFICE/OUTPT VISIT, EST, LEVL III, 20-29 MIN: ICD-10-PCS | Mod: ,,, | Performed by: FAMILY MEDICINE

## 2022-10-17 RX ORDER — HYDROXYZINE PAMOATE 25 MG/1
25 CAPSULE ORAL EVERY 8 HOURS PRN
Qty: 60 CAPSULE | Refills: 0 | Status: SHIPPED | OUTPATIENT
Start: 2022-10-17 | End: 2022-12-30

## 2022-10-17 RX ORDER — BENAZEPRIL HYDROCHLORIDE 20 MG/1
20 TABLET ORAL DAILY
Qty: 90 TABLET | Refills: 0 | Status: SHIPPED | OUTPATIENT
Start: 2022-10-17 | End: 2023-03-31

## 2022-10-17 RX ORDER — TIZANIDINE 4 MG/1
4 TABLET ORAL 2 TIMES DAILY
Qty: 90 TABLET | Refills: 0 | Status: SHIPPED | OUTPATIENT
Start: 2022-10-17 | End: 2022-12-13

## 2022-10-17 RX ORDER — VARENICLINE TARTRATE 1 MG/1
1 TABLET, FILM COATED ORAL 2 TIMES DAILY
Qty: 56 TABLET | Refills: 0 | Status: SHIPPED | OUTPATIENT
Start: 2022-10-17 | End: 2022-12-14 | Stop reason: SDUPTHER

## 2022-10-17 NOTE — PROGRESS NOTES
Janene Castillo is a 62 y.o. female seen today for follow-up on her anxiety and cigarette smoking.  She is cutting back on her smoking but unfortunately is exposed to others who smoke around her.  We discussed the importance of discontinuing smoking today.  She has had some mild chest discomfort which is at her baseline and no worsening of her shortness of breath.  Her GFR remains low and she is recently been released for 1 year by Nephrology.  Today she has no other complaints other than reviewing her labs.  I did encourage her to continue to avoid all NSAIDs and increase the.  He will stick to her low fat diet since her LDL cholesterol was mildly elevated and 82.    Past Medical History:   Diagnosis Date    Allergic contact dermatitis     balsam of peru, bacitracin, fragrance, methyldibromo glutaronite    Anxiety     Chronic pain     HTN (hypertension)     Hyperlipidemia     Plaque psoriasis     Prurigo nodularis     Psoriatic arthritis     Stage 3b chronic kidney disease      Family History   Problem Relation Age of Onset    Hypertension Mother     Heart disease Father     Heart attack Father     Heart failure Father     Pacemaker/defibrilator Father     Melanoma Neg Hx      Current Outpatient Medications on File Prior to Visit   Medication Sig Dispense Refill    amLODIPine (NORVASC) 2.5 MG tablet Take 1 tablet by mouth once daily 90 tablet 0    buPROPion (WELLBUTRIN) 100 MG tablet Take 1 tablet (100 mg total) by mouth 2 (two) times daily. 180 tablet 1    calcipotriene-betamethasone (TACLONEX) ointment Apply to AA on body BID, tapering with improvement 60 g 2    ezetimibe (ZETIA) 10 mg tablet TAKE 1 TABLET BY MOUTH ONCE DAILY IN THE EVENING 90 tablet 0    mupirocin (BACTROBAN) 2 % ointment APPLY  OINTMENT TOPICALLY THREE TIMES DAILY 90 g 1    oxyCODONE-acetaminophen (PERCOCET)  mg per tablet Take 1 tablet by mouth every 4 (four) hours as needed for Pain. 2 tablet 0    SKYRIZI 150 mg/mL Syrg INJECT 1ml  SUBCUTANEOUSLY EVERY 12 WEEKS      [DISCONTINUED] benazepriL (LOTENSIN) 20 MG tablet Take 1 tablet by mouth once daily 90 tablet 0    [DISCONTINUED] hydrOXYzine pamoate (VISTARIL) 25 MG Cap TAKE 1 TO 2 CAPSULES BY MOUTH EVERY 8 HOURS AS NEEDED 60 capsule 0    [DISCONTINUED] tiZANidine (ZANAFLEX) 4 MG tablet Take 1 tablet by mouth twice daily 90 tablet 0    [DISCONTINUED] oxyCODONE-acetaminophen (PERCOCET)  mg per tablet Take 1 tablet by mouth every 12 (twelve) hours as needed.      [DISCONTINUED] varenicline (CHANTIX) 1 mg Tab Take 1 tablet by mouth twice daily 56 tablet 0     No current facility-administered medications on file prior to visit.       There is no immunization history on file for this patient.    Review of Systems   Constitutional:  Negative for fever, malaise/fatigue and weight loss.   Respiratory:  Negative for shortness of breath.    Cardiovascular:  Positive for chest pain. Negative for palpitations.   Gastrointestinal:  Negative for nausea and vomiting.   Psychiatric/Behavioral:  Negative for depression.       Vitals:    10/17/22 0825   BP: 108/61   Pulse: 84   Resp: 18   Temp: 98.1 °F (36.7 °C)       Physical Exam  Vitals reviewed.   Constitutional:       Appearance: Normal appearance.   HENT:      Head: Normocephalic.   Eyes:      Extraocular Movements: Extraocular movements intact.      Conjunctiva/sclera: Conjunctivae normal.      Pupils: Pupils are equal, round, and reactive to light.   Neck:      Thyroid: No thyroid mass or thyromegaly.   Cardiovascular:      Rate and Rhythm: Normal rate and regular rhythm.      Heart sounds: Normal heart sounds. No murmur heard.    No gallop.   Pulmonary:      Effort: Pulmonary effort is normal. No respiratory distress.      Breath sounds: Normal breath sounds. No wheezing or rales.   Skin:     General: Skin is warm and dry.      Coloration: Skin is not jaundiced or pale.   Neurological:      Mental Status: She is alert.   Psychiatric:         Mood  and Affect: Mood normal.         Behavior: Behavior normal.         Thought Content: Thought content normal.         Judgment: Judgment normal.        Assessment and Plan  Hypertension, unspecified type  -     benazepriL (LOTENSIN) 20 MG tablet; Take 1 tablet (20 mg total) by mouth once daily.  Dispense: 90 tablet; Refill: 0    Pruritus  -     hydrOXYzine pamoate (VISTARIL) 25 MG Cap; Take 1 capsule (25 mg total) by mouth every 8 (eight) hours as needed (itching).  Dispense: 60 capsule; Refill: 0    Smoker  -     varenicline (CHANTIX) 1 mg Tab; Take 1 tablet (1 mg total) by mouth 2 (two) times daily.  Dispense: 56 tablet; Refill: 0    Spasm of muscle  -     tiZANidine (ZANAFLEX) 4 MG tablet; Take 1 tablet (4 mg total) by mouth 2 (two) times daily.  Dispense: 90 tablet; Refill: 0          Return to clinic in January for follow-up or as needed.    Health Maintenance Topics with due status: Not Due       Topic Last Completion Date    Cervical Cancer Screening 02/25/2020    Colorectal Cancer Screening 12/22/2020    Lipid Panel 07/01/2022    Mammogram 08/22/2022

## 2022-10-26 ENCOUNTER — OFFICE VISIT (OUTPATIENT)
Dept: DERMATOLOGY | Facility: CLINIC | Age: 62
End: 2022-10-26
Payer: MEDICARE

## 2022-10-26 DIAGNOSIS — L28.1 PRURIGO NODULARIS: Primary | ICD-10-CM

## 2022-10-26 DIAGNOSIS — L20.84 INTRINSIC ECZEMA: ICD-10-CM

## 2022-10-26 DIAGNOSIS — L08.9 SKIN INFECTION: ICD-10-CM

## 2022-10-26 PROCEDURE — 1159F MED LIST DOCD IN RCRD: CPT | Mod: CPTII,,, | Performed by: DERMATOLOGY

## 2022-10-26 PROCEDURE — 87186 SC STD MICRODIL/AGAR DIL: CPT | Mod: ,,, | Performed by: CLINICAL MEDICAL LABORATORY

## 2022-10-26 PROCEDURE — 1160F RVW MEDS BY RX/DR IN RCRD: CPT | Mod: CPTII,,, | Performed by: DERMATOLOGY

## 2022-10-26 PROCEDURE — 99214 OFFICE O/P EST MOD 30 MIN: CPT | Mod: ,,, | Performed by: DERMATOLOGY

## 2022-10-26 PROCEDURE — 4010F ACE/ARB THERAPY RXD/TAKEN: CPT | Mod: CPTII,,, | Performed by: DERMATOLOGY

## 2022-10-26 PROCEDURE — 1160F PR REVIEW ALL MEDS BY PRESCRIBER/CLIN PHARMACIST DOCUMENTED: ICD-10-PCS | Mod: CPTII,,, | Performed by: DERMATOLOGY

## 2022-10-26 PROCEDURE — 1159F PR MEDICATION LIST DOCUMENTED IN MEDICAL RECORD: ICD-10-PCS | Mod: CPTII,,, | Performed by: DERMATOLOGY

## 2022-10-26 PROCEDURE — 87070 CULTURE, WOUND: ICD-10-PCS | Mod: ,,, | Performed by: CLINICAL MEDICAL LABORATORY

## 2022-10-26 PROCEDURE — 87186 CULTURE, WOUND: ICD-10-PCS | Mod: ,,, | Performed by: CLINICAL MEDICAL LABORATORY

## 2022-10-26 PROCEDURE — 87077 CULTURE AEROBIC IDENTIFY: CPT | Mod: ,,, | Performed by: CLINICAL MEDICAL LABORATORY

## 2022-10-26 PROCEDURE — 87077 CULTURE, WOUND: ICD-10-PCS | Mod: ,,, | Performed by: CLINICAL MEDICAL LABORATORY

## 2022-10-26 PROCEDURE — 4010F PR ACE/ARB THEARPY RXD/TAKEN: ICD-10-PCS | Mod: CPTII,,, | Performed by: DERMATOLOGY

## 2022-10-26 PROCEDURE — 87070 CULTURE OTHR SPECIMN AEROBIC: CPT | Mod: ,,, | Performed by: CLINICAL MEDICAL LABORATORY

## 2022-10-26 PROCEDURE — 99214 PR OFFICE/OUTPT VISIT, EST, LEVL IV, 30-39 MIN: ICD-10-PCS | Mod: ,,, | Performed by: DERMATOLOGY

## 2022-10-26 NOTE — PROGRESS NOTES
[facility]   Tatum Perry MD    Patient Name: Janene Castillo  Patient YOB: 1960   Date of Service: 10/26/22    CC: Follow-up Allergic Contact Dermatitis    HPI: Janene Castillo is a 62 y.o. female here today for follow-up of ACD, last seen 10/22.  Previous treatments include taclonex.  Overall, the ACD is worse.  Treatment plan was followed as directed.    Past Medical History:   Diagnosis Date    Allergic contact dermatitis     balsam of peru, bacitracin, fragrance, methyldibromo glutaronite    Anxiety     Chronic pain     HTN (hypertension)     Hyperlipidemia     Plaque psoriasis     Prurigo nodularis     Psoriatic arthritis     Stage 3b chronic kidney disease      Past Surgical History:   Procedure Laterality Date    ANGIOGRAM, CORONARY, WITH LEFT HEART CATHETERIZATION N/A 2022    Procedure: Angiogram, Coronary, with Left Heart Cath;  Surgeon: Jason Ratliff MD;  Location: [facility] CATH LAB;  Service: Cardiology;  Laterality: N/A;     SECTION       Review of patient's allergies indicates:   Allergen Reactions    Bacitracin Dermatitis    Codeine sulfate     Codeine Rash     Rash     Methyldibromoglutaronitrile Dermatitis    Permethrin Rash     Rash     Sulfa (sulfonamide antibiotics) Other (See Comments) and Rash     unknown       Current Outpatient Medications:     amLODIPine (NORVASC) 2.5 MG tablet, Take 1 tablet by mouth once daily, Disp: 90 tablet, Rfl: 0    benazepriL (LOTENSIN) 20 MG tablet, Take 1 tablet (20 mg total) by mouth once daily., Disp: 90 tablet, Rfl: 0    buPROPion (WELLBUTRIN) 100 MG tablet, Take 1 tablet (100 mg total) by mouth 2 (two) times daily., Disp: 180 tablet, Rfl: 1    calcipotriene-betamethasone (TACLONEX) ointment, Apply to AA on body BID, tapering with improvement, Disp: 60 g, Rfl: 2    ezetimibe (ZETIA) 10 mg tablet, TAKE 1 TABLET BY MOUTH ONCE DAILY IN THE EVENING, Disp: 90 tablet, Rfl: 1    famotidine (PEPCID) 20 MG tablet, Take 1 tablet (20  mg total) by mouth 2 (two) times daily., Disp: 180 tablet, Rfl: 0    hydrOXYzine pamoate (VISTARIL) 25 MG Cap, Take 1 capsule (25 mg total) by mouth every 8 (eight) hours as needed (itching)., Disp: 60 capsule, Rfl: 0    mupirocin (BACTROBAN) 2 % ointment, APPLY  OINTMENT TOPICALLY THREE TIMES DAILY, Disp: 90 g, Rfl: 1    oxyCODONE-acetaminophen (PERCOCET)  mg per tablet, Take 1 tablet by mouth every 4 (four) hours as needed for Pain., Disp: 2 tablet, Rfl: 0    SKYRIZI 150 mg/mL Syrg, INJECT 1ml SUBCUTANEOUSLY EVERY 12 WEEKS, Disp: , Rfl:     tiZANidine (ZANAFLEX) 4 MG tablet, Take 1 tablet (4 mg total) by mouth 2 (two) times daily., Disp: 90 tablet, Rfl: 0    varenicline (CHANTIX) 1 mg Tab, Take 1 tablet (1 mg total) by mouth 2 (two) times daily., Disp: 56 tablet, Rfl: 0    ROS: A focused review of systems was obtained and negative.     Exam: A focused skin exam was performed. All areas examined were normal except as mentioned in the assessment and plan below.  General Appearance of the patient is well developed and well nourished.  Orientation: alert and oriented x 3.  Mood and affect: pleasant.    Assessment:   The primary encounter diagnosis was Prurigo nodularis. Diagnoses of Skin infection and Intrinsic eczema were also pertinent to this visit.    Plan:      Prurigo Nodularis  - erythematous nodules with central erosions located on the bilateral upper and lower extremities    Plan: Counseling  I counseled the patient regarding the following:  Skin care: Recommend trimming nails short, anti-itch lotions such as Sarna, topical steroids and antihistamines.  Expectations: Prurigo Nodularis is a self-inflicted lesion that results from picking or rubbing the same spot of skin over and over again. If the itch-scratch cycle is broken, the lesions will resolve.  Contact office if: Prurigo Nodularis worsens, or if itching is accompanied by constitutional symptoms.    - Patient instructed to mix Taclonex and  mupirocin ointment together and apply to affected area while waiting for culture results  - continue dupixent    Skin Infection, NOS   - crusting of PN    Plan: Counseling  I counseled the patient regarding the following:  Skin care: Patients with purulence or fluid collections should have their wounds re-opened, drained, cultured and irrigated. All wound infections should be treated with antibiotics.  Expectations: Wound Infections usually occur 4-7 days postoperatively. Patients exhibit, pain, rednes, swelling, cellulitic changes and fever.  Contact Office if: Wound Infection fails to respond to treatment or worsens, patient develops a fever, or if redness spreads despite antibiotics.    A bacterial culture was obtained from the right hand    Eczema   - eczematous papules and plaques on a background of Xerosis    Status: Inadequately controlled      Plan: Counseling  I counseled the patient regarding the following:  Skin care: Patient should bathe using lukewarm water with a mild cleanser and moisturize immediately after. Emollients should be applied at least 2-3 times daily. Avoid scented detergents or fabric softeners. Keep fingernails short. Avoid excessive hand washing.  Expectations: The patient is aware that eczema is chronic in nature and can improve with moisturizers and topical steroids and worsen with stress, scented soaps, detergents, scratching, dry skin, changes in weather and skin infections.  Contact office if: Eczema worsens or fails to improve despite several weeks of treatment; patient develops skin infections (such as: yellow honey colored crusts or cold sores).    I recommended the following:  Moisturizers    - continue Dupixent    High Risk Medication Monitoring (Z79.899) : The risks and benefits of the medication were reviewed in full with the patient. Should any side effects occur, the patient will stop the medication and contact me immediately.    Follow up in about 3 months (around  1/26/2023).    Tatum Perry MD

## 2022-10-30 LAB — MICROORGANISM SPEC CULT: ABNORMAL

## 2022-10-31 ENCOUNTER — PATIENT MESSAGE (OUTPATIENT)
Dept: DERMATOLOGY | Facility: CLINIC | Age: 62
End: 2022-10-31
Payer: MEDICARE

## 2022-10-31 DIAGNOSIS — L08.9 SKIN INFECTION: Primary | ICD-10-CM

## 2022-10-31 RX ORDER — DOXYCYCLINE 100 MG/1
CAPSULE ORAL
Qty: 20 CAPSULE | Refills: 0 | Status: SHIPPED | OUTPATIENT
Start: 2022-10-31 | End: 2022-11-29

## 2022-11-03 ENCOUNTER — OFFICE VISIT (OUTPATIENT)
Dept: CARDIOLOGY | Facility: CLINIC | Age: 62
End: 2022-11-03
Payer: MEDICARE

## 2022-11-03 VITALS
HEIGHT: 67 IN | SYSTOLIC BLOOD PRESSURE: 120 MMHG | DIASTOLIC BLOOD PRESSURE: 82 MMHG | OXYGEN SATURATION: 99 % | BODY MASS INDEX: 30.29 KG/M2 | HEART RATE: 72 BPM | WEIGHT: 193 LBS

## 2022-11-03 DIAGNOSIS — I10 HYPERTENSION, UNSPECIFIED TYPE: Primary | ICD-10-CM

## 2022-11-03 PROCEDURE — 3074F SYST BP LT 130 MM HG: CPT | Mod: CPTII,,, | Performed by: NURSE PRACTITIONER

## 2022-11-03 PROCEDURE — 4010F ACE/ARB THERAPY RXD/TAKEN: CPT | Mod: CPTII,,, | Performed by: NURSE PRACTITIONER

## 2022-11-03 PROCEDURE — 1160F PR REVIEW ALL MEDS BY PRESCRIBER/CLIN PHARMACIST DOCUMENTED: ICD-10-PCS | Mod: CPTII,,, | Performed by: NURSE PRACTITIONER

## 2022-11-03 PROCEDURE — 3074F PR MOST RECENT SYSTOLIC BLOOD PRESSURE < 130 MM HG: ICD-10-PCS | Mod: CPTII,,, | Performed by: NURSE PRACTITIONER

## 2022-11-03 PROCEDURE — 1159F PR MEDICATION LIST DOCUMENTED IN MEDICAL RECORD: ICD-10-PCS | Mod: CPTII,,, | Performed by: NURSE PRACTITIONER

## 2022-11-03 PROCEDURE — 1160F RVW MEDS BY RX/DR IN RCRD: CPT | Mod: CPTII,,, | Performed by: NURSE PRACTITIONER

## 2022-11-03 PROCEDURE — 1159F MED LIST DOCD IN RCRD: CPT | Mod: CPTII,,, | Performed by: NURSE PRACTITIONER

## 2022-11-03 PROCEDURE — 93005 ELECTROCARDIOGRAM TRACING: CPT | Mod: PBBFAC | Performed by: INTERNAL MEDICINE

## 2022-11-03 PROCEDURE — 3079F DIAST BP 80-89 MM HG: CPT | Mod: CPTII,,, | Performed by: NURSE PRACTITIONER

## 2022-11-03 PROCEDURE — 93010 EKG 12-LEAD: ICD-10-PCS | Mod: S$PBB,,, | Performed by: INTERNAL MEDICINE

## 2022-11-03 PROCEDURE — 3008F PR BODY MASS INDEX (BMI) DOCUMENTED: ICD-10-PCS | Mod: CPTII,,, | Performed by: NURSE PRACTITIONER

## 2022-11-03 PROCEDURE — 99214 PR OFFICE/OUTPT VISIT, EST, LEVL IV, 30-39 MIN: ICD-10-PCS | Mod: S$PBB,,, | Performed by: NURSE PRACTITIONER

## 2022-11-03 PROCEDURE — 3079F PR MOST RECENT DIASTOLIC BLOOD PRESSURE 80-89 MM HG: ICD-10-PCS | Mod: CPTII,,, | Performed by: NURSE PRACTITIONER

## 2022-11-03 PROCEDURE — 99214 OFFICE O/P EST MOD 30 MIN: CPT | Mod: S$PBB,,, | Performed by: NURSE PRACTITIONER

## 2022-11-03 PROCEDURE — 4010F PR ACE/ARB THEARPY RXD/TAKEN: ICD-10-PCS | Mod: CPTII,,, | Performed by: NURSE PRACTITIONER

## 2022-11-03 PROCEDURE — 93010 ELECTROCARDIOGRAM REPORT: CPT | Mod: S$PBB,,, | Performed by: INTERNAL MEDICINE

## 2022-11-03 PROCEDURE — 99214 OFFICE O/P EST MOD 30 MIN: CPT | Mod: PBBFAC | Performed by: NURSE PRACTITIONER

## 2022-11-03 PROCEDURE — 3008F BODY MASS INDEX DOCD: CPT | Mod: CPTII,,, | Performed by: NURSE PRACTITIONER

## 2022-11-03 RX ORDER — CALCIUM CARBONATE 600 MG
600 TABLET ORAL DAILY
COMMUNITY

## 2022-11-03 RX ORDER — ZINC GLUCONATE 50 MG
50 TABLET ORAL DAILY
COMMUNITY

## 2022-11-03 RX ORDER — DUPILUMAB 300 MG/2ML
300 INJECTION, SOLUTION SUBCUTANEOUS
COMMUNITY
Start: 2022-10-17 | End: 2023-03-30

## 2022-11-03 NOTE — PROGRESS NOTES
[facility] note        DATE OF SERVICE: 2022       PCP: Asael Huizar MD      CHIEF COMPLAINT:   Chief Complaint   Patient presents with    Palpitations     At times.        HISTORY OF PRESENT ILLNESS:  Janene Castillo is a 62 y.o. female with a PMH of   Past Medical History:   Diagnosis Date    Allergic contact dermatitis     balsam of peru, bacitracin, fragrance, methyldibromo glutaronite    Anxiety     Chronic pain     HTN (hypertension)     Hyperlipidemia     Plaque psoriasis     Prurigo nodularis     Psoriatic arthritis     Stage 3b chronic kidney disease      who presents for HD follow s/p LHC which demonstrated normal coronaries, normal LVEDP and normal LVEF.  Chief Complaint   Patient presents with    Palpitations     At times.            PAST MEDICAL HISTORY:  Past Medical History:   Diagnosis Date    Allergic contact dermatitis     balsam of peru, bacitracin, fragrance, methyldibromo glutaronite    Anxiety     Chronic pain     HTN (hypertension)     Hyperlipidemia     Plaque psoriasis     Prurigo nodularis     Psoriatic arthritis     Stage 3b chronic kidney disease        PAST SURGICAL HISTORY:  Past Surgical History:   Procedure Laterality Date    ANGIOGRAM, CORONARY, WITH LEFT HEART CATHETERIZATION N/A 2022    Procedure: Angiogram, Coronary, with Left Heart Cath;  Surgeon: Jason Ratliff MD;  Location: [facility] CATH LAB;  Service: Cardiology;  Laterality: N/A;     SECTION         SOCIAL HISTORY:  Social History     Socioeconomic History    Marital status:    Tobacco Use    Smoking status: Former    Smokeless tobacco: Never   Substance and Sexual Activity    Alcohol use: Not Currently     Social Determinants of Health     Financial Resource Strain: High Risk    Difficulty of Paying Living Expenses: Very hard   Food Insecurity: Food Insecurity Present    Worried About Running Out of Food in the Last Year: Often true    Ran Out of Food in the Last Year:  Often true   Transportation Needs: No Transportation Needs    Lack of Transportation (Medical): No    Lack of Transportation (Non-Medical): No   Physical Activity: Insufficiently Active    Days of Exercise per Week: 4 days    Minutes of Exercise per Session: 20 min   Stress: Stress Concern Present    Feeling of Stress : Very much   Social Connections: Moderately Integrated    Frequency of Communication with Friends and Family: Three times a week    Frequency of Social Gatherings with Friends and Family: Three times a week    Attends Yazidism Services: 1 to 4 times per year    Active Member of Clubs or Organizations: Yes    Attends Club or Organization Meetings: 1 to 4 times per year    Marital Status: Never    Housing Stability: High Risk    Unable to Pay for Housing in the Last Year: No    Number of Places Lived in the Last Year: 2    Unstable Housing in the Last Year: Yes       FAMILY HISTORY:  Family History   Problem Relation Age of Onset    Hypertension Mother     Heart disease Father     Heart attack Father     Heart failure Father     Pacemaker/defibrilator Father     Melanoma Neg Hx          ALLERGIES:  Review of patient's allergies indicates:   Allergen Reactions    Bacitracin Dermatitis    Codeine sulfate     Codeine Rash     Rash     Methyldibromoglutaronitrile Dermatitis    Permethrin Rash     Rash     Sulfa (sulfonamide antibiotics) Other (See Comments) and Rash     unknown        MEDICATIONS:    Current Outpatient Medications:     amLODIPine (NORVASC) 2.5 MG tablet, Take 1 tablet by mouth once daily, Disp: 90 tablet, Rfl: 0    benazepriL (LOTENSIN) 20 MG tablet, Take 1 tablet (20 mg total) by mouth once daily., Disp: 90 tablet, Rfl: 0    buPROPion (WELLBUTRIN) 100 MG tablet, Take 1 tablet (100 mg total) by mouth 2 (two) times daily., Disp: 180 tablet, Rfl: 1    calcium carbonate (OS-CAIN) 600 mg calcium (1,500 mg) Tab, Take 600 mg by mouth once daily., Disp: , Rfl:     doxycycline (VIBRAMYCIN)  "100 MG Cap, Take one capsule with food twice daily. DO NOT TAKE WITH DAIRY. Wear sunscreen while taking., Disp: 20 capsule, Rfl: 0    DUPIXENT SYRINGE 300 mg/2 mL Syrg, Inject 300 mg into the skin every 14 (fourteen) days., Disp: , Rfl:     ezetimibe (ZETIA) 10 mg tablet, TAKE 1 TABLET BY MOUTH ONCE DAILY IN THE EVENING, Disp: 90 tablet, Rfl: 1    famotidine (PEPCID) 20 MG tablet, Take 1 tablet (20 mg total) by mouth 2 (two) times daily., Disp: 180 tablet, Rfl: 0    hydrOXYzine pamoate (VISTARIL) 25 MG Cap, Take 1 capsule (25 mg total) by mouth every 8 (eight) hours as needed (itching)., Disp: 60 capsule, Rfl: 0    oxyCODONE-acetaminophen (PERCOCET)  mg per tablet, Take 1 tablet by mouth every 4 (four) hours as needed for Pain., Disp: 2 tablet, Rfl: 0    tiZANidine (ZANAFLEX) 4 MG tablet, Take 1 tablet (4 mg total) by mouth 2 (two) times daily., Disp: 90 tablet, Rfl: 0    valACYclovir (VALTREX) 1000 MG tablet, TAKE 2 TABLETS BY MOUTH TWICE DAILY FOR COLD SYMPTOMS, Disp: 4 tablet, Rfl: 0    varenicline (CHANTIX) 1 mg Tab, Take 1 tablet (1 mg total) by mouth 2 (two) times daily., Disp: 56 tablet, Rfl: 0    zinc gluconate 50 mg tablet, Take 50 mg by mouth once daily., Disp: , Rfl:     calcipotriene-betamethasone (TACLONEX) ointment, Apply to AA on body BID, tapering with improvement (Patient not taking: Reported on 11/3/2022), Disp: 60 g, Rfl: 2    mupirocin (BACTROBAN) 2 % ointment, APPLY  OINTMENT TOPICALLY THREE TIMES DAILY (Patient not taking: Reported on 11/3/2022), Disp: 90 g, Rfl: 1    SKYRIZI 150 mg/mL Syrg, INJECT 1ml SUBCUTANEOUSLY EVERY 12 WEEKS, Disp: , Rfl:   Medications have been reviewed and reconciled.     PHYSICAL EXAM:  /82 (BP Location: Left arm, Patient Position: Sitting)   Pulse 72   Ht 5' 7" (1.702 m)   Wt 87.5 kg (193 lb)   SpO2 99%   BMI 30.23 kg/m²   Wt Readings from Last 3 Encounters:   11/03/22 87.5 kg (193 lb)   10/17/22 86.6 kg (191 lb)   10/01/22 86.7 kg (191 lb 2.2 oz)    "   Body mass index is 30.23 kg/m².    Physical Exam  Vitals and nursing note reviewed.   Constitutional:       Appearance: Normal appearance. She is normal weight.   HENT:      Head: Normocephalic and atraumatic.   Eyes:      Pupils: Pupils are equal, round, and reactive to light.   Neck:      Vascular: No carotid bruit.   Cardiovascular:      Rate and Rhythm: Normal rate and regular rhythm.      Pulses: Normal pulses.      Heart sounds: Normal heart sounds.   Pulmonary:      Effort: Pulmonary effort is normal.      Breath sounds: Normal breath sounds.   Abdominal:      General: Bowel sounds are normal.      Palpations: Abdomen is soft.   Musculoskeletal:      Cervical back: Neck supple.      Right lower leg: No edema.      Left lower leg: No edema.   Skin:     General: Skin is warm and dry.      Capillary Refill: Capillary refill takes less than 2 seconds.   Neurological:      General: No focal deficit present.      Mental Status: She is alert and oriented to person, place, and time.   Psychiatric:         Mood and Affect: Mood normal.         Behavior: Behavior normal.       LABS REVIEWED:  Lab Results   Component Value Date    WBC 4.46 (L) 09/27/2022    RBC 4.57 09/27/2022    HGB 12.2 09/27/2022    HCT 39.2 09/27/2022    MCV 85.8 09/27/2022    MCH 26.7 (L) 09/27/2022    MCHC 31.1 (L) 09/27/2022    RDW 14.0 09/27/2022     09/27/2022    MPV 11.5 09/27/2022    NRBC 0.0 09/27/2022     Lab Results   Component Value Date     09/27/2022    K 4.8 09/27/2022     09/27/2022    CO2 28 09/27/2022    BUN 15 09/27/2022    MG 2.5 (H) 10/15/2020     Lab Results   Component Value Date    AST 14 (L) 07/01/2022    ALT 16 07/01/2022     Lab Results   Component Value Date     09/27/2022     Lab Results   Component Value Date    CHOL 147 07/01/2022    HDL 45 07/01/2022    TRIG 102 07/01/2022    CHOLHDL 3.3 07/01/2022       CARDIAC STUDIES REVIEWED:EKG: .   Stress test  Results for orders placed during the  hospital encounter of 08/31/22    Exercise Stress - EKG    Interpretation Summary    The EKG portion of this study is abnormal but not diagnostic.    The patient reported no chest pain during the stress test.    ST depression II, V6 suggestive of coronary ischemia.    Conclusion:  Abnormal.     echocardiogram  Results for orders placed during the hospital encounter of 08/31/22    Echo    Interpretation Summary  · The left ventricle is normal in size with eccentric hypertrophy and normal systolic function.  · The estimated ejection fraction is 70%.  · Normal left ventricular diastolic function.  · Mild right ventricular enlargement.  · Mild right atrial enlargement.  · Mild mitral regurgitation.  · Mild tricuspid regurgitation.  · Normal central venous pressure (3 mmHg).  · The estimated PA systolic pressure is 30 mmHg.  · Trivial pericardial effusion.     Heart cath  Results for orders placed during the hospital encounter of 09/29/22    Cardiac catheterization    Conclusion    The ejection fraction was calculated to be 75%.    There was hyperdynamic left ventricular systolic function.    The left ventricular end diastolic pressure was normal.    The pre-procedure left ventricular end diastolic pressure was 10.    The estimated blood loss was none.    The coronary arteries were normal..    The procedure log was documented by Documenter: Tianna Adams RN and verified by Jason Ratliff MD.    Date: 9/29/2022  Time: 3:31 PM           ASSESSMENT:   Patient Active Problem List   Diagnosis    Hypertension    Hyperlipidemia    Gastroesophageal reflux disease    Sweats, menopausal    Gingivitis    Pain, dental    Stage 3b chronic kidney disease    Statin intolerance    Chest discomfort    Aortic heart murmur    SOB (shortness of breath)    Family history of premature coronary artery disease    Abnormal results of cardiovascular function studies    Psoriasis    Cellulitis            Problem List Items Addressed This  Visit          Cardiac/Vascular    Hypertension - Primary    Relevant Orders    EKG 12-lead        PLAN:  Orders Placed This Encounter   Procedures    EKG 12-lead     Standing Status:   Future     Number of Occurrences:   1     Standing Expiration Date:   11/3/2023      RTC  1 year

## 2022-11-09 DIAGNOSIS — Z71.89 COMPLEX CARE COORDINATION: ICD-10-CM

## 2022-11-15 ENCOUNTER — OFFICE VISIT (OUTPATIENT)
Dept: DERMATOLOGY | Facility: CLINIC | Age: 62
End: 2022-11-15
Payer: MEDICARE

## 2022-11-15 VITALS — BODY MASS INDEX: 30.27 KG/M2 | WEIGHT: 192.88 LBS | HEIGHT: 67 IN

## 2022-11-15 DIAGNOSIS — L40.9 PSORIASIS: ICD-10-CM

## 2022-11-15 DIAGNOSIS — L28.1 PRURIGO NODULARIS: ICD-10-CM

## 2022-11-15 DIAGNOSIS — L23.5 ALLERGIC DERMATITIS DUE TO OTHER CHEMICAL PRODUCT: Primary | ICD-10-CM

## 2022-11-15 DIAGNOSIS — L20.84 INTRINSIC ATOPIC DERMATITIS: ICD-10-CM

## 2022-11-15 DIAGNOSIS — L40.50 PSORIATIC ARTHRITIS: ICD-10-CM

## 2022-11-15 DIAGNOSIS — L08.9 SKIN INFECTION: ICD-10-CM

## 2022-11-15 PROCEDURE — 87070 CULTURE OTHR SPECIMN AEROBIC: CPT | Mod: ,,, | Performed by: CLINICAL MEDICAL LABORATORY

## 2022-11-15 PROCEDURE — 99214 OFFICE O/P EST MOD 30 MIN: CPT | Mod: ,,, | Performed by: DERMATOLOGY

## 2022-11-15 PROCEDURE — 1160F PR REVIEW ALL MEDS BY PRESCRIBER/CLIN PHARMACIST DOCUMENTED: ICD-10-PCS | Mod: CPTII,,, | Performed by: DERMATOLOGY

## 2022-11-15 PROCEDURE — 87070 CULTURE, WOUND: ICD-10-PCS | Mod: ,,, | Performed by: CLINICAL MEDICAL LABORATORY

## 2022-11-15 PROCEDURE — 4010F PR ACE/ARB THEARPY RXD/TAKEN: ICD-10-PCS | Mod: CPTII,,, | Performed by: DERMATOLOGY

## 2022-11-15 PROCEDURE — 3008F BODY MASS INDEX DOCD: CPT | Mod: CPTII,,, | Performed by: DERMATOLOGY

## 2022-11-15 PROCEDURE — 1160F RVW MEDS BY RX/DR IN RCRD: CPT | Mod: CPTII,,, | Performed by: DERMATOLOGY

## 2022-11-15 PROCEDURE — 1159F PR MEDICATION LIST DOCUMENTED IN MEDICAL RECORD: ICD-10-PCS | Mod: CPTII,,, | Performed by: DERMATOLOGY

## 2022-11-15 PROCEDURE — 1159F MED LIST DOCD IN RCRD: CPT | Mod: CPTII,,, | Performed by: DERMATOLOGY

## 2022-11-15 PROCEDURE — 99214 PR OFFICE/OUTPT VISIT, EST, LEVL IV, 30-39 MIN: ICD-10-PCS | Mod: ,,, | Performed by: DERMATOLOGY

## 2022-11-15 PROCEDURE — 3008F PR BODY MASS INDEX (BMI) DOCUMENTED: ICD-10-PCS | Mod: CPTII,,, | Performed by: DERMATOLOGY

## 2022-11-15 PROCEDURE — 4010F ACE/ARB THERAPY RXD/TAKEN: CPT | Mod: CPTII,,, | Performed by: DERMATOLOGY

## 2022-11-15 NOTE — PROGRESS NOTES
[facility]   Tatum Perry MD    Patient Name: Janene Castillo  Patient YOB: 1960   Date of Service: 11/15/22    CC: Follow-up Allergic Contact Dermatitis    HPI: Janene Castillo is a 62 y.o. female here today for follow-up of ACD, psoriasis, psoriatic arthritis and atopic dermatitis last seen 10/26/2022.  Previous treatments include dupixent, cosentyx, stelara and skirizi.  Overall, the above problems are worse.  Treatment plan was not followed as directed.  She is no longer taking dupixent due to muscle cramps and joint pain.    Past Medical History:   Diagnosis Date    Allergic contact dermatitis     balsam of peru, bacitracin, fragrance, methyldibromo glutaronite    Anxiety     Chronic pain     HTN (hypertension)     Hyperlipidemia     Plaque psoriasis     Prurigo nodularis     Psoriatic arthritis     Stage 3b chronic kidney disease      Past Surgical History:   Procedure Laterality Date    ANGIOGRAM, CORONARY, WITH LEFT HEART CATHETERIZATION N/A 2022    Procedure: Angiogram, Coronary, with Left Heart Cath;  Surgeon: Jason Ratliff MD;  Location: [facility] CATH LAB;  Service: Cardiology;  Laterality: N/A;     SECTION       Review of patient's allergies indicates:   Allergen Reactions    Bacitracin Dermatitis    Codeine sulfate     Codeine Rash     Rash     Methyldibromoglutaronitrile Dermatitis    Permethrin Rash     Rash     Sulfa (sulfonamide antibiotics) Other (See Comments) and Rash     unknown       Current Outpatient Medications:     amLODIPine (NORVASC) 2.5 MG tablet, Take 1 tablet by mouth once daily, Disp: 90 tablet, Rfl: 0    benazepriL (LOTENSIN) 20 MG tablet, Take 1 tablet (20 mg total) by mouth once daily., Disp: 90 tablet, Rfl: 0    buPROPion (WELLBUTRIN) 100 MG tablet, Take 1 tablet (100 mg total) by mouth 2 (two) times daily., Disp: 180 tablet, Rfl: 1    calcipotriene-betamethasone (TACLONEX) ointment, Apply to AA on body BID, tapering with improvement  (Patient not taking: Reported on 11/3/2022), Disp: 60 g, Rfl: 2    calcium carbonate (OS-CAIN) 600 mg calcium (1,500 mg) Tab, Take 600 mg by mouth once daily., Disp: , Rfl:     doxycycline (VIBRAMYCIN) 100 MG Cap, Take one capsule with food twice daily. DO NOT TAKE WITH DAIRY. Wear sunscreen while taking., Disp: 20 capsule, Rfl: 0    DUPIXENT SYRINGE 300 mg/2 mL Syrg, Inject 300 mg into the skin every 14 (fourteen) days., Disp: , Rfl:     ezetimibe (ZETIA) 10 mg tablet, TAKE 1 TABLET BY MOUTH ONCE DAILY IN THE EVENING, Disp: 90 tablet, Rfl: 1    famotidine (PEPCID) 20 MG tablet, Take 1 tablet (20 mg total) by mouth 2 (two) times daily., Disp: 180 tablet, Rfl: 0    hydrOXYzine pamoate (VISTARIL) 25 MG Cap, Take 1 capsule (25 mg total) by mouth every 8 (eight) hours as needed (itching)., Disp: 60 capsule, Rfl: 0    mupirocin (BACTROBAN) 2 % ointment, APPLY  OINTMENT TOPICALLY THREE TIMES DAILY (Patient not taking: Reported on 11/3/2022), Disp: 90 g, Rfl: 1    oxyCODONE-acetaminophen (PERCOCET)  mg per tablet, Take 1 tablet by mouth every 4 (four) hours as needed for Pain., Disp: 2 tablet, Rfl: 0    SKYRIZI 150 mg/mL Syrg, INJECT 1ml SUBCUTANEOUSLY EVERY 12 WEEKS, Disp: , Rfl:     tiZANidine (ZANAFLEX) 4 MG tablet, Take 1 tablet (4 mg total) by mouth 2 (two) times daily., Disp: 90 tablet, Rfl: 0    valACYclovir (VALTREX) 1000 MG tablet, TAKE 2 TABLETS BY MOUTH TWICE DAILY FOR COLD SYMPTOMS, Disp: 4 tablet, Rfl: 0    varenicline (CHANTIX) 1 mg Tab, Take 1 tablet (1 mg total) by mouth 2 (two) times daily., Disp: 56 tablet, Rfl: 0    zinc gluconate 50 mg tablet, Take 50 mg by mouth once daily., Disp: , Rfl:     ROS: A focused review of systems was obtained and negative.     Exam: A focused skin exam was performed. All areas examined were normal except as mentioned in the assessment and plan below.  General Appearance of the patient is well developed and well nourished.  Orientation: alert and oriented x 3.  Mood and  affect: pleasant.    Assessment:   The primary encounter diagnosis was Allergic dermatitis due to other chemical product. Diagnoses of Skin infection, Intrinsic atopic dermatitis, Prurigo nodularis, Psoriatic arthritis, and Psoriasis were also pertinent to this visit.    Plan:      Allergic Contact Dermatitis (L23.89)  - Well demarcated, geometric eczematous patches  Status: stable    Plan: Counseling.  I counseled the patient regarding the following:  Skin care: Patient should use hypoallergenic products such as unscented soaps. Eliminate exposure to all new  cosmetics, fragrances, hair products, nail products shampoos, scented soaps, plants, metals and sunscreens.  Expectations: Allergic contact dermatitis can persist for several weeks before it fully resolves. Sometimes, patch  testing is necessary if reactions persist or if the patient is in contact with several potential allergens.  Contact office if: Allergic contact dermatitis worsens or fails to improve despite several weeks of treatment.    - continue to follow CAMP list    Atopic Dermatitis (L20.89)  - eczematous plaques and papules located on the legs with lichenification    Status: Inadequately controlled     Plan: Counseling.  I counseled the patient regarding the following:  Skin care: Patient should bathe using lukewarm water with a mild cleanser and moisturize immediately after.  Emollients should be applied at least 2-3 times daily. Avoid scented detergents or fabric softeners. Keep  fingernails short. Avoid excessive hand washing.  Expectations: The patient is aware that eczema is chronic in nature and can improve with moisturizers and topical  steroids and worsen with stress, scented soaps, detergents, scratching, dry skin, changes in weather and skin  infections.  Contact office if: Eczema worsens or fails to improve despite several weeks of treatment; patient develops skin  infections (such as: yellow honey colored crusts or cold sores).    -  will start rinvoq as she is unresponsive to topical steroids and dupixent.  I have discussed this recommendation with Dr. Eugene who agrees as this will be a good option for her psoriatic arthritis     Prurigo Nodularis  - erythematous nodules with central erosions located on the legs     Plan: Counseling  I counseled the patient regarding the following:  Skin care: Recommend trimming nails short, anti-itch lotions such as Sarna, topical steroids and antihistamines.  Expectations: Prurigo Nodularis is a self-inflicted lesion that results from picking or rubbing the same spot of skin over and over again. If the itch-scratch cycle is broken, the lesions will resolve.  Contact office if: Prurigo Nodularis worsens, or if itching is accompanied by constitutional symptoms.    - will obtain culture to r/o secondary infection    Psoriatic Arthritis  - Psoriasiform plaques with micaceous scale  Status: Inadequately controlled     Plan: Counseling.  I counseled the patient regarding the following:  Instructions: Systemic medications are the treatment of choice for psoriatic arthritis. Treatment options include  anti-TNF therapy and methotrexate.  Expectations: Psoriatic arthritis is a type of inflammatory arthritis which occurs in conjunction with psoriasis.  Approximately 5% of psoriasis patients develop psoriatic arthritis. Psoriatic arthritis is chronic in nature with  periods of remissions and flares. Flares can be triggered by stress, infections (group A strep), certain medications  and alcohol. Psoriatic arthritis requires systemic medication for adequate treatment.  Contact office if: Your psoriatic arthritis worsens, or fails to improve despite several months of treatment.    Plan: Discussion of Management with External Provider: Dr. Eugene  Discussion Details: per above        Follow up in about 3 months (around 2/15/2023).    Tatum Perry MD

## 2022-11-15 NOTE — Clinical Note
I tried to call ms iyer but only got her voicemail.  Will you call her when you have time and let her know I spoke with Dr. Eugene and we both would like to try rinvoq?  It is in the same category as cibiqo (works for her skin) but Dr. Eugene thinks it will be better for her joints.  Will you ask her to follow up with him. He has samples.

## 2022-11-16 ENCOUNTER — IMMUNIZATION (OUTPATIENT)
Dept: FAMILY MEDICINE | Facility: CLINIC | Age: 62
End: 2022-11-16
Payer: MEDICARE

## 2022-11-16 DIAGNOSIS — Z23 NEED FOR VACCINATION: Primary | ICD-10-CM

## 2022-11-16 PROCEDURE — 91313 COVID-19, MRNA, LNP-S, BIVALENT BOOSTER, PF, 50 MCG/0.5 ML: CPT | Mod: ,,, | Performed by: NURSE PRACTITIONER

## 2022-11-16 PROCEDURE — 91313 COVID-19, MRNA, LNP-S, BIVALENT BOOSTER, PF, 50 MCG/0.5 ML: ICD-10-PCS | Mod: ,,, | Performed by: NURSE PRACTITIONER

## 2022-11-16 PROCEDURE — 0134A COVID-19, MRNA, LNP-S, BIVALENT BOOSTER, PF, 50 MCG/0.5 ML: ICD-10-PCS | Mod: ,,, | Performed by: NURSE PRACTITIONER

## 2022-11-16 PROCEDURE — 0134A COVID-19, MRNA, LNP-S, BIVALENT BOOSTER, PF, 50 MCG/0.5 ML: CPT | Mod: ,,, | Performed by: NURSE PRACTITIONER

## 2022-11-17 LAB — MICROORGANISM SPEC CULT: NORMAL

## 2022-11-29 ENCOUNTER — TELEPHONE (OUTPATIENT)
Dept: DERMATOLOGY | Facility: CLINIC | Age: 62
End: 2022-11-29
Payer: MEDICARE

## 2022-11-29 RX ORDER — PREDNISONE 10 MG/1
TABLET ORAL
Qty: 36 TABLET | Refills: 0 | Status: SHIPPED | OUTPATIENT
Start: 2022-11-29 | End: 2023-01-30

## 2022-11-29 NOTE — TELEPHONE ENCOUNTER
Spoke with patient about rash which is consistent with doxycycline photoeruption.  Will start prednisone taper x2 weeks but pt will no start rinvoq until after prednisone taper to minimize her risk of immunosuppression.  She will monitor her BP while on prednisone.     Prednisone Counseling: I discussed with the patient the risks of prolonged use of prednisone including but not limited to weight gain, insomnia, osteoporosis, mood changes, diabetes, susceptibility to infection, glaucoma and high blood pressure. In cases where prednisone use is prolonged, patients should be monitored with blood pressure checks, serum glucose levels and an eye exam. Additionally, the patient may need to be placed on GI prophylaxis, PCP prophylaxis, and calcium and vitamin D supplementation and/or a bisphosphonate. The patient verbalized understanding of the proper use and the possible adverse effects of prednisone. All of the patient's questions and concerns were addressed.    ----- Message from Abelino Mckeon RN sent at 11/28/2022  3:57 PM CST -----  Mrs. Castillo called stating she is still itching and bleeding and she requested to have prednisone called in. I told her you were out today, but I would send you a message and call her back tomorrow.

## 2022-12-14 DIAGNOSIS — F17.200 SMOKER: ICD-10-CM

## 2022-12-14 RX ORDER — VARENICLINE TARTRATE 1 MG/1
1 TABLET, FILM COATED ORAL 2 TIMES DAILY
Qty: 60 TABLET | Refills: 1 | Status: SHIPPED | OUTPATIENT
Start: 2022-12-14 | End: 2023-01-30 | Stop reason: SDUPTHER

## 2022-12-14 NOTE — TELEPHONE ENCOUNTER
----- Message from Bhumi Tatum sent at 12/14/2022 11:27 AM CST -----  Needs med refills on her chantix called into [address] [facility].      Thank you

## 2023-01-26 ENCOUNTER — OFFICE VISIT (OUTPATIENT)
Dept: DERMATOLOGY | Facility: CLINIC | Age: 63
End: 2023-01-26
Payer: MEDICARE

## 2023-01-26 DIAGNOSIS — L28.1 PRURIGO NODULARIS: ICD-10-CM

## 2023-01-26 DIAGNOSIS — L20.84 INTRINSIC ECZEMA: Primary | ICD-10-CM

## 2023-01-26 DIAGNOSIS — L40.50 PSORIATIC ARTHRITIS: ICD-10-CM

## 2023-01-26 DIAGNOSIS — L40.9 PSORIASIS: ICD-10-CM

## 2023-01-26 DIAGNOSIS — L23.89 ALLERGIC CONTACT DERMATITIS DUE TO OTHER AGENTS: ICD-10-CM

## 2023-01-26 PROCEDURE — 99214 OFFICE O/P EST MOD 30 MIN: CPT | Mod: ,,, | Performed by: DERMATOLOGY

## 2023-01-26 PROCEDURE — 1160F RVW MEDS BY RX/DR IN RCRD: CPT | Mod: CPTII,,, | Performed by: DERMATOLOGY

## 2023-01-26 PROCEDURE — 99214 PR OFFICE/OUTPT VISIT, EST, LEVL IV, 30-39 MIN: ICD-10-PCS | Mod: ,,, | Performed by: DERMATOLOGY

## 2023-01-26 PROCEDURE — 1160F PR REVIEW ALL MEDS BY PRESCRIBER/CLIN PHARMACIST DOCUMENTED: ICD-10-PCS | Mod: CPTII,,, | Performed by: DERMATOLOGY

## 2023-01-26 PROCEDURE — 1159F MED LIST DOCD IN RCRD: CPT | Mod: CPTII,,, | Performed by: DERMATOLOGY

## 2023-01-26 PROCEDURE — 1159F PR MEDICATION LIST DOCUMENTED IN MEDICAL RECORD: ICD-10-PCS | Mod: CPTII,,, | Performed by: DERMATOLOGY

## 2023-01-30 ENCOUNTER — OFFICE VISIT (OUTPATIENT)
Dept: FAMILY MEDICINE | Facility: CLINIC | Age: 63
End: 2023-01-30
Payer: MEDICARE

## 2023-01-30 VITALS
WEIGHT: 192 LBS | HEIGHT: 67 IN | OXYGEN SATURATION: 99 % | BODY MASS INDEX: 30.13 KG/M2 | HEART RATE: 69 BPM | DIASTOLIC BLOOD PRESSURE: 70 MMHG | SYSTOLIC BLOOD PRESSURE: 114 MMHG | RESPIRATION RATE: 18 BRPM

## 2023-01-30 DIAGNOSIS — I10 HYPERTENSION, UNSPECIFIED TYPE: Primary | ICD-10-CM

## 2023-01-30 DIAGNOSIS — F32.A DEPRESSION, UNSPECIFIED DEPRESSION TYPE: ICD-10-CM

## 2023-01-30 DIAGNOSIS — K21.9 GASTROESOPHAGEAL REFLUX DISEASE, UNSPECIFIED WHETHER ESOPHAGITIS PRESENT: ICD-10-CM

## 2023-01-30 DIAGNOSIS — L29.9 PRURITUS: ICD-10-CM

## 2023-01-30 DIAGNOSIS — F17.200 SMOKER: ICD-10-CM

## 2023-01-30 PROCEDURE — 99213 PR OFFICE/OUTPT VISIT, EST, LEVL III, 20-29 MIN: ICD-10-PCS | Mod: ,,, | Performed by: FAMILY MEDICINE

## 2023-01-30 PROCEDURE — 3074F PR MOST RECENT SYSTOLIC BLOOD PRESSURE < 130 MM HG: ICD-10-PCS | Mod: ,,, | Performed by: FAMILY MEDICINE

## 2023-01-30 PROCEDURE — 1160F RVW MEDS BY RX/DR IN RCRD: CPT | Mod: ,,, | Performed by: FAMILY MEDICINE

## 2023-01-30 PROCEDURE — 99213 OFFICE O/P EST LOW 20 MIN: CPT | Mod: ,,, | Performed by: FAMILY MEDICINE

## 2023-01-30 PROCEDURE — 1160F PR REVIEW ALL MEDS BY PRESCRIBER/CLIN PHARMACIST DOCUMENTED: ICD-10-PCS | Mod: ,,, | Performed by: FAMILY MEDICINE

## 2023-01-30 PROCEDURE — 1159F MED LIST DOCD IN RCRD: CPT | Mod: ,,, | Performed by: FAMILY MEDICINE

## 2023-01-30 PROCEDURE — 1159F PR MEDICATION LIST DOCUMENTED IN MEDICAL RECORD: ICD-10-PCS | Mod: ,,, | Performed by: FAMILY MEDICINE

## 2023-01-30 PROCEDURE — 3008F PR BODY MASS INDEX (BMI) DOCUMENTED: ICD-10-PCS | Mod: ,,, | Performed by: FAMILY MEDICINE

## 2023-01-30 PROCEDURE — 3008F BODY MASS INDEX DOCD: CPT | Mod: ,,, | Performed by: FAMILY MEDICINE

## 2023-01-30 PROCEDURE — 3078F DIAST BP <80 MM HG: CPT | Mod: ,,, | Performed by: FAMILY MEDICINE

## 2023-01-30 PROCEDURE — 3078F PR MOST RECENT DIASTOLIC BLOOD PRESSURE < 80 MM HG: ICD-10-PCS | Mod: ,,, | Performed by: FAMILY MEDICINE

## 2023-01-30 PROCEDURE — 3074F SYST BP LT 130 MM HG: CPT | Mod: ,,, | Performed by: FAMILY MEDICINE

## 2023-01-30 RX ORDER — VARENICLINE TARTRATE 1 MG/1
1 TABLET, FILM COATED ORAL 2 TIMES DAILY
Qty: 60 TABLET | Refills: 1 | Status: SHIPPED | OUTPATIENT
Start: 2023-01-30 | End: 2023-03-31

## 2023-01-30 RX ORDER — HYDROXYZINE PAMOATE 25 MG/1
CAPSULE ORAL
Qty: 60 CAPSULE | Refills: 0 | Status: SHIPPED | OUTPATIENT
Start: 2023-01-30 | End: 2023-03-30

## 2023-01-30 RX ORDER — BUPROPION HYDROCHLORIDE 100 MG/1
100 TABLET ORAL 2 TIMES DAILY
Qty: 180 TABLET | Refills: 1 | Status: SHIPPED | OUTPATIENT
Start: 2023-01-30 | End: 2023-06-30 | Stop reason: SDUPTHER

## 2023-01-30 RX ORDER — FAMOTIDINE 20 MG/1
20 TABLET, FILM COATED ORAL 2 TIMES DAILY
Qty: 180 TABLET | Refills: 1 | Status: SHIPPED | OUTPATIENT
Start: 2023-01-30 | End: 2024-01-02 | Stop reason: SDUPTHER

## 2023-01-30 RX ORDER — AMLODIPINE BESYLATE 2.5 MG/1
2.5 TABLET ORAL DAILY
Qty: 90 TABLET | Refills: 1 | Status: SHIPPED | OUTPATIENT
Start: 2023-01-30 | End: 2023-06-30 | Stop reason: SDUPTHER

## 2023-01-30 NOTE — PROGRESS NOTES
Janene Castillo is a 62 y.o. female seen today for follow-up on her hypertension and hyperlipidemia.  She is not fasting today but will return to the lab fasting the next week or so.  She denies any chest pain or shortness of breath and is up-to-date on her mammogram and colon cancer screening and flu shot.      Past Medical History:   Diagnosis Date    Allergic contact dermatitis     balsam of peru, bacitracin, fragrance, methyldibromo glutaronite    Anxiety     Chronic pain     HTN (hypertension)     Hyperlipidemia     Plaque psoriasis     Prurigo nodularis     Psoriatic arthritis     Stage 3b chronic kidney disease      Family History   Problem Relation Age of Onset    Hypertension Mother     Heart disease Father     Heart attack Father     Heart failure Father     Pacemaker/defibrilator Father     Melanoma Neg Hx      Current Outpatient Medications on File Prior to Visit   Medication Sig Dispense Refill    benazepriL (LOTENSIN) 20 MG tablet Take 1 tablet (20 mg total) by mouth once daily. 90 tablet 0    calcium carbonate (OS-CAIN) 600 mg calcium (1,500 mg) Tab Take 600 mg by mouth once daily.      DUPIXENT SYRINGE 300 mg/2 mL Syrg Inject 300 mg into the skin every 14 (fourteen) days.      ezetimibe (ZETIA) 10 mg tablet TAKE 1 TABLET BY MOUTH ONCE DAILY IN THE EVENING 90 tablet 0    mupirocin (BACTROBAN) 2 % ointment APPLY  OINTMENT TOPICALLY THREE TIMES DAILY 90 g 1    oxyCODONE-acetaminophen (PERCOCET)  mg per tablet Take 1 tablet by mouth every 4 (four) hours as needed for Pain. 2 tablet 0    tiZANidine (ZANAFLEX) 4 MG tablet Take 1 tablet by mouth twice daily 90 tablet 0    valACYclovir (VALTREX) 1000 MG tablet TAKE 2 TABLETS BY MOUTH TWICE DAILY FOR COLD SYMPTOMS 4 tablet 0    zinc gluconate 50 mg tablet Take 50 mg by mouth once daily.      [DISCONTINUED] amLODIPine (NORVASC) 2.5 MG tablet Take 1 tablet by mouth once daily 90 tablet 0    [DISCONTINUED] buPROPion (WELLBUTRIN) 100 MG tablet Take 1 tablet  (100 mg total) by mouth 2 (two) times daily. 180 tablet 1    [DISCONTINUED] famotidine (PEPCID) 20 MG tablet Take 1 tablet by mouth twice daily 180 tablet 0    [DISCONTINUED] hydrOXYzine pamoate (VISTARIL) 25 MG Cap TAKE 1 CAPSULE BY MOUTH EVERY 8 HOURS AS NEEDED 60 capsule 0    [DISCONTINUED] varenicline (CHANTIX) 1 mg Tab Take 1 tablet (1 mg total) by mouth 2 (two) times daily. 60 tablet 1    predniSONE (DELTASONE) 10 MG tablet Take 6 tab po daily x1 day, 4 tab po daily x2 days, 3 tabs po daily x3 days, 2 tabs po daily x4 days, 1 tab po daily x5 days (Patient not taking: Reported on 1/30/2023) 36 tablet 0     No current facility-administered medications on file prior to visit.     Immunization History   Administered Date(s) Administered    COVID-19 MRNA, LN-S PF (MODERNA HALF 0.25 ML DOSE) 04/26/2022    COVID-19, MRNA, LN-S, PF (MODERNA FULL 0.5 ML DOSE) 06/22/2021, 08/06/2021    COVID-19, mRNA, LNP-S, bivalent booster, PF (Moderna Omicron) 11/16/2022       Review of Systems   Constitutional:  Negative for fever, malaise/fatigue and weight loss.   Respiratory:  Negative for shortness of breath.    Cardiovascular:  Negative for chest pain and palpitations.   Gastrointestinal:  Negative for nausea and vomiting.   Musculoskeletal:  Positive for joint pain and myalgias.   Psychiatric/Behavioral:  Negative for depression.       Vitals:    01/30/23 0856   BP: 114/70   Pulse: 69   Resp: 18       Physical Exam  Vitals reviewed.   Constitutional:       Appearance: Normal appearance.   HENT:      Head: Normocephalic.   Eyes:      Extraocular Movements: Extraocular movements intact.      Conjunctiva/sclera: Conjunctivae normal.      Pupils: Pupils are equal, round, and reactive to light.   Neck:      Thyroid: No thyroid mass or thyromegaly.   Cardiovascular:      Rate and Rhythm: Normal rate and regular rhythm.      Heart sounds: Normal heart sounds. No murmur heard.    No gallop.   Pulmonary:      Effort: Pulmonary effort  is normal. No respiratory distress.      Breath sounds: Normal breath sounds. No wheezing or rales.   Skin:     General: Skin is warm and dry.      Coloration: Skin is not jaundiced or pale.   Neurological:      Mental Status: She is alert.   Psychiatric:         Mood and Affect: Mood normal.         Behavior: Behavior normal.         Thought Content: Thought content normal.         Judgment: Judgment normal.        Assessment and Plan  Hypertension, unspecified type  -     amLODIPine (NORVASC) 2.5 MG tablet; Take 1 tablet (2.5 mg total) by mouth once daily.  Dispense: 90 tablet; Refill: 1    Depression, unspecified depression type  -     buPROPion (WELLBUTRIN) 100 MG tablet; Take 1 tablet (100 mg total) by mouth 2 (two) times daily.  Dispense: 180 tablet; Refill: 1    Smoker  -     varenicline (CHANTIX) 1 mg Tab; Take 1 tablet (1 mg total) by mouth 2 (two) times daily.  Dispense: 60 tablet; Refill: 1    Pruritus  -     hydrOXYzine pamoate (VISTARIL) 25 MG Cap; TAKE 1 CAPSULE BY MOUTH EVERY 8 HOURS AS NEEDED  Dispense: 60 capsule; Refill: 0    Gastroesophageal reflux disease, unspecified whether esophagitis present  -     famotidine (PEPCID) 20 MG tablet; Take 1 tablet (20 mg total) by mouth 2 (two) times daily.  Dispense: 180 tablet; Refill: 1            Return to clinic as needed once her lab work is in.    Health Maintenance Topics with due status: Not Due       Topic Last Completion Date    Colorectal Cancer Screening 12/22/2020    Lipid Panel 07/01/2022    Mammogram 08/22/2022

## 2023-03-02 DIAGNOSIS — Z79.899 OTHER LONG TERM (CURRENT) DRUG THERAPY: ICD-10-CM

## 2023-03-02 DIAGNOSIS — E78.5 HYPERLIPIDEMIA, UNSPECIFIED HYPERLIPIDEMIA TYPE: ICD-10-CM

## 2023-03-02 DIAGNOSIS — I10 HYPERTENSION, UNSPECIFIED TYPE: Primary | ICD-10-CM

## 2023-03-02 DIAGNOSIS — I95.9 HYPOTENSION, UNSPECIFIED HYPOTENSION TYPE: ICD-10-CM

## 2023-03-03 ENCOUNTER — TELEPHONE (OUTPATIENT)
Dept: FAMILY MEDICINE | Facility: CLINIC | Age: 63
End: 2023-03-03
Payer: MEDICARE

## 2023-03-03 NOTE — TELEPHONE ENCOUNTER
----- Message from Asael Huizar MD sent at 3/2/2023 12:13 PM CST -----  Office visit for LDL.  Her renal function is improving.

## 2023-03-03 NOTE — LETTER
March 3, 2023    Janene Castillo  [address] [place] [address]             [facility]  [address] [place] [address]  Phone: [phone]  Fax: [phone] Dear Ms. Castillo:    Please contact the clinic to schedule a follow up to discuss your recent lab results with Dr Huizar.         Sincerely,      Zena Wetzel lpn

## 2023-03-17 ENCOUNTER — HOSPITAL ENCOUNTER (OUTPATIENT)
Dept: RADIOLOGY | Facility: HOSPITAL | Age: 63
Discharge: HOME | End: 2023-03-17
Attending: ANESTHESIOLOGY
Payer: MEDICARE

## 2023-03-17 DIAGNOSIS — M25.532 LEFT WRIST PAIN: ICD-10-CM

## 2023-03-17 DIAGNOSIS — M25.559 PAIN IN JOINT, PELVIC REGION AND THIGH: ICD-10-CM

## 2023-03-17 PROCEDURE — 73110 X-RAY EXAM OF WRIST: CPT | Mod: TC,LT

## 2023-03-17 PROCEDURE — 72190 X-RAY EXAM OF PELVIS: CPT | Mod: 26,59,, | Performed by: STUDENT IN AN ORGANIZED HEALTH CARE EDUCATION/TRAINING PROGRAM

## 2023-03-17 PROCEDURE — 73130 X-RAY EXAM OF HAND: CPT | Mod: TC,LT

## 2023-03-17 PROCEDURE — 73110 X-RAY EXAM OF WRIST: CPT | Mod: 26,LT,, | Performed by: STUDENT IN AN ORGANIZED HEALTH CARE EDUCATION/TRAINING PROGRAM

## 2023-03-17 PROCEDURE — 73522 XR HIP 3 OR 4 VIEWS BILATERAL: ICD-10-PCS | Mod: 26,,, | Performed by: STUDENT IN AN ORGANIZED HEALTH CARE EDUCATION/TRAINING PROGRAM

## 2023-03-17 PROCEDURE — 72190 XR PELVIS COMPLETE MIN 3 VIEWS: ICD-10-PCS | Mod: 26,59,, | Performed by: STUDENT IN AN ORGANIZED HEALTH CARE EDUCATION/TRAINING PROGRAM

## 2023-03-17 PROCEDURE — 72190 X-RAY EXAM OF PELVIS: CPT | Mod: TC,59

## 2023-03-17 PROCEDURE — 73522 X-RAY EXAM HIPS BI 3-4 VIEWS: CPT | Mod: TC

## 2023-03-17 PROCEDURE — 73522 X-RAY EXAM HIPS BI 3-4 VIEWS: CPT | Mod: 26,,, | Performed by: STUDENT IN AN ORGANIZED HEALTH CARE EDUCATION/TRAINING PROGRAM

## 2023-03-17 PROCEDURE — 73130 X-RAY EXAM OF HAND: CPT | Mod: 26,LT,, | Performed by: STUDENT IN AN ORGANIZED HEALTH CARE EDUCATION/TRAINING PROGRAM

## 2023-03-17 PROCEDURE — 73110 XR WRIST COMPLETE 3 VIEWS LEFT: ICD-10-PCS | Mod: 26,LT,, | Performed by: STUDENT IN AN ORGANIZED HEALTH CARE EDUCATION/TRAINING PROGRAM

## 2023-03-17 PROCEDURE — 73130 XR HAND COMPLETE 3 VIEW LEFT: ICD-10-PCS | Mod: 26,LT,, | Performed by: STUDENT IN AN ORGANIZED HEALTH CARE EDUCATION/TRAINING PROGRAM

## 2023-03-30 ENCOUNTER — OFFICE VISIT (OUTPATIENT)
Dept: FAMILY MEDICINE | Facility: CLINIC | Age: 63
End: 2023-03-30
Payer: MEDICARE

## 2023-03-30 VITALS
BODY MASS INDEX: 29.19 KG/M2 | DIASTOLIC BLOOD PRESSURE: 72 MMHG | SYSTOLIC BLOOD PRESSURE: 126 MMHG | WEIGHT: 186 LBS | RESPIRATION RATE: 18 BRPM | TEMPERATURE: 98 F | HEART RATE: 83 BPM | OXYGEN SATURATION: 99 % | HEIGHT: 67 IN

## 2023-03-30 DIAGNOSIS — I10 HYPERTENSION, UNSPECIFIED TYPE: ICD-10-CM

## 2023-03-30 DIAGNOSIS — B00.1 HERPES LABIALIS: ICD-10-CM

## 2023-03-30 DIAGNOSIS — E78.5 HYPERLIPIDEMIA, UNSPECIFIED HYPERLIPIDEMIA TYPE: Primary | ICD-10-CM

## 2023-03-30 PROCEDURE — 1160F PR REVIEW ALL MEDS BY PRESCRIBER/CLIN PHARMACIST DOCUMENTED: ICD-10-PCS | Mod: ,,, | Performed by: FAMILY MEDICINE

## 2023-03-30 PROCEDURE — 1160F RVW MEDS BY RX/DR IN RCRD: CPT | Mod: ,,, | Performed by: FAMILY MEDICINE

## 2023-03-30 PROCEDURE — 3078F PR MOST RECENT DIASTOLIC BLOOD PRESSURE < 80 MM HG: ICD-10-PCS | Mod: ,,, | Performed by: FAMILY MEDICINE

## 2023-03-30 PROCEDURE — 3008F BODY MASS INDEX DOCD: CPT | Mod: ,,, | Performed by: FAMILY MEDICINE

## 2023-03-30 PROCEDURE — 99214 PR OFFICE/OUTPT VISIT, EST, LEVL IV, 30-39 MIN: ICD-10-PCS | Mod: ,,, | Performed by: FAMILY MEDICINE

## 2023-03-30 PROCEDURE — 3074F SYST BP LT 130 MM HG: CPT | Mod: ,,, | Performed by: FAMILY MEDICINE

## 2023-03-30 PROCEDURE — 3008F PR BODY MASS INDEX (BMI) DOCUMENTED: ICD-10-PCS | Mod: ,,, | Performed by: FAMILY MEDICINE

## 2023-03-30 PROCEDURE — 3074F PR MOST RECENT SYSTOLIC BLOOD PRESSURE < 130 MM HG: ICD-10-PCS | Mod: ,,, | Performed by: FAMILY MEDICINE

## 2023-03-30 PROCEDURE — 1159F MED LIST DOCD IN RCRD: CPT | Mod: ,,, | Performed by: FAMILY MEDICINE

## 2023-03-30 PROCEDURE — 3078F DIAST BP <80 MM HG: CPT | Mod: ,,, | Performed by: FAMILY MEDICINE

## 2023-03-30 PROCEDURE — 99214 OFFICE O/P EST MOD 30 MIN: CPT | Mod: ,,, | Performed by: FAMILY MEDICINE

## 2023-03-30 PROCEDURE — 1159F PR MEDICATION LIST DOCUMENTED IN MEDICAL RECORD: ICD-10-PCS | Mod: ,,, | Performed by: FAMILY MEDICINE

## 2023-03-30 RX ORDER — PITAVASTATIN CALCIUM 2.09 MG/1
1 TABLET, FILM COATED ORAL NIGHTLY
COMMUNITY
End: 2023-09-21

## 2023-03-30 RX ORDER — VALACYCLOVIR HYDROCHLORIDE 1 G/1
TABLET, FILM COATED ORAL
Qty: 4 TABLET | Refills: 11 | Status: SHIPPED | OUTPATIENT
Start: 2023-03-30

## 2023-03-30 RX ORDER — UPADACITINIB 15 MG/1
15 TABLET, EXTENDED RELEASE ORAL DAILY
COMMUNITY
Start: 2023-03-08

## 2023-03-30 NOTE — PROGRESS NOTES
Janene Castillo is a 62 y.o. female seen today for follow-up on recent lab work.  Unfortunately her LDL cholesterol remains elevated and not to goal.  In the past, patient has not tolerated statin medications well and we discussed a trial of Livalo samples.  Patient will start Co Q10 1 week before she begins to samples and take only 1/2 of a 2 mg tablet q.h.s..  She also has recurrent herpes labialis and we discussed the proper use of Valtrex.      Past Medical History:   Diagnosis Date    Allergic contact dermatitis     balsam of peru, bacitracin, fragrance, methyldibromo glutaronite    Anxiety     Chronic pain     HTN (hypertension)     Hyperlipidemia     Plaque psoriasis     Prurigo nodularis     Psoriatic arthritis     Stage 3b chronic kidney disease      Family History   Problem Relation Age of Onset    Hypertension Mother     Heart disease Father     Heart attack Father     Heart failure Father     Pacemaker/defibrilator Father     Melanoma Neg Hx      Current Outpatient Medications on File Prior to Visit   Medication Sig Dispense Refill    amLODIPine (NORVASC) 2.5 MG tablet Take 1 tablet (2.5 mg total) by mouth once daily. 90 tablet 1    benazepriL (LOTENSIN) 20 MG tablet Take 1 tablet (20 mg total) by mouth once daily. 90 tablet 0    buPROPion (WELLBUTRIN) 100 MG tablet Take 1 tablet (100 mg total) by mouth 2 (two) times daily. 180 tablet 1    calcium carbonate (OS-CAIN) 600 mg calcium (1,500 mg) Tab Take 600 mg by mouth once daily.      ezetimibe (ZETIA) 10 mg tablet TAKE 1 TABLET BY MOUTH ONCE DAILY IN THE EVENING 90 tablet 0    famotidine (PEPCID) 20 MG tablet Take 1 tablet (20 mg total) by mouth 2 (two) times daily. 180 tablet 1    hydrOXYzine pamoate (VISTARIL) 25 MG Cap TAKE 1 CAPSULE BY MOUTH EVERY 8 HOURS AS NEEDED (Patient taking differently: TAKE 1 CAPSULE BY MOUTH EVERY 12 HOURS AS NEEDED) 60 capsule 0    oxyCODONE-acetaminophen (PERCOCET)  mg per tablet Take 1 tablet by mouth every 4 (four)  hours as needed for Pain. 2 tablet 0    pitavastatin calcium (LIVALO) 2 mg Tab tablet Take 1 mg by mouth every evening.      RINVOQ 15 mg 24 hr tablet Take 15 mg by mouth once daily.      tiZANidine (ZANAFLEX) 4 MG tablet Take 1 tablet by mouth twice daily 90 tablet 0    varenicline (CHANTIX) 1 mg Tab Take 1 tablet (1 mg total) by mouth 2 (two) times daily. 60 tablet 1    zinc gluconate 50 mg tablet Take 50 mg by mouth once daily.      [DISCONTINUED] valACYclovir (VALTREX) 1000 MG tablet Take 4 p.o. at once as needed for fever blister 4 tablet 3    DUPIXENT SYRINGE 300 mg/2 mL Syrg Inject 300 mg into the skin every 14 (fourteen) days.      mupirocin (BACTROBAN) 2 % ointment APPLY  OINTMENT TOPICALLY THREE TIMES DAILY (Patient not taking: Reported on 3/30/2023) 90 g 1     No current facility-administered medications on file prior to visit.     Immunization History   Administered Date(s) Administered    COVID-19 MRNA, LN-S PF (MODERNA HALF 0.25 ML DOSE) 04/26/2022    COVID-19, MRNA, LN-S, PF (MODERNA FULL 0.5 ML DOSE) 06/22/2021, 08/06/2021    COVID-19, mRNA, LNP-S, bivalent booster, PF (Moderna Omicron) 11/16/2022       Review of Systems   Constitutional:  Negative for fever, malaise/fatigue and weight loss.   Respiratory:  Negative for shortness of breath.    Cardiovascular:  Negative for chest pain and palpitations.   Gastrointestinal:  Negative for nausea and vomiting.   Psychiatric/Behavioral:  Negative for depression.       Vitals:    03/30/23 1033   BP: 126/72   Pulse: 83   Resp: 18   Temp: 98.2 °F (36.8 °C)       Physical Exam  Vitals reviewed.   Constitutional:       Appearance: Normal appearance.   HENT:      Head: Normocephalic.   Eyes:      Extraocular Movements: Extraocular movements intact.      Conjunctiva/sclera: Conjunctivae normal.      Pupils: Pupils are equal, round, and reactive to light.   Neck:      Thyroid: No thyroid mass or thyromegaly.   Cardiovascular:      Rate and Rhythm: Normal rate and  regular rhythm.      Heart sounds: Normal heart sounds. No murmur heard.    No gallop.   Pulmonary:      Effort: Pulmonary effort is normal. No respiratory distress.      Breath sounds: Normal breath sounds. No wheezing or rales.   Skin:     General: Skin is warm and dry.      Coloration: Skin is not jaundiced or pale.   Neurological:      Mental Status: She is alert.   Psychiatric:         Mood and Affect: Mood normal.         Behavior: Behavior normal.         Thought Content: Thought content normal.         Judgment: Judgment normal.        Assessment and Plan  Hyperlipidemia, unspecified hyperlipidemia type  -     Lipid Panel; Future; Expected date: 06/30/2023    Herpes labialis  -     valACYclovir (VALTREX) 1000 MG tablet; Take 2 tablets b.i.d. x1 day as needed for a cold sore  Dispense: 4 tablet; Refill: 11    Hypertension, unspecified type  -     CBC Auto Differential; Future; Expected date: 06/30/2023  -     Comprehensive Metabolic Panel; Future; Expected date: 06/30/2023            Return to clinic in 3 months for follow-up on her lipids and then an office visit or as needed.  I have asked her to call the clinic in 2-3 weeks to let us know if she is tolerating the medication.    Health Maintenance Topics with due status: Not Due       Topic Last Completion Date    Colorectal Cancer Screening 12/22/2020    Mammogram 08/22/2022    Lipid Panel 03/02/2023

## 2023-04-10 ENCOUNTER — OFFICE VISIT (OUTPATIENT)
Dept: DERMATOLOGY | Facility: CLINIC | Age: 63
End: 2023-04-10
Payer: MEDICARE

## 2023-04-10 DIAGNOSIS — Z79.899 HIGH RISK MEDICATION USE: ICD-10-CM

## 2023-04-10 DIAGNOSIS — L40.50 PSORIATIC ARTHRITIS: ICD-10-CM

## 2023-04-10 DIAGNOSIS — L23.89 ALLERGIC CONTACT DERMATITIS DUE TO OTHER AGENTS: ICD-10-CM

## 2023-04-10 DIAGNOSIS — L40.9 PSORIASIS: ICD-10-CM

## 2023-04-10 DIAGNOSIS — L20.84 INTRINSIC ECZEMA: Primary | ICD-10-CM

## 2023-04-10 PROCEDURE — 1160F PR REVIEW ALL MEDS BY PRESCRIBER/CLIN PHARMACIST DOCUMENTED: ICD-10-PCS | Mod: CPTII,,, | Performed by: DERMATOLOGY

## 2023-04-10 PROCEDURE — 99214 PR OFFICE/OUTPT VISIT, EST, LEVL IV, 30-39 MIN: ICD-10-PCS | Mod: ,,, | Performed by: DERMATOLOGY

## 2023-04-10 PROCEDURE — 1159F MED LIST DOCD IN RCRD: CPT | Mod: CPTII,,, | Performed by: DERMATOLOGY

## 2023-04-10 PROCEDURE — 1160F RVW MEDS BY RX/DR IN RCRD: CPT | Mod: CPTII,,, | Performed by: DERMATOLOGY

## 2023-04-10 PROCEDURE — 99214 OFFICE O/P EST MOD 30 MIN: CPT | Mod: ,,, | Performed by: DERMATOLOGY

## 2023-04-10 PROCEDURE — 4010F ACE/ARB THERAPY RXD/TAKEN: CPT | Mod: CPTII,,, | Performed by: DERMATOLOGY

## 2023-04-10 PROCEDURE — 1159F PR MEDICATION LIST DOCUMENTED IN MEDICAL RECORD: ICD-10-PCS | Mod: CPTII,,, | Performed by: DERMATOLOGY

## 2023-04-10 PROCEDURE — 4010F PR ACE/ARB THEARPY RXD/TAKEN: ICD-10-PCS | Mod: CPTII,,, | Performed by: DERMATOLOGY

## 2023-04-10 NOTE — PROGRESS NOTES
[facility]   Tatum Perry MD    Patient Name: Janene Castillo  Patient YOB: 1960   Date of Service: 4/10/23    CC: Follow-up Allergic Contact Dermatitis    HPI: Janene Castillo is a 62 y.o. female here today for follow-up of ACD, psoriasis, psoriatic arthritis and atopic dermatitis last seen 2023.  Previous treatments include Dupixent, Cosentyx, Stelara, Skyrizi, and currently Rinvoq. Overall, the above problems are improved. Treatment plan was followed as directed.      Past Medical History:   Diagnosis Date    Allergic contact dermatitis     balsam of peru, bacitracin, fragrance, methyldibromo glutaronite    Anxiety     Chronic pain     HTN (hypertension)     Hyperlipidemia     Plaque psoriasis     Prurigo nodularis     Psoriatic arthritis     Stage 3b chronic kidney disease      Past Surgical History:   Procedure Laterality Date    ANGIOGRAM, CORONARY, WITH LEFT HEART CATHETERIZATION N/A 2022    Procedure: Angiogram, Coronary, with Left Heart Cath;  Surgeon: Jason Ratliff MD;  Location: [email or web address] CATH LAB;  Service: Cardiology;  Laterality: N/A;     SECTION       Review of patient's allergies indicates:   Allergen Reactions    Bacitracin Dermatitis    Codeine sulfate     Codeine Rash     Rash     Methyldibromoglutaronitrile Dermatitis    Permethrin Rash     Rash     Sulfa (sulfonamide antibiotics) Other (See Comments) and Rash     unknown       Current Outpatient Medications:     amLODIPine (NORVASC) 2.5 MG tablet, Take 1 tablet (2.5 mg total) by mouth once daily., Disp: 90 tablet, Rfl: 1    benazepriL (LOTENSIN) 20 MG tablet, Take 1 tablet by mouth once daily, Disp: 90 tablet, Rfl: 0    buPROPion (WELLBUTRIN) 100 MG tablet, Take 1 tablet (100 mg total) by mouth 2 (two) times daily., Disp: 180 tablet, Rfl: 1    calcium carbonate (OS-CAIN) 600 mg calcium (1,500 mg) Tab, Take 600 mg by mouth once daily., Disp: , Rfl:     ezetimibe (ZETIA) 10 mg tablet, TAKE 1 TABLET BY MOUTH  ONCE DAILY IN THE EVENING, Disp: 90 tablet, Rfl: 0    famotidine (PEPCID) 20 MG tablet, Take 1 tablet (20 mg total) by mouth 2 (two) times daily., Disp: 180 tablet, Rfl: 1    oxyCODONE-acetaminophen (PERCOCET)  mg per tablet, Take 1 tablet by mouth every 4 (four) hours as needed for Pain., Disp: 2 tablet, Rfl: 0    pitavastatin calcium (LIVALO) 2 mg Tab tablet, Take 1 mg by mouth every evening., Disp: , Rfl:     RINVOQ 15 mg 24 hr tablet, Take 15 mg by mouth once daily., Disp: , Rfl:     tiZANidine (ZANAFLEX) 4 MG tablet, Take 1 tablet by mouth twice daily, Disp: 90 tablet, Rfl: 0    valACYclovir (VALTREX) 1000 MG tablet, Take 2 tablets b.i.d. x1 day as needed for a cold sore, Disp: 4 tablet, Rfl: 11    varenicline (CHANTIX) 1 mg Tab, Take 1 tablet by mouth twice daily, Disp: 56 tablet, Rfl: 0    zinc gluconate 50 mg tablet, Take 50 mg by mouth once daily., Disp: , Rfl:     ROS: A focused review of systems was obtained and negative.     Exam: A focused skin exam was performed. All areas examined were normal except as mentioned in the assessment and plan below.  General Appearance of the patient is well developed and well nourished.  Orientation: alert and oriented x 3.  Mood and affect: pleasant.    Assessment:   The primary encounter diagnosis was Intrinsic eczema. Diagnoses of Allergic contact dermatitis due to other agents, Psoriatic arthritis, Psoriasis, and High risk medication use were also pertinent to this visit.    Plan:      Allergic Contact Dermatitis (L23.89)  - clear  Status: stable    Plan: Counseling.  I counseled the patient regarding the following:  Skin care: Patient should use hypoallergenic products such as unscented soaps. Eliminate exposure to all new  cosmetics, fragrances, hair products, nail products shampoos, scented soaps, plants, metals and sunscreens.  Expectations: Allergic contact dermatitis can persist for several weeks before it fully resolves. Sometimes, patch  testing is  necessary if reactions persist or if the patient is in contact with several potential allergens.  Contact office if: Allergic contact dermatitis worsens or fails to improve despite several weeks of treatment.    - continue to follow [place] list    Atopic Dermatitis (L20.89)  - clear    Status: stable    Plan: Counseling.  I counseled the patient regarding the following:  Skin care: Patient should bathe using lukewarm water with a mild cleanser and moisturize immediately after.  Emollients should be applied at least 2-3 times daily. Avoid scented detergents or fabric softeners. Keep  fingernails short. Avoid excessive hand washing.  Expectations: The patient is aware that eczema is chronic in nature and can improve with moisturizers and topical  steroids and worsen with stress, scented soaps, detergents, scratching, dry skin, changes in weather and skin  infections.  Contact office if: Eczema worsens or fails to improve despite several weeks of treatment; patient develops skin  infections (such as: yellow honey colored crusts or cold sores).    - continue Rinvoq.  Will prescribe refills while pt is coordinating care with a new rheumatologist since Dr. Eugene is leaving.    Prurigo Nodularis  - clear    Status: Improved    Plan: Counseling  I counseled the patient regarding the following:  Skin care: Recommend trimming nails short, anti-itch lotions such as Sarna, topical steroids and antihistamines.  Expectations: Prurigo Nodularis is a self-inflicted lesion that results from picking or rubbing the same spot of skin over and over again. If the itch-scratch cycle is broken, the lesions will resolve.  Contact office if: Prurigo Nodularis worsens, or if itching is accompanied by constitutional symptoms.      Psoriatic Arthritis  - Psoriasiform plaques with micaceous scale  Status: Improved     Plan: Counseling.  I counseled the patient regarding the following:  Instructions: Systemic medications are the treatment of choice  for psoriatic arthritis. Treatment options include  anti-TNF therapy and methotrexate.  Expectations: Psoriatic arthritis is a type of inflammatory arthritis which occurs in conjunction with psoriasis.  Approximately 5% of psoriasis patients develop psoriatic arthritis. Psoriatic arthritis is chronic in nature with  periods of remissions and flares. Flares can be triggered by stress, infections (group A strep), certain medications  and alcohol. Psoriatic arthritis requires systemic medication for adequate treatment.  Contact office if: Your psoriatic arthritis worsens, or fails to improve despite several months of treatment.    - will follow up with Dr. Eugene    Plaque Psoriasis  - clear  Status: well controlled     Plan: Counseling  I counseled the patient regarding the following:  Skin care: Emollients, ambient sun exposure, shampoos with tar, selenium or zinc pyrithione can improve psoriasis.  Expectations: Psoriasis is chronic in nature with periods of remissions and flares. Flares can be triggered by stress, infections (group A strep), certain medications and alcohol.  Contact office if: Psoriasis worsens, or fails to improve despite several months of treatment.      High Risk Medication Monitoring (Z79.899) : The risks and benefits of the medication were reviewed in full with the patient. Should any side effects occur, the patient will stop the medication and contact me immediately.         Follow up in about 3 months (around 7/10/2023) for Psoriasis.    Tatum Perry MD

## 2023-04-26 ENCOUNTER — TELEPHONE (OUTPATIENT)
Dept: DERMATOLOGY | Facility: CLINIC | Age: 63
End: 2023-04-26
Payer: MEDICARE

## 2023-04-26 NOTE — TELEPHONE ENCOUNTER
Patient called to notify the office she had a spot on her lip that she was unsure if it was a fever blister or staph. I advised her the only way to know for sure if it's staph is to make an appointment so  can check the lesion and possibly culture the area. Patient stated she has started taking medication to treat it as a fever blister and will call back to make an appointment if it doesn't resolve.    Pamela Clayton, KEILAN

## 2023-05-14 DIAGNOSIS — F17.200 SMOKER: ICD-10-CM

## 2023-05-14 DIAGNOSIS — M62.838 SPASM OF MUSCLE: ICD-10-CM

## 2023-05-16 RX ORDER — TIZANIDINE 4 MG/1
TABLET ORAL
Qty: 90 TABLET | Refills: 0 | Status: SHIPPED | OUTPATIENT
Start: 2023-05-16 | End: 2023-08-16

## 2023-05-16 RX ORDER — VARENICLINE TARTRATE 1 MG/1
TABLET, FILM COATED ORAL
Qty: 56 TABLET | Refills: 0 | Status: SHIPPED | OUTPATIENT
Start: 2023-05-16 | End: 2023-06-30 | Stop reason: SDUPTHER

## 2023-06-09 DIAGNOSIS — Z71.89 COMPLEX CARE COORDINATION: ICD-10-CM

## 2023-06-27 ENCOUNTER — TELEPHONE (OUTPATIENT)
Dept: FAMILY MEDICINE | Facility: CLINIC | Age: 63
End: 2023-06-27
Payer: MEDICARE

## 2023-06-27 NOTE — TELEPHONE ENCOUNTER
Notified Pt of recent lab results, Pt verbalized understanding, and already has a FU scheduled for Friday.

## 2023-06-30 ENCOUNTER — OFFICE VISIT (OUTPATIENT)
Dept: FAMILY MEDICINE | Facility: CLINIC | Age: 63
End: 2023-06-30
Payer: MEDICARE

## 2023-06-30 ENCOUNTER — EXTERNAL CHRONIC CARE MANAGEMENT (OUTPATIENT)
Dept: FAMILY MEDICINE | Facility: CLINIC | Age: 63
End: 2023-06-30
Payer: MEDICARE

## 2023-06-30 VITALS
TEMPERATURE: 98 F | OXYGEN SATURATION: 98 % | RESPIRATION RATE: 17 BRPM | BODY MASS INDEX: 29.13 KG/M2 | SYSTOLIC BLOOD PRESSURE: 142 MMHG | HEIGHT: 67 IN | DIASTOLIC BLOOD PRESSURE: 82 MMHG | HEART RATE: 64 BPM

## 2023-06-30 DIAGNOSIS — F17.200 SMOKER: ICD-10-CM

## 2023-06-30 DIAGNOSIS — M25.50 ARTHRALGIA, UNSPECIFIED JOINT: Primary | ICD-10-CM

## 2023-06-30 DIAGNOSIS — E78.5 HYPERLIPIDEMIA, UNSPECIFIED HYPERLIPIDEMIA TYPE: ICD-10-CM

## 2023-06-30 DIAGNOSIS — F32.A DEPRESSION, UNSPECIFIED DEPRESSION TYPE: ICD-10-CM

## 2023-06-30 DIAGNOSIS — D64.9 ANEMIA, UNSPECIFIED TYPE: ICD-10-CM

## 2023-06-30 DIAGNOSIS — I10 HYPERTENSION, UNSPECIFIED TYPE: ICD-10-CM

## 2023-06-30 LAB
ALBUMIN SERPL BCP-MCNC: 4.1 G/DL (ref 3.5–5)
ALBUMIN/GLOB SERPL: 1.3 {RATIO}
ALP SERPL-CCNC: 126 U/L (ref 50–130)
ALT SERPL W P-5'-P-CCNC: 24 U/L (ref 13–56)
ANION GAP SERPL CALCULATED.3IONS-SCNC: 12 MMOL/L (ref 7–16)
AST SERPL W P-5'-P-CCNC: 22 U/L (ref 15–37)
BASOPHILS # BLD AUTO: 0.02 K/UL (ref 0–0.2)
BASOPHILS NFR BLD AUTO: 0.4 % (ref 0–1)
BILIRUB SERPL-MCNC: 0.4 MG/DL (ref ?–1.2)
BUN SERPL-MCNC: 16 MG/DL (ref 7–18)
BUN/CREAT SERPL: 13 (ref 6–20)
CALCIUM SERPL-MCNC: 9.4 MG/DL (ref 8.5–10.1)
CHLORIDE SERPL-SCNC: 107 MMOL/L (ref 98–107)
CHOLEST SERPL-MCNC: 193 MG/DL (ref 0–200)
CHOLEST/HDLC SERPL: 3 {RATIO}
CO2 SERPL-SCNC: 28 MMOL/L (ref 21–32)
CREAT SERPL-MCNC: 1.27 MG/DL (ref 0.55–1.02)
DIFFERENTIAL METHOD BLD: ABNORMAL
EGFR (NO RACE VARIABLE) (RUSH/TITUS): 48 ML/MIN/1.73M2
EOSINOPHIL # BLD AUTO: 0.07 K/UL (ref 0–0.5)
EOSINOPHIL NFR BLD AUTO: 1.5 % (ref 1–4)
ERYTHROCYTE [DISTWIDTH] IN BLOOD BY AUTOMATED COUNT: 13.1 % (ref 11.5–14.5)
FERRITIN SERPL-MCNC: 47 NG/ML (ref 8–252)
GLOBULIN SER-MCNC: 3.2 G/DL (ref 2–4)
GLUCOSE SERPL-MCNC: 91 MG/DL (ref 74–106)
HCT VFR BLD AUTO: 37.7 % (ref 38–47)
HDLC SERPL-MCNC: 64 MG/DL (ref 40–60)
HGB BLD-MCNC: 11.6 G/DL (ref 12–16)
IMM GRANULOCYTES # BLD AUTO: 0.01 K/UL (ref 0–0.04)
IMM GRANULOCYTES NFR BLD: 0.2 % (ref 0–0.4)
IRON SATN MFR SERPL: 16 % (ref 14–50)
IRON SERPL-MCNC: 55 ΜG/DL (ref 50–170)
LDLC SERPL CALC-MCNC: 107 MG/DL
LDLC/HDLC SERPL: 1.7 {RATIO}
LYMPHOCYTES # BLD AUTO: 1.52 K/UL (ref 1–4.8)
LYMPHOCYTES NFR BLD AUTO: 32.5 % (ref 27–41)
MCH RBC QN AUTO: 29.2 PG (ref 27–31)
MCHC RBC AUTO-ENTMCNC: 30.8 G/DL (ref 32–36)
MCV RBC AUTO: 95 FL (ref 80–96)
MONOCYTES # BLD AUTO: 0.56 K/UL (ref 0–0.8)
MONOCYTES NFR BLD AUTO: 12 % (ref 2–6)
MPC BLD CALC-MCNC: 10.1 FL (ref 9.4–12.4)
NEUTROPHILS # BLD AUTO: 2.5 K/UL (ref 1.8–7.7)
NEUTROPHILS NFR BLD AUTO: 53.4 % (ref 53–65)
NONHDLC SERPL-MCNC: 129 MG/DL
NRBC # BLD AUTO: 0 X10E3/UL
NRBC, AUTO (.00): 0 %
PLATELET # BLD AUTO: 290 K/UL (ref 150–400)
POTASSIUM SERPL-SCNC: 4.7 MMOL/L (ref 3.5–5.1)
PROT SERPL-MCNC: 7.3 G/DL (ref 6.4–8.2)
RBC # BLD AUTO: 3.97 M/UL (ref 4.2–5.4)
SODIUM SERPL-SCNC: 142 MMOL/L (ref 136–145)
TIBC SERPL-MCNC: 346 ΜG/DL (ref 250–450)
TRIGL SERPL-MCNC: 108 MG/DL (ref 35–150)
VLDLC SERPL-MCNC: 22 MG/DL
WBC # BLD AUTO: 4.68 K/UL (ref 4.5–11)

## 2023-06-30 PROCEDURE — 82728 FERRITIN: ICD-10-PCS | Mod: ,,, | Performed by: CLINICAL MEDICAL LABORATORY

## 2023-06-30 PROCEDURE — 80061 LIPID PANEL: CPT | Mod: ,,, | Performed by: CLINICAL MEDICAL LABORATORY

## 2023-06-30 PROCEDURE — 3008F PR BODY MASS INDEX (BMI) DOCUMENTED: ICD-10-PCS | Mod: ,,, | Performed by: FAMILY MEDICINE

## 2023-06-30 PROCEDURE — 80053 COMPREHENSIVE METABOLIC PANEL: ICD-10-PCS | Mod: ,,, | Performed by: CLINICAL MEDICAL LABORATORY

## 2023-06-30 PROCEDURE — 3077F SYST BP >= 140 MM HG: CPT | Mod: ,,, | Performed by: FAMILY MEDICINE

## 2023-06-30 PROCEDURE — 80061 LIPID PANEL: ICD-10-PCS | Mod: ,,, | Performed by: CLINICAL MEDICAL LABORATORY

## 2023-06-30 PROCEDURE — 85025 COMPLETE CBC W/AUTO DIFF WBC: CPT | Mod: ,,, | Performed by: CLINICAL MEDICAL LABORATORY

## 2023-06-30 PROCEDURE — 80053 COMPREHEN METABOLIC PANEL: CPT | Mod: ,,, | Performed by: CLINICAL MEDICAL LABORATORY

## 2023-06-30 PROCEDURE — 83540 ASSAY OF IRON: CPT | Mod: ,,, | Performed by: CLINICAL MEDICAL LABORATORY

## 2023-06-30 PROCEDURE — 3077F PR MOST RECENT SYSTOLIC BLOOD PRESSURE >= 140 MM HG: ICD-10-PCS | Mod: ,,, | Performed by: FAMILY MEDICINE

## 2023-06-30 PROCEDURE — 83540 IRON AND TIBC: ICD-10-PCS | Mod: ,,, | Performed by: CLINICAL MEDICAL LABORATORY

## 2023-06-30 PROCEDURE — 4010F PR ACE/ARB THEARPY RXD/TAKEN: ICD-10-PCS | Mod: ,,, | Performed by: FAMILY MEDICINE

## 2023-06-30 PROCEDURE — 85025 CBC WITH DIFFERENTIAL: ICD-10-PCS | Mod: ,,, | Performed by: CLINICAL MEDICAL LABORATORY

## 2023-06-30 PROCEDURE — 1160F RVW MEDS BY RX/DR IN RCRD: CPT | Mod: ,,, | Performed by: FAMILY MEDICINE

## 2023-06-30 PROCEDURE — 3079F DIAST BP 80-89 MM HG: CPT | Mod: ,,, | Performed by: FAMILY MEDICINE

## 2023-06-30 PROCEDURE — G0511 CCM/BHI BY RHC/FQHC 20MIN MO: HCPCS | Mod: ,,, | Performed by: FAMILY MEDICINE

## 2023-06-30 PROCEDURE — 3008F BODY MASS INDEX DOCD: CPT | Mod: ,,, | Performed by: FAMILY MEDICINE

## 2023-06-30 PROCEDURE — G0511 PR CHRONIC CARE MGMT, RHC OR FQHC ONLY, 20 MINS OR MORE: ICD-10-PCS | Mod: ,,, | Performed by: FAMILY MEDICINE

## 2023-06-30 PROCEDURE — 1159F PR MEDICATION LIST DOCUMENTED IN MEDICAL RECORD: ICD-10-PCS | Mod: ,,, | Performed by: FAMILY MEDICINE

## 2023-06-30 PROCEDURE — 99214 PR OFFICE/OUTPT VISIT, EST, LEVL IV, 30-39 MIN: ICD-10-PCS | Mod: ,,, | Performed by: FAMILY MEDICINE

## 2023-06-30 PROCEDURE — 83550 IRON BINDING TEST: CPT | Mod: ,,, | Performed by: CLINICAL MEDICAL LABORATORY

## 2023-06-30 PROCEDURE — 82728 ASSAY OF FERRITIN: CPT | Mod: ,,, | Performed by: CLINICAL MEDICAL LABORATORY

## 2023-06-30 PROCEDURE — 1159F MED LIST DOCD IN RCRD: CPT | Mod: ,,, | Performed by: FAMILY MEDICINE

## 2023-06-30 PROCEDURE — 4010F ACE/ARB THERAPY RXD/TAKEN: CPT | Mod: ,,, | Performed by: FAMILY MEDICINE

## 2023-06-30 PROCEDURE — 99214 OFFICE O/P EST MOD 30 MIN: CPT | Mod: ,,, | Performed by: FAMILY MEDICINE

## 2023-06-30 PROCEDURE — 83550 IRON AND TIBC: ICD-10-PCS | Mod: ,,, | Performed by: CLINICAL MEDICAL LABORATORY

## 2023-06-30 PROCEDURE — 3079F PR MOST RECENT DIASTOLIC BLOOD PRESSURE 80-89 MM HG: ICD-10-PCS | Mod: ,,, | Performed by: FAMILY MEDICINE

## 2023-06-30 PROCEDURE — 1160F PR REVIEW ALL MEDS BY PRESCRIBER/CLIN PHARMACIST DOCUMENTED: ICD-10-PCS | Mod: ,,, | Performed by: FAMILY MEDICINE

## 2023-06-30 RX ORDER — BUPROPION HYDROCHLORIDE 100 MG/1
100 TABLET ORAL 2 TIMES DAILY
Qty: 180 TABLET | Refills: 1 | Status: SHIPPED | OUTPATIENT
Start: 2023-06-30 | End: 2024-01-02 | Stop reason: SDUPTHER

## 2023-06-30 RX ORDER — AMLODIPINE BESYLATE 2.5 MG/1
2.5 TABLET ORAL DAILY
Qty: 90 TABLET | Refills: 1 | Status: SHIPPED | OUTPATIENT
Start: 2023-06-30 | End: 2023-08-21

## 2023-06-30 RX ORDER — UPADACITINIB 15 MG/1
TABLET, EXTENDED RELEASE ORAL
COMMUNITY
Start: 2022-11-16 | End: 2023-06-30

## 2023-06-30 RX ORDER — VARENICLINE TARTRATE 1 MG/1
1 TABLET, FILM COATED ORAL 2 TIMES DAILY
Qty: 56 TABLET | Refills: 0 | Status: SHIPPED | OUTPATIENT
Start: 2023-06-30 | End: 2023-07-27

## 2023-06-30 RX ORDER — EPINEPHRINE 0.22MG
AEROSOL WITH ADAPTER (ML) INHALATION
COMMUNITY

## 2023-06-30 RX ORDER — EZETIMIBE 10 MG/1
10 TABLET ORAL NIGHTLY
Qty: 90 TABLET | Refills: 0 | Status: SHIPPED | OUTPATIENT
Start: 2023-06-30 | End: 2023-11-10 | Stop reason: SDUPTHER

## 2023-06-30 NOTE — PROGRESS NOTES
Janene Castillo is a 63 y.o. female seen today for follow-up on hyperlipidemia.  Unfortunately patient reports he was afraid to try the Livalo due to her history of myalgias and I have asked her to discard the drug.  In the meantime we discussed a low-fat diet and a trial of oatmeal in the morning with almond milk.  On her labs patient did have an increase in her LDL now at 114 and we discussed the urgency of decreasing that LDL cholesterol her risk for heart disease.  Patient also had a new onset anemia and iron studies are currently pending.     Past Medical History:   Diagnosis Date    Allergic contact dermatitis     balsam of peru, bacitracin, fragrance, methyldibromo glutaronite    Anxiety     Chronic pain     HTN (hypertension)     Hyperlipidemia     Plaque psoriasis     Prurigo nodularis     Psoriatic arthritis     Stage 3b chronic kidney disease      Family History   Problem Relation Age of Onset    Hypertension Mother     Heart disease Father     Heart attack Father     Heart failure Father     Pacemaker/defibrilator Father     Melanoma Neg Hx      Current Outpatient Medications on File Prior to Visit   Medication Sig Dispense Refill    calcium carbonate (OS-CAIN) 600 mg calcium (1,500 mg) Tab Take 600 mg by mouth once daily.      coenzyme Q10 100 mg capsule as directed Orally      famotidine (PEPCID) 20 MG tablet Take 1 tablet (20 mg total) by mouth 2 (two) times daily. 180 tablet 1    oxyCODONE-acetaminophen (PERCOCET)  mg per tablet Take 1 tablet by mouth every 4 (four) hours as needed for Pain. 2 tablet 0    RINVOQ 15 mg 24 hr tablet Take 15 mg by mouth once daily.      tiZANidine (ZANAFLEX) 4 MG tablet Take 1 tablet by mouth twice daily 90 tablet 0    valACYclovir (VALTREX) 1000 MG tablet Take 2 tablets b.i.d. x1 day as needed for a cold sore 4 tablet 11    zinc gluconate 50 mg tablet Take 50 mg by mouth once daily.      [DISCONTINUED] amLODIPine (NORVASC) 2.5 MG tablet Take 1 tablet (2.5 mg total)  by mouth once daily. 90 tablet 1    [DISCONTINUED] buPROPion (WELLBUTRIN) 100 MG tablet Take 1 tablet (100 mg total) by mouth 2 (two) times daily. 180 tablet 1    [DISCONTINUED] ezetimibe (ZETIA) 10 mg tablet TAKE 1 TABLET BY MOUTH ONCE DAILY IN THE EVENING 90 tablet 0    [DISCONTINUED] upadacitinib (RINVOQ) 15 mg 24 hr tablet 1 tablet Orally Once a day for 30 days      [DISCONTINUED] varenicline (CHANTIX) 1 mg Tab Take 1 tablet by mouth twice daily 56 tablet 0    benazepriL (LOTENSIN) 20 MG tablet Take 1 tablet by mouth once daily (Patient not taking: Reported on 6/30/2023) 90 tablet 0    pitavastatin calcium (LIVALO) 2 mg Tab tablet Take 1 mg by mouth every evening.       No current facility-administered medications on file prior to visit.     Immunization History   Administered Date(s) Administered    COVID-19 MRNA, LN-S PF (MODERNA HALF 0.25 ML DOSE) 04/26/2022    COVID-19, MRNA, LN-S, PF (MODERNA FULL 0.5 ML DOSE) 06/22/2021, 08/06/2021    COVID-19, mRNA, LNP-S, bivalent booster, PF (Moderna Omicron) 11/16/2022       Review of Systems   Constitutional:  Negative for fever, malaise/fatigue and weight loss.   Respiratory:  Negative for shortness of breath.    Cardiovascular:  Negative for chest pain and palpitations.   Gastrointestinal:  Negative for nausea and vomiting.   Psychiatric/Behavioral:  Negative for depression.       Vitals:    06/30/23 0919   BP: (!) 142/90   Pulse: 64   Resp: 17   Temp: 97.8 °F (36.6 °C)       Physical Exam  Vitals reviewed.   Constitutional:       Appearance: Normal appearance.   HENT:      Head: Normocephalic.   Eyes:      Extraocular Movements: Extraocular movements intact.      Conjunctiva/sclera: Conjunctivae normal.      Pupils: Pupils are equal, round, and reactive to light.   Neck:      Thyroid: No thyroid mass or thyromegaly.   Cardiovascular:      Rate and Rhythm: Normal rate and regular rhythm.      Heart sounds: Normal heart sounds. No murmur heard.    No gallop.    Pulmonary:      Effort: Pulmonary effort is normal. No respiratory distress.      Breath sounds: Normal breath sounds. No wheezing or rales.   Skin:     General: Skin is warm and dry.      Coloration: Skin is not jaundiced or pale.   Neurological:      Mental Status: She is alert.   Psychiatric:         Mood and Affect: Mood normal.         Behavior: Behavior normal.         Thought Content: Thought content normal.         Judgment: Judgment normal.        Assessment and Plan  Arthralgia, unspecified joint  -     Ambulatory referral/consult to Pain Clinic; Future; Expected date: 07/07/2023    Smoker  -     varenicline (CHANTIX) 1 mg Tab; Take 1 tablet (1 mg total) by mouth 2 (two) times daily.  Dispense: 56 tablet; Refill: 0    Hypertension, unspecified type  -     amLODIPine (NORVASC) 2.5 MG tablet; Take 1 tablet (2.5 mg total) by mouth once daily.  Dispense: 90 tablet; Refill: 1  -     CBC Auto Differential; Future; Expected date: 12/30/2023  -     Comprehensive Metabolic Panel; Future; Expected date: 12/30/2023    Depression, unspecified depression type  -     buPROPion (WELLBUTRIN) 100 MG tablet; Take 1 tablet (100 mg total) by mouth 2 (two) times daily.  Dispense: 180 tablet; Refill: 1    Hyperlipidemia, unspecified hyperlipidemia type  -     ezetimibe (ZETIA) 10 mg tablet; Take 1 tablet (10 mg total) by mouth every evening.  Dispense: 90 tablet; Refill: 0  -     Lipid Panel; Future; Expected date: 12/30/2023            Return to clinic in 6 months for follow-up lab work and an office visit or as needed.  Patient reports her blood pressures at home are normal only she did not take her blood pressure medication this morning.    Health Maintenance Topics with due status: Not Due       Topic Last Completion Date    Colorectal Cancer Screening 12/22/2020    Lipid Panel 06/23/2023    Influenza Vaccine Not Due

## 2023-07-06 ENCOUNTER — TELEPHONE (OUTPATIENT)
Dept: FAMILY MEDICINE | Facility: CLINIC | Age: 63
End: 2023-07-06
Payer: MEDICARE

## 2023-07-06 NOTE — TELEPHONE ENCOUNTER
----- Message from Asael Huizar MD sent at 7/3/2023  7:52 AM CDT -----  Normal iron studies.  However set up a follow-up appointment for her renal function which is declining and her LDL.  In the meantime have her avoid all NSAIDs and increase her fluid intake.

## 2023-07-06 NOTE — TELEPHONE ENCOUNTER
Notified of results, verbalized understanding. Stated she has an appt scheduled in jan and isn't able to come before then d/t ins

## 2023-07-10 ENCOUNTER — OFFICE VISIT (OUTPATIENT)
Dept: DERMATOLOGY | Facility: CLINIC | Age: 63
End: 2023-07-10
Payer: MEDICARE

## 2023-07-10 DIAGNOSIS — Z79.899 HIGH RISK MEDICATION USE: ICD-10-CM

## 2023-07-10 DIAGNOSIS — L08.9 SKIN INFECTION: ICD-10-CM

## 2023-07-10 DIAGNOSIS — B00.9 HSV-1 (HERPES SIMPLEX VIRUS 1) INFECTION: ICD-10-CM

## 2023-07-10 DIAGNOSIS — L23.89 ALLERGIC CONTACT DERMATITIS DUE TO OTHER AGENTS: ICD-10-CM

## 2023-07-10 DIAGNOSIS — L40.9 PSORIASIS: Primary | ICD-10-CM

## 2023-07-10 DIAGNOSIS — L40.50 PSORIATIC ARTHRITIS: ICD-10-CM

## 2023-07-10 DIAGNOSIS — L20.84 INTRINSIC ECZEMA: ICD-10-CM

## 2023-07-10 PROCEDURE — 1160F PR REVIEW ALL MEDS BY PRESCRIBER/CLIN PHARMACIST DOCUMENTED: ICD-10-PCS | Mod: CPTII,,, | Performed by: DERMATOLOGY

## 2023-07-10 PROCEDURE — 87070 CULTURE, WOUND: ICD-10-PCS | Mod: ,,, | Performed by: CLINICAL MEDICAL LABORATORY

## 2023-07-10 PROCEDURE — 1159F MED LIST DOCD IN RCRD: CPT | Mod: CPTII,,, | Performed by: DERMATOLOGY

## 2023-07-10 PROCEDURE — 1159F PR MEDICATION LIST DOCUMENTED IN MEDICAL RECORD: ICD-10-PCS | Mod: CPTII,,, | Performed by: DERMATOLOGY

## 2023-07-10 PROCEDURE — 1160F RVW MEDS BY RX/DR IN RCRD: CPT | Mod: CPTII,,, | Performed by: DERMATOLOGY

## 2023-07-10 PROCEDURE — 4010F PR ACE/ARB THEARPY RXD/TAKEN: ICD-10-PCS | Mod: CPTII,,, | Performed by: DERMATOLOGY

## 2023-07-10 PROCEDURE — 99214 OFFICE O/P EST MOD 30 MIN: CPT | Mod: ,,, | Performed by: DERMATOLOGY

## 2023-07-10 PROCEDURE — 4010F ACE/ARB THERAPY RXD/TAKEN: CPT | Mod: CPTII,,, | Performed by: DERMATOLOGY

## 2023-07-10 PROCEDURE — 99214 PR OFFICE/OUTPT VISIT, EST, LEVL IV, 30-39 MIN: ICD-10-PCS | Mod: ,,, | Performed by: DERMATOLOGY

## 2023-07-10 PROCEDURE — 87070 CULTURE OTHR SPECIMN AEROBIC: CPT | Mod: ,,, | Performed by: CLINICAL MEDICAL LABORATORY

## 2023-07-10 RX ORDER — VALACYCLOVIR HYDROCHLORIDE 500 MG/1
500 TABLET, FILM COATED ORAL DAILY
Qty: 30 TABLET | Refills: 11 | Status: SHIPPED | OUTPATIENT
Start: 2023-07-10 | End: 2024-07-09

## 2023-07-10 NOTE — PROGRESS NOTES
[facility]   Tatum Perry MD    Patient Name: Janene Castillo  Patient YOB: 1960   Date of Service: 7/10/23    CC: Follow-up Allergic Contact Dermatitis    HPI: Janene Castillo is a 63 y.o. female here today for follow-up of ACD, psoriasis, psoriatic arthritis and atopic dermatitis last seen 2023.  Previous treatments include Dupixent, Cosentyx, Stelara, Skyrizi, and currently Rinvoq. Overall, the above problems are improved. Treatment plan was followed as directed.      Past Medical History:   Diagnosis Date    Allergic contact dermatitis     balsam of peru, bacitracin, fragrance, methyldibromo glutaronite    Anxiety     Chronic pain     HTN (hypertension)     Hyperlipidemia     Plaque psoriasis     Prurigo nodularis     Psoriatic arthritis     Stage 3b chronic kidney disease      Past Surgical History:   Procedure Laterality Date    ANGIOGRAM, CORONARY, WITH LEFT HEART CATHETERIZATION N/A 2022    Procedure: Angiogram, Coronary, with Left Heart Cath;  Surgeon: Jason Ratliff MD;  Location: [facility] CATH LAB;  Service: Cardiology;  Laterality: N/A;     SECTION       Review of patient's allergies indicates:   Allergen Reactions    Bacitracin Dermatitis    Codeine sulfate     Codeine Rash     Rash     Methyldibromoglutaronitrile Dermatitis    Permethrin Rash     Rash     Sulfa (sulfonamide antibiotics) Other (See Comments) and Rash     unknown       Current Outpatient Medications:     amLODIPine (NORVASC) 2.5 MG tablet, Take 1 tablet (2.5 mg total) by mouth once daily., Disp: 90 tablet, Rfl: 1    benazepriL (LOTENSIN) 20 MG tablet, Take 1 tablet by mouth once daily (Patient not taking: Reported on 2023), Disp: 90 tablet, Rfl: 0    buPROPion (WELLBUTRIN) 100 MG tablet, Take 1 tablet (100 mg total) by mouth 2 (two) times daily., Disp: 180 tablet, Rfl: 1    calcium carbonate (OS-CAIN) 600 mg calcium (1,500 mg) Tab, Take 600 mg by mouth once daily., Disp: , Rfl:     coenzyme  Q10 100 mg capsule, as directed Orally, Disp: , Rfl:     ezetimibe (ZETIA) 10 mg tablet, Take 1 tablet (10 mg total) by mouth every evening., Disp: 90 tablet, Rfl: 0    famotidine (PEPCID) 20 MG tablet, Take 1 tablet (20 mg total) by mouth 2 (two) times daily., Disp: 180 tablet, Rfl: 1    oxyCODONE-acetaminophen (PERCOCET)  mg per tablet, Take 1 tablet by mouth every 4 (four) hours as needed for Pain., Disp: 2 tablet, Rfl: 0    pitavastatin calcium (LIVALO) 2 mg Tab tablet, Take 1 mg by mouth every evening., Disp: , Rfl:     RINVOQ 15 mg 24 hr tablet, Take 15 mg by mouth once daily., Disp: , Rfl:     tiZANidine (ZANAFLEX) 4 MG tablet, Take 1 tablet by mouth twice daily, Disp: 90 tablet, Rfl: 0    valACYclovir (VALTREX) 1000 MG tablet, Take 2 tablets b.i.d. x1 day as needed for a cold sore, Disp: 4 tablet, Rfl: 11    valACYclovir (VALTREX) 500 MG tablet, Take 1 tablet (500 mg total) by mouth once daily., Disp: 30 tablet, Rfl: 11    varenicline (CHANTIX) 1 mg Tab, Take 1 tablet (1 mg total) by mouth 2 (two) times daily., Disp: 56 tablet, Rfl: 0    zinc gluconate 50 mg tablet, Take 50 mg by mouth once daily., Disp: , Rfl:     ROS: A focused review of systems was obtained and negative.     Exam: A focused skin exam was performed. All areas examined were normal except as mentioned in the assessment and plan below.  General Appearance of the patient is well developed and well nourished.  Orientation: alert and oriented x 3.  Mood and affect: pleasant.    Assessment:   The primary encounter diagnosis was Psoriasis. Diagnoses of Psoriatic arthritis, Allergic contact dermatitis due to other agents, Intrinsic eczema, HSV-1 (herpes simplex virus 1) infection, Skin infection, and High risk medication use were also pertinent to this visit.    Plan:   Medications Ordered This Encounter   Medications    valACYclovir (VALTREX) 500 MG tablet     Sig: Take 1 tablet (500 mg total) by mouth once daily.     Dispense:  30 tablet      Refill:  11     Allergic Contact Dermatitis (L23.89)  - clear  Status: stable     Plan: Counseling.  I counseled the patient regarding the following:  Skin care: Patient should use hypoallergenic products such as unscented soaps. Eliminate exposure to all new  cosmetics, fragrances, hair products, nail products shampoos, scented soaps, plants, metals and sunscreens.  Expectations: Allergic contact dermatitis can persist for several weeks before it fully resolves. Sometimes, patch  testing is necessary if reactions persist or if the patient is in contact with several potential allergens.  Contact office if: Allergic contact dermatitis worsens or fails to improve despite several weeks of treatment.     - continue to follow [place] list    Atopic Dermatitis (L20.89)  - clear    Status: stable    Plan: Counseling.  I counseled the patient regarding the following:  Skin care: Patient should bathe using lukewarm water with a mild cleanser and moisturize immediately after.  Emollients should be applied at least 2-3 times daily. Avoid scented detergents or fabric softeners. Keep  fingernails short. Avoid excessive hand washing.  Expectations: The patient is aware that eczema is chronic in nature and can improve with moisturizers and topical  steroids and worsen with stress, scented soaps, detergents, scratching, dry skin, changes in weather and skin  infections.  Contact office if: Eczema worsens or fails to improve despite several weeks of treatment; patient develops skin  infections (such as: yellow honey colored crusts or cold sores).    - continue Rinvoq.  Will prescribe refills while pt is coordinating care with a new rheumatologist since Dr. Eugene is leaving.    Herpes Simplex  - grouped erythematous vesicles and erosions  Status: Inadequately controlled     Plan: Counseling  I counseled the patient regarding the following:  Skin care: Lesions can be treated with anti-viral therapy.  Expectations: Herpes Simplex is  spread through contact, usually self limited and/or resolves with anti-viral therapy. However, reactivation of the virus is common. Triggers include stress, trauma, other illnesses and sunlight. Multiple recurrences per year may require suppressive therapy.  Contact office if: Herpes fails to respond to treatment or spreads to other parts of the body.    - will start valtrex suppressive dose    Psoriatic Arthritis  - Psoriasiform plaques with micaceous scale  Status: Improved     Plan: Counseling.  I counseled the patient regarding the following:  Instructions: Systemic medications are the treatment of choice for psoriatic arthritis. Treatment options include  anti-TNF therapy and methotrexate.  Expectations: Psoriatic arthritis is a type of inflammatory arthritis which occurs in conjunction with psoriasis.  Approximately 5% of psoriasis patients develop psoriatic arthritis. Psoriatic arthritis is chronic in nature with  periods of remissions and flares. Flares can be triggered by stress, infections (group A strep), certain medications  and alcohol. Psoriatic arthritis requires systemic medication for adequate treatment.  Contact office if: Your psoriatic arthritis worsens, or fails to improve despite several months of treatment.    - will follow up with Dr. Eugene    Plaque Psoriasis  - clear  Status: well controlled     Plan: Counseling  I counseled the patient regarding the following:  Skin care: Emollients, ambient sun exposure, shampoos with tar, selenium or zinc pyrithione can improve psoriasis.  Expectations: Psoriasis is chronic in nature with periods of remissions and flares. Flares can be triggered by stress, infections (group A strep), certain medications and alcohol.  Contact office if: Psoriasis worsens, or fails to improve despite several months of treatment.    Skin Infection, NOS   - crusting of one psoriatic plaque on the right dorsal foot    Plan: Counseling  I counseled the patient regarding the  following:  Skin care: Patients with purulence or fluid collections should have their wounds re-opened, drained, cultured and irrigated. All wound infections should be treated with antibiotics.  Expectations: Wound Infections usually occur 4-7 days postoperatively. Patients exhibit, pain, rednes, swelling, cellulitic changes and fever.  Contact Office if: Wound Infection fails to respond to treatment or worsens, patient develops a fever, or if redness spreads despite antibiotics.    A bacterial culture was obtained from the right foot    High Risk Medication Monitoring (Z79.899) : The risks and benefits of the medication were reviewed in full with the patient. Should any side effects occur, the patient will stop the medication and contact me immediately.    Rinvoq counseling: I discussed with patient potential side effects including lowered immune system, infections, cardiovascular risk including heart attack and stroke, allergic reactions, blot clots, cancer, and tears in the stomach or intestines.  I discussed lab monitoring with patient.  Patient will call with any side effects including symptoms of infection.        Follow up in about 6 months (around 1/10/2024).    Tatum Perry MD

## 2023-07-12 LAB — MICROORGANISM SPEC CULT: NORMAL

## 2023-07-13 ENCOUNTER — OFFICE VISIT (OUTPATIENT)
Dept: FAMILY MEDICINE | Facility: CLINIC | Age: 63
End: 2023-07-13
Payer: MEDICARE

## 2023-07-13 VITALS
OXYGEN SATURATION: 100 % | BODY MASS INDEX: 30.92 KG/M2 | HEIGHT: 67 IN | WEIGHT: 197 LBS | RESPIRATION RATE: 20 BRPM | TEMPERATURE: 98 F | SYSTOLIC BLOOD PRESSURE: 140 MMHG | HEART RATE: 71 BPM | DIASTOLIC BLOOD PRESSURE: 90 MMHG

## 2023-07-13 DIAGNOSIS — R10.30 LOWER ABDOMINAL PAIN: Primary | ICD-10-CM

## 2023-07-13 PROCEDURE — 3080F PR MOST RECENT DIASTOLIC BLOOD PRESSURE >= 90 MM HG: ICD-10-PCS | Mod: ,,, | Performed by: FAMILY MEDICINE

## 2023-07-13 PROCEDURE — 3008F PR BODY MASS INDEX (BMI) DOCUMENTED: ICD-10-PCS | Mod: ,,, | Performed by: FAMILY MEDICINE

## 2023-07-13 PROCEDURE — 4010F ACE/ARB THERAPY RXD/TAKEN: CPT | Mod: ,,, | Performed by: FAMILY MEDICINE

## 2023-07-13 PROCEDURE — 4010F PR ACE/ARB THEARPY RXD/TAKEN: ICD-10-PCS | Mod: ,,, | Performed by: FAMILY MEDICINE

## 2023-07-13 PROCEDURE — 3008F BODY MASS INDEX DOCD: CPT | Mod: ,,, | Performed by: FAMILY MEDICINE

## 2023-07-13 PROCEDURE — 1159F MED LIST DOCD IN RCRD: CPT | Mod: ,,, | Performed by: FAMILY MEDICINE

## 2023-07-13 PROCEDURE — 99213 OFFICE O/P EST LOW 20 MIN: CPT | Mod: ,,, | Performed by: FAMILY MEDICINE

## 2023-07-13 PROCEDURE — 3077F SYST BP >= 140 MM HG: CPT | Mod: ,,, | Performed by: FAMILY MEDICINE

## 2023-07-13 PROCEDURE — 99213 PR OFFICE/OUTPT VISIT, EST, LEVL III, 20-29 MIN: ICD-10-PCS | Mod: ,,, | Performed by: FAMILY MEDICINE

## 2023-07-13 PROCEDURE — 1159F PR MEDICATION LIST DOCUMENTED IN MEDICAL RECORD: ICD-10-PCS | Mod: ,,, | Performed by: FAMILY MEDICINE

## 2023-07-13 PROCEDURE — 3077F PR MOST RECENT SYSTOLIC BLOOD PRESSURE >= 140 MM HG: ICD-10-PCS | Mod: ,,, | Performed by: FAMILY MEDICINE

## 2023-07-13 PROCEDURE — 3080F DIAST BP >= 90 MM HG: CPT | Mod: ,,, | Performed by: FAMILY MEDICINE

## 2023-07-13 NOTE — PROGRESS NOTES
Subjective     Patient ID: Janene Castillo is a 63 y.o. female.    Chief Complaint: Abdominal Pain (Abdominal pain for a week, slightly constipated, nausea  for about a month, have tried over the counter and nothing is helping. Pain is between the bladder and stomach.) and Fever    Patient has not actually had any fever.  She said her head felt warm today.  She said it feels warm now and she is afebrile.  Nausea for 1 month.  She says she took milk of magnesia and seemed to have good results.  No dysuria.    Abdominal Pain  Associated symptoms include a fever.   Fever   Associated symptoms include abdominal pain.   Review of Systems   Constitutional:  Positive for fever.   Gastrointestinal:  Positive for abdominal pain.        Objective     Physical Exam  Constitutional:       General: She is in acute distress (She appears uncomfortable).      Appearance: She is not ill-appearing.   Cardiovascular:      Rate and Rhythm: Normal rate and regular rhythm.   Pulmonary:      Effort: Pulmonary effort is normal.      Breath sounds: Normal breath sounds.   Abdominal:      Tenderness: There is abdominal tenderness (somewhat diffuse tenderness.  Worse more prominent and left suprapubic area in left lower quadrant). There is guarding (left lower quadrant).   Skin:     Findings: No lesion or rash.   Neurological:      Mental Status: She is alert.          Assessment and Plan     1. Lower abdominal pain  -     X-Ray KUB; Future; Expected date: 07/13/2023  -     POCT URINALYSIS W/O SCOPE    KUB shows severe constipation    Extremely tender left lower quadrant of abdomen.  Constipation does not rule out other pathology.  Patient referred [place] emergency room for further evaluation.  Also, she has a history of ischemic colitis         No follow-ups on file.

## 2023-07-18 NOTE — PROGRESS NOTES
[facility]  Chief Complaint      Chief Complaint   Patient presents with    Constipation     X1 week, reports she is taking Starr, with little relief.    Nausea     X1 month, vomiting just in the last week        History of Present Illness      Janene Castillo is a 63 y.o. female with chronic conditions of Obesity, Hypertension, Hyperlipidemia, Stage 3b Chronic Kidney Disease, Psoriasis, and Family Hx of Premature CAD who presents today for c/o constipation. She was seen at immediate care on 2023 for lower abdominal pain KUB was performed and revealed moderate to severe colonic stool. She was discharged from immediate care to follow up at [facility] ED for further evaluation as she had some lower abdominal tenderness on physical examination. She went to ED but left without seeing MD. She reports being nauseated x 1 month. She reports her last BM was one week ago. She has tried Starr Milk of Magnesia. She reports having tried Miralax in the past but did not have good results with that. She has tried increased water and fiber in diet.     Past Medical History:  Past Medical History:   Diagnosis Date    Allergic contact dermatitis     balsam of peru, bacitracin, fragrance, methyldibromo glutaronite    Anxiety     Chronic pain     HTN (hypertension)     Hyperlipidemia     Plaque psoriasis     Prurigo nodularis     Psoriatic arthritis     Stage 3b chronic kidney disease        Past Surgical History:   has a past surgical history that includes  section and ANGIOGRAM, CORONARY, WITH LEFT HEART CATHETERIZATION (N/A, 2022).    Social History:  Social History     Tobacco Use    Smoking status: Former     Packs/day: 1.00     Years: 44.00     Pack years: 44.00     Types: Cigarettes     Passive exposure: Past    Smokeless tobacco: Never   Substance Use Topics    Alcohol use: Not Currently       I personally reviewed all past medical, surgical, and social.  "    Review of Systems   Constitutional:  Negative for chills and fever.   Respiratory:  Negative for shortness of breath.    Cardiovascular:  Negative for chest pain.   Gastrointestinal:  Positive for abdominal pain, constipation and nausea. Negative for diarrhea.   Genitourinary:  Positive for urgency ("on Thursday"). Negative for dysuria and frequency.      Medications:  Outpatient Encounter Medications as of 7/19/2023   Medication Sig Dispense Refill    amLODIPine (NORVASC) 2.5 MG tablet Take 1 tablet (2.5 mg total) by mouth once daily. 90 tablet 1    benazepriL (LOTENSIN) 20 MG tablet Take 1 tablet by mouth once daily 90 tablet 0    buPROPion (WELLBUTRIN) 100 MG tablet Take 1 tablet (100 mg total) by mouth 2 (two) times daily. 180 tablet 1    calcium carbonate (OS-CAIN) 600 mg calcium (1,500 mg) Tab Take 600 mg by mouth once daily.      coenzyme Q10 100 mg capsule as directed Orally      ezetimibe (ZETIA) 10 mg tablet Take 1 tablet (10 mg total) by mouth every evening. 90 tablet 0    famotidine (PEPCID) 20 MG tablet Take 1 tablet (20 mg total) by mouth 2 (two) times daily. 180 tablet 1    oxyCODONE-acetaminophen (PERCOCET)  mg per tablet Take 1 tablet by mouth every 4 (four) hours as needed for Pain. 2 tablet 0    pitavastatin calcium (LIVALO) 2 mg Tab tablet Take 1 mg by mouth every evening.      RINVOQ 15 mg 24 hr tablet Take 15 mg by mouth once daily.      tiZANidine (ZANAFLEX) 4 MG tablet Take 1 tablet by mouth twice daily 90 tablet 0    valACYclovir (VALTREX) 1000 MG tablet Take 2 tablets b.i.d. x1 day as needed for a cold sore 4 tablet 11    valACYclovir (VALTREX) 500 MG tablet Take 1 tablet (500 mg total) by mouth once daily. 30 tablet 11    varenicline (CHANTIX) 1 mg Tab Take 1 tablet (1 mg total) by mouth 2 (two) times daily. 56 tablet 0    zinc gluconate 50 mg tablet Take 50 mg by mouth once daily.      linaCLOtide (LINZESS) 145 mcg Cap capsule Take 1 capsule (145 mcg total) by mouth before " breakfast. 30 capsule 3     No facility-administered encounter medications on file as of 7/19/2023.       Allergies:  Review of patient's allergies indicates:   Allergen Reactions    Bacitracin Dermatitis    Codeine sulfate     Codeine Rash     Rash     Methyldibromoglutaronitrile Dermatitis    Permethrin Rash     Rash     Sulfa (sulfonamide antibiotics) Other (See Comments) and Rash     unknown       Health Maintenance:  Immunization History   Administered Date(s) Administered    COVID-19 MRNA, LN-S PF (MODERNA HALF 0.25 ML DOSE) 04/26/2022    COVID-19, MRNA, LN-S, PF (MODERNA FULL 0.5 ML DOSE) 06/22/2021, 08/06/2021    COVID-19, mRNA, LNP-S, bivalent booster, PF (Moderna Omicron) 11/16/2022        Health Maintenance   Topic Date Due    Hepatitis C Screening  Overdue    TETANUS VACCINE  Overdue    Aspirin/Antiplatelet Therapy  Overdue     Mammogram  08/22/2023    Lipid Panel  06/30/2028        Physical Exam      Physical Exam  HENT:      Head: Normocephalic.      Right Ear: External ear normal.      Left Ear: External ear normal.   Cardiovascular:      Rate and Rhythm: Normal rate and regular rhythm.      Pulses: Normal pulses.      Heart sounds: Normal heart sounds.   Pulmonary:      Effort: Pulmonary effort is normal.      Breath sounds: Normal breath sounds.   Abdominal:      General: Bowel sounds are decreased.      Palpations: Abdomen is soft.      Tenderness: There is no abdominal tenderness.   Neurological:      Mental Status: She is alert and oriented to person, place, and time.   Psychiatric:         Mood and Affect: Mood normal.         Behavior: Behavior normal.        Laboratory:    Lab Results   Component Value Date    GLU 91 06/30/2023     06/30/2023    K 4.7 06/30/2023     06/30/2023    CO2 28 06/30/2023    BUN 16 06/30/2023    CREATININE 1.27 (H) 06/30/2023    CALCIUM 9.4 06/30/2023    PROT 7.3 06/30/2023    ALBUMIN 4.1 06/30/2023    BILITOT 0.4 06/30/2023    ALKPHOS 126 06/30/2023     AST 22 06/30/2023    ALT 24 06/30/2023    ANIONGAP 12 06/30/2023    ESTGFRAFRICA 44 10/15/2020    EGFRNONAA 46 (L) 07/01/2022       Lab Results   Component Value Date    WBC 4.68 06/30/2023    RBC 3.97 (L) 06/30/2023    HGB 11.6 (L) 06/30/2023    HCT 37.7 (L) 06/30/2023    MCV 95.0 06/30/2023    RDW 13.1 06/30/2023     06/30/2023        Lab Results   Component Value Date    CHOL 193 06/30/2023    TRIG 108 06/30/2023    HDL 64 (H) 06/30/2023    LDLCALC 107 06/30/2023       Lab Results   Component Value Date    TSH 2.010 06/07/2021       No results found for: HGBA1C, ESTIMATEDAVG     No results found for: ZFOKEOXN06    No results found for: [id number]      Point Of Care Testing:  No results found for: WBCUR, NITRITE, UROBILINOGEN, PROTEINPOC, PHUR, BLOODUR, SPECGRAV, KETONESU, BILIRUBINPOC, GLUCOSEUR    Lab Results   Component Value Date    [id number] Negative 01/28/2022         Assessment/Plan     Chronic idiopathic constipation  -     linaCLOtide (LINZESS) 145 mcg Cap capsule; Take 1 capsule (145 mcg total) by mouth before breakfast.  Dispense: 30 capsule; Refill: 3    Nausea      Continue Zofran as needed for nausea    Sample of Linzess 72 mcg with instructions to take one capsule in the morning before breakfast x 8 days then increase to 145 mcg     Lot# [id number] exp 12/2023 2 boxes #4 each (total of 8)    Return to clinic in 1 month for follow up of constipation.    Questions answered to desired level of satisfaction    Verbalized understanding to all information and instructions provided    JOLENE [facility]

## 2023-07-19 ENCOUNTER — OFFICE VISIT (OUTPATIENT)
Dept: FAMILY MEDICINE | Facility: CLINIC | Age: 63
End: 2023-07-19
Payer: MEDICARE

## 2023-07-19 VITALS
WEIGHT: 197 LBS | HEIGHT: 67 IN | TEMPERATURE: 98 F | SYSTOLIC BLOOD PRESSURE: 122 MMHG | OXYGEN SATURATION: 99 % | DIASTOLIC BLOOD PRESSURE: 87 MMHG | RESPIRATION RATE: 17 BRPM | BODY MASS INDEX: 30.92 KG/M2 | HEART RATE: 90 BPM

## 2023-07-19 DIAGNOSIS — R11.0 NAUSEA: ICD-10-CM

## 2023-07-19 DIAGNOSIS — K59.04 CHRONIC IDIOPATHIC CONSTIPATION: Primary | ICD-10-CM

## 2023-07-19 PROBLEM — Z86.0100 HISTORY OF COLONIC POLYPS: Status: ACTIVE | Noted: 2020-10-15

## 2023-07-19 PROBLEM — Z86.010 HISTORY OF COLONIC POLYPS: Status: ACTIVE | Noted: 2020-10-15

## 2023-07-19 PROCEDURE — 3079F PR MOST RECENT DIASTOLIC BLOOD PRESSURE 80-89 MM HG: ICD-10-PCS | Mod: ,,, | Performed by: NURSE PRACTITIONER

## 2023-07-19 PROCEDURE — 1160F PR REVIEW ALL MEDS BY PRESCRIBER/CLIN PHARMACIST DOCUMENTED: ICD-10-PCS | Mod: ,,, | Performed by: NURSE PRACTITIONER

## 2023-07-19 PROCEDURE — 4010F ACE/ARB THERAPY RXD/TAKEN: CPT | Mod: ,,, | Performed by: NURSE PRACTITIONER

## 2023-07-19 PROCEDURE — 1159F PR MEDICATION LIST DOCUMENTED IN MEDICAL RECORD: ICD-10-PCS | Mod: ,,, | Performed by: NURSE PRACTITIONER

## 2023-07-19 PROCEDURE — 3079F DIAST BP 80-89 MM HG: CPT | Mod: ,,, | Performed by: NURSE PRACTITIONER

## 2023-07-19 PROCEDURE — 99213 OFFICE O/P EST LOW 20 MIN: CPT | Mod: ,,, | Performed by: NURSE PRACTITIONER

## 2023-07-19 PROCEDURE — 3074F PR MOST RECENT SYSTOLIC BLOOD PRESSURE < 130 MM HG: ICD-10-PCS | Mod: ,,, | Performed by: NURSE PRACTITIONER

## 2023-07-19 PROCEDURE — 99213 PR OFFICE/OUTPT VISIT, EST, LEVL III, 20-29 MIN: ICD-10-PCS | Mod: ,,, | Performed by: NURSE PRACTITIONER

## 2023-07-19 PROCEDURE — 1160F RVW MEDS BY RX/DR IN RCRD: CPT | Mod: ,,, | Performed by: NURSE PRACTITIONER

## 2023-07-19 PROCEDURE — 3008F PR BODY MASS INDEX (BMI) DOCUMENTED: ICD-10-PCS | Mod: ,,, | Performed by: NURSE PRACTITIONER

## 2023-07-19 PROCEDURE — 3074F SYST BP LT 130 MM HG: CPT | Mod: ,,, | Performed by: NURSE PRACTITIONER

## 2023-07-19 PROCEDURE — 1159F MED LIST DOCD IN RCRD: CPT | Mod: ,,, | Performed by: NURSE PRACTITIONER

## 2023-07-19 PROCEDURE — 3008F BODY MASS INDEX DOCD: CPT | Mod: ,,, | Performed by: NURSE PRACTITIONER

## 2023-07-19 PROCEDURE — 4010F PR ACE/ARB THEARPY RXD/TAKEN: ICD-10-PCS | Mod: ,,, | Performed by: NURSE PRACTITIONER

## 2023-07-19 NOTE — PATIENT INSTRUCTIONS
Please bring ALL medications bottles (from ALL providers) including over-the-counter medications to your next appointment !!!       Continue Zofran for nausea as directed

## 2023-07-25 DIAGNOSIS — F17.200 SMOKER: ICD-10-CM

## 2023-07-27 RX ORDER — VARENICLINE TARTRATE 1 MG/1
TABLET, FILM COATED ORAL
Qty: 56 TABLET | Refills: 0 | Status: SHIPPED | OUTPATIENT
Start: 2023-07-27 | End: 2023-08-16

## 2023-07-31 ENCOUNTER — EXTERNAL CHRONIC CARE MANAGEMENT (OUTPATIENT)
Dept: FAMILY MEDICINE | Facility: CLINIC | Age: 63
End: 2023-07-31
Payer: MEDICARE

## 2023-07-31 PROCEDURE — G0511 CCM/BHI BY RHC/FQHC 20MIN MO: HCPCS | Mod: ,,, | Performed by: FAMILY MEDICINE

## 2023-07-31 PROCEDURE — G0511 PR CHRONIC CARE MGMT, RHC OR FQHC ONLY, 20 MINS OR MORE: ICD-10-PCS | Mod: ,,, | Performed by: FAMILY MEDICINE

## 2023-08-03 ENCOUNTER — OFFICE VISIT (OUTPATIENT)
Dept: OBSTETRICS AND GYNECOLOGY | Facility: CLINIC | Age: 63
End: 2023-08-03
Payer: MEDICARE

## 2023-08-03 VITALS
SYSTOLIC BLOOD PRESSURE: 130 MMHG | BODY MASS INDEX: 30.24 KG/M2 | DIASTOLIC BLOOD PRESSURE: 72 MMHG | WEIGHT: 192.63 LBS | HEIGHT: 67 IN

## 2023-08-03 DIAGNOSIS — Z01.419 ENCNTR FOR GYN EXAM (GENERAL) (ROUTINE) W/O ABN FINDINGS: Primary | ICD-10-CM

## 2023-08-03 DIAGNOSIS — Z12.4 SCREENING FOR MALIGNANT NEOPLASM OF THE CERVIX: ICD-10-CM

## 2023-08-03 PROCEDURE — G0476 HPV COMBO ASSAY CA SCREEN: HCPCS | Mod: ,,, | Performed by: CLINICAL MEDICAL LABORATORY

## 2023-08-03 PROCEDURE — 3075F SYST BP GE 130 - 139MM HG: CPT | Mod: CPTII,,, | Performed by: OBSTETRICS & GYNECOLOGY

## 2023-08-03 PROCEDURE — 99214 OFFICE O/P EST MOD 30 MIN: CPT | Mod: PBBFAC | Performed by: OBSTETRICS & GYNECOLOGY

## 2023-08-03 PROCEDURE — 3008F BODY MASS INDEX DOCD: CPT | Mod: CPTII,,, | Performed by: OBSTETRICS & GYNECOLOGY

## 2023-08-03 PROCEDURE — 4010F ACE/ARB THERAPY RXD/TAKEN: CPT | Mod: CPTII,,, | Performed by: OBSTETRICS & GYNECOLOGY

## 2023-08-03 PROCEDURE — 1159F PR MEDICATION LIST DOCUMENTED IN MEDICAL RECORD: ICD-10-PCS | Mod: CPTII,,, | Performed by: OBSTETRICS & GYNECOLOGY

## 2023-08-03 PROCEDURE — 3075F PR MOST RECENT SYSTOLIC BLOOD PRESS GE 130-139MM HG: ICD-10-PCS | Mod: CPTII,,, | Performed by: OBSTETRICS & GYNECOLOGY

## 2023-08-03 PROCEDURE — G0101 CA SCREEN;PELVIC/BREAST EXAM: HCPCS | Mod: S$PBB,GZ,, | Performed by: OBSTETRICS & GYNECOLOGY

## 2023-08-03 PROCEDURE — 4010F PR ACE/ARB THEARPY RXD/TAKEN: ICD-10-PCS | Mod: CPTII,,, | Performed by: OBSTETRICS & GYNECOLOGY

## 2023-08-03 PROCEDURE — 3078F DIAST BP <80 MM HG: CPT | Mod: CPTII,,, | Performed by: OBSTETRICS & GYNECOLOGY

## 2023-08-03 PROCEDURE — 3078F PR MOST RECENT DIASTOLIC BLOOD PRESSURE < 80 MM HG: ICD-10-PCS | Mod: CPTII,,, | Performed by: OBSTETRICS & GYNECOLOGY

## 2023-08-03 PROCEDURE — G0101 PR CA SCREEN;PELVIC/BREAST EXAM: ICD-10-PCS | Mod: S$PBB,GZ,, | Performed by: OBSTETRICS & GYNECOLOGY

## 2023-08-03 PROCEDURE — 1159F MED LIST DOCD IN RCRD: CPT | Mod: CPTII,,, | Performed by: OBSTETRICS & GYNECOLOGY

## 2023-08-03 PROCEDURE — G0476 HUMAN PAPILLOMAVIRUS (HPV): ICD-10-PCS | Mod: ,,, | Performed by: CLINICAL MEDICAL LABORATORY

## 2023-08-03 PROCEDURE — 88142 CYTOPATH C/V THIN LAYER: CPT | Mod: TC,GCY | Performed by: OBSTETRICS & GYNECOLOGY

## 2023-08-03 PROCEDURE — 3008F PR BODY MASS INDEX (BMI) DOCUMENTED: ICD-10-PCS | Mod: CPTII,,, | Performed by: OBSTETRICS & GYNECOLOGY

## 2023-08-03 NOTE — PROGRESS NOTES
Subjective:       Patient ID: Janene Castillo is a 63 y.o. female.    Chief Complaint: Breast Pain (Pt presents with hx of left breast tenderness, questionable nodule x 2 weeks. Check up due also.)    Presents for yearly gyn check.      She is also complaining of some left breast tenderness over the last few weeks.      She has mammography scheduled the 28th of this month.      Discussed the use of Estroven over-the-counter for hot flashes      Review of Systems      Objective:      Physical Exam  Exam conducted with a chaperone present.   HENT:      Head: Normocephalic.      Nose: Nose normal.   Eyes:      Pupils: Pupils are equal, round, and reactive to light.   Cardiovascular:      Rate and Rhythm: Normal rate.   Pulmonary:      Effort: Pulmonary effort is normal.      Breath sounds: Normal breath sounds.   Chest:      Comments: On thorough breast examination no obvious palpable masses no skin retraction or nipple dimpling.  She will have mammography screening on the 28th of August.      She will notify me if she is not receive report within 2 weeks  Abdominal:      General: Abdomen is flat.      Palpations: Abdomen is soft.   Genitourinary:     General: Normal vulva.      Vagina: Normal.      Cervix: Normal.      Uterus: Normal.       Adnexa: Right adnexa normal and left adnexa normal.      Rectum: Normal.      Comments: External normal vault normal cervix normal to appearance.  Pap taken.  Previous Pap 2020.  However Pap was negative but HPV not done.  Therefore Pap and HPV testing done today.  Bimanual exam revealed uterus normal size ovaries are nonpalpable on vaginal and rectovaginal examination.  Hemoccult was not done since she had colonoscopy screening February 2022.  Done by Dr. Oconnor at [facility].  She is now seeing Dr. Anderson.  Musculoskeletal:         General: Normal range of motion.      Cervical back: Full passive range of motion without pain, normal range of motion and neck supple.  7 year female patient with history of CHF with reduced ejection fraction( 20-25%) S/P AICD, COPD on home O2 3L/MIN, diabetes mellitus, CVA with residual right sided weakness, chronic pain, depression, admitted with worsening SOB. Pt was stable this AM. She reports no sob at rest. Pt complained of pain in RUQ and RLQ later. She had a episode of hypotension 84/60 with HR of 69.    #Acute decompensated heart failure with reduced ejection fraction (25-30%):  -Was on lasix 40 IV bid- Will hold lasix for today as the pt has RITCHIE (cr.1.8<-- 2.3<--1.5)  -Can restart lasix when creat is back to baseline  -monitor intake/output  -daily body weights  -continue with metoprolol  -Held entresto due to hypotensive episode; /57 today. Will continue holding entresto.   -Fluid restriction, keep negative balance     #RITCHIE- pre-renal likely 2/2 overdiuresis vs. hypotensive episode?  -Cr1.8<-- 2.3<--1.5  -Will hold diuretics  -Will hold entresto  -f/u bmp  -will f/u urine urea, phosphorus, na. Pr/cr ratio: 1.5  -FeUrea 27.6%: Pre-renal RITCHIE    #COPD on home 3L O2   -continue with nebs  -continue with O2 target spo2 92%  -Pt is not sob even without oxygen    #Diabetes:   -on insulin, f/u FS    #Fibromyalgia/Chronic pain: Complains of severe pain in the left shoulder and RUQ abdo s/p lipoma resection. Scar is clean, no infection  -on methadone 10mg  -Pt says it does not help her with the pain  -Pt f/u with Dr. Shultz, [place]  -consulted chronic pain managament. Dr Pete's eval appreciated  -Will increase amitriptyline from 10mg to 25mg. Will continue same dose of methadone as repiratory status is compromised as per pain management   -Will keep gabapentin at the same dose till RITCHIE resolves, as per pain management  -Tylenol 625mg Q4hrly PRN for pain   -Lidocaine patches for shoulder and back pain.  -F/u with orthopedics outpt fro suspected OA let shoulder.  -f/u with surgeon outpt for RUQ pain  -Will start bowel regimen if constiopation 2/2 chronic opioid use    #Bipolar disorder  -Continue with buspirone, trazodone    #dyslipidemia   -continue with atorvastatin    #dvt prophylaxis: lovenox  #Diet: consistent carbohydrate/ no snacks  #Dispo home  [facility]: amitriptyline 10mg od, atorvastatin 40mg od, furosemide 40mg od, insulin, entresto 24/26, metroprolol succinate 25mg od, trazadone 50mg od, gabapentin 300mg tds, busprione 10mg bid, symbicort inhaler   Skin:     General: Skin is dry.   Neurological:      General: No focal deficit present.      Mental Status: She is alert.   Psychiatric:         Attention and Perception: Attention normal.         Mood and Affect: Mood normal.         Assessment:       1. Encntr for gyn exam (general) (routine) w/o abn findings    2. Screening for malignant neoplasm of the cervix        Plan:       Patient Instructions   Discussed normal gyn examination.      She will notify me if any postmenopausal bleeding occurs.      Follow-up Pap and HPV testing taken today.      She will continue colonoscopy screening as directed by Gastroenterology.      She will have follow-up mammography on August 28th.  Notify me if she is not heard from results.      Continue routine glucose cholesterol testing by primary care provider     continue calcium with vitamin-D supplements.           7 year female patient with history of CHF with reduced ejection fraction( 20-25%) S/P AICD, COPD on home O2 3L/MIN, diabetes mellitus, CVA with residual right sided weakness, chronic pain, depression, admitted with worsening SOB. Pt was stable this AM. She reports no sob at rest. Pt complained of pain in RUQ and RLQ later. She had a episode of hypotension 84/60 with HR of 69.    #Acute decompensated heart failure with reduced ejection fraction (25-30%):  -Was on lasix 40 IV bid- Will hold lasix for today as the pt has RITCHIE (cr.1.8<-- 2.3<--1.5)  -Can restart lasix when creat is back to baseline  -monitor intake/output  -daily body weights  -continue with metoprolol  -continue 3L o2 via NC  -Held entresto due to hypotensive episode; /57 today. Will continue holding entresto.   -Fluid restriction, keep negative balance     #RITCHIE- pre-renal likely 2/2 overdiuresis vs. hypotensive episode?  -Cr1.8<-- 2.3<--1.5  -Will hold diuretics  -Will hold entresto  -f/u bmp  -will f/u urine urea, phosphorus, na. Pr/cr ratio: 1.5  -FeUrea 27.6%: Pre-renal RITCHIE    #COPD on home 3L O2   -continue with nebs  -continue with O2 target spo2 92%    #Diabetes:   -on insulin, f/u FS    #Fibromyalgia/Chronic pain: Complains of severe pain in the left shoulder and RUQ abdo s/p lipoma resection. Scar is clean, no infection  -on methadone 10mg  -Pt says it does not help her with the pain  -Pt f/u with Dr. Shultz, tried to contact him, he is [place]  -consulted chronic pain managament. Dr Pete's eval appreciated  -Will increase amitriptyline from 10mg to 25mg. Will continue same dose of methadone as repiratory status is compromised as per pain management   -Will keep gabapentin at the same dose till RITCHIE resolves, as per pain management  -Tylenol 625mg Q4hrly PRN for pain   -Lidocaine patches for shoulder and back pain.  -F/u with orthopedics outpt fro suspected OA let shoulder.  -f/u with surgeon outpt for RUQ pain  -Will start bowel regimen if constipation 2/2 chronic opioid use    #Bipolar disorder  -Continue with buspirone, trazodone    #dyslipidemia   -continue with atorvastatin    #dvt prophylaxis: lovenox  #Diet: consistent carbohydrate/ no snacks  #Dispo home. Pt still requires home oxygen due to COPD and CHF with EF 25-30%. Pt sats at 80% on room air when ambulating and sats at 97% with 3L O2 via NC.

## 2023-08-05 DIAGNOSIS — F17.200 SMOKER: ICD-10-CM

## 2023-08-05 DIAGNOSIS — M62.838 SPASM OF MUSCLE: ICD-10-CM

## 2023-08-05 LAB
GH SERPL-MCNC: NORMAL NG/ML
INSULIN SERPL-ACNC: NORMAL U[IU]/ML
LAB AP CLINICAL INFORMATION: NORMAL
LAB AP GYN INTERPRETATION: NEGATIVE
LAB AP PAP DISCLAIMER COMMENTS: NORMAL
RENIN PLAS-CCNC: NORMAL NG/ML/H

## 2023-08-09 LAB
HPV 16: NEGATIVE
HPV 18: NEGATIVE
HPV OTHER: NEGATIVE

## 2023-08-16 RX ORDER — TIZANIDINE 4 MG/1
TABLET ORAL
Qty: 90 TABLET | Refills: 0 | Status: SHIPPED | OUTPATIENT
Start: 2023-08-16 | End: 2023-09-05

## 2023-08-16 RX ORDER — VARENICLINE TARTRATE 1 MG/1
TABLET, FILM COATED ORAL
Qty: 56 TABLET | Refills: 0 | Status: SHIPPED | OUTPATIENT
Start: 2023-08-16 | End: 2023-09-23 | Stop reason: SDUPTHER

## 2023-08-21 ENCOUNTER — OFFICE VISIT (OUTPATIENT)
Dept: FAMILY MEDICINE | Facility: CLINIC | Age: 63
End: 2023-08-21
Payer: MEDICARE

## 2023-08-21 VITALS
WEIGHT: 197 LBS | BODY MASS INDEX: 30.92 KG/M2 | HEART RATE: 55 BPM | TEMPERATURE: 98 F | RESPIRATION RATE: 15 BRPM | OXYGEN SATURATION: 100 % | HEIGHT: 67 IN | SYSTOLIC BLOOD PRESSURE: 160 MMHG | DIASTOLIC BLOOD PRESSURE: 82 MMHG

## 2023-08-21 DIAGNOSIS — I10 HYPERTENSION, UNSPECIFIED TYPE: Primary | ICD-10-CM

## 2023-08-21 PROCEDURE — 1159F MED LIST DOCD IN RCRD: CPT | Mod: ,,, | Performed by: FAMILY MEDICINE

## 2023-08-21 PROCEDURE — 99213 PR OFFICE/OUTPT VISIT, EST, LEVL III, 20-29 MIN: ICD-10-PCS | Mod: ,,, | Performed by: FAMILY MEDICINE

## 2023-08-21 PROCEDURE — 1159F PR MEDICATION LIST DOCUMENTED IN MEDICAL RECORD: ICD-10-PCS | Mod: ,,, | Performed by: FAMILY MEDICINE

## 2023-08-21 PROCEDURE — 3008F PR BODY MASS INDEX (BMI) DOCUMENTED: ICD-10-PCS | Mod: ,,, | Performed by: FAMILY MEDICINE

## 2023-08-21 PROCEDURE — 4010F ACE/ARB THERAPY RXD/TAKEN: CPT | Mod: ,,, | Performed by: FAMILY MEDICINE

## 2023-08-21 PROCEDURE — 99213 OFFICE O/P EST LOW 20 MIN: CPT | Mod: ,,, | Performed by: FAMILY MEDICINE

## 2023-08-21 PROCEDURE — 3008F BODY MASS INDEX DOCD: CPT | Mod: ,,, | Performed by: FAMILY MEDICINE

## 2023-08-21 PROCEDURE — 3079F PR MOST RECENT DIASTOLIC BLOOD PRESSURE 80-89 MM HG: ICD-10-PCS | Mod: ,,, | Performed by: FAMILY MEDICINE

## 2023-08-21 PROCEDURE — 3079F DIAST BP 80-89 MM HG: CPT | Mod: ,,, | Performed by: FAMILY MEDICINE

## 2023-08-21 PROCEDURE — 1160F PR REVIEW ALL MEDS BY PRESCRIBER/CLIN PHARMACIST DOCUMENTED: ICD-10-PCS | Mod: ,,, | Performed by: FAMILY MEDICINE

## 2023-08-21 PROCEDURE — 1160F RVW MEDS BY RX/DR IN RCRD: CPT | Mod: ,,, | Performed by: FAMILY MEDICINE

## 2023-08-21 PROCEDURE — 3077F PR MOST RECENT SYSTOLIC BLOOD PRESSURE >= 140 MM HG: ICD-10-PCS | Mod: ,,, | Performed by: FAMILY MEDICINE

## 2023-08-21 PROCEDURE — 4010F PR ACE/ARB THEARPY RXD/TAKEN: ICD-10-PCS | Mod: ,,, | Performed by: FAMILY MEDICINE

## 2023-08-21 PROCEDURE — 3077F SYST BP >= 140 MM HG: CPT | Mod: ,,, | Performed by: FAMILY MEDICINE

## 2023-08-21 RX ORDER — AMLODIPINE BESYLATE 5 MG/1
5 TABLET ORAL DAILY
Qty: 30 TABLET | Refills: 5 | Status: SHIPPED | OUTPATIENT
Start: 2023-08-21 | End: 2023-09-21

## 2023-08-21 NOTE — PROGRESS NOTES
Janene Castillo is a 63 y.o. female seen today for   Follow-up for abdominal pain.  She reports her symptoms have now resolved after the use of Linzess.  Patient is also discontinued oatmeal and using Cheerios instead with no  recurrence of her symptoms.  Patient's blood pressure is not to goal this morning and we discussed increasing her dose of amlodipine to 5 mg and rechecking her blood pressure log in a month.     Past Medical History:   Diagnosis Date    Allergic contact dermatitis     balsam of peru, bacitracin, fragrance, methyldibromo glutaronite    Anxiety     Chronic pain     HTN (hypertension)     Hyperlipidemia     Plaque psoriasis     Prurigo nodularis     Psoriatic arthritis     Stage 3b chronic kidney disease      Family History   Problem Relation Age of Onset    Hypertension Mother     Heart disease Father     Heart attack Father     Heart failure Father     Pacemaker/defibrilator Father     Melanoma Neg Hx      Current Outpatient Medications on File Prior to Visit   Medication Sig Dispense Refill    benazepriL (LOTENSIN) 20 MG tablet Take 1 tablet by mouth once daily 90 tablet 0    buPROPion (WELLBUTRIN) 100 MG tablet Take 1 tablet (100 mg total) by mouth 2 (two) times daily. 180 tablet 1    calcium carbonate (OS-CAIN) 600 mg calcium (1,500 mg) Tab Take 600 mg by mouth once daily.      coenzyme Q10 100 mg capsule as directed Orally      ezetimibe (ZETIA) 10 mg tablet Take 1 tablet (10 mg total) by mouth every evening. 90 tablet 0    famotidine (PEPCID) 20 MG tablet Take 1 tablet (20 mg total) by mouth 2 (two) times daily. 180 tablet 1    linaCLOtide (LINZESS) 145 mcg Cap capsule Take 1 capsule (145 mcg total) by mouth before breakfast. 30 capsule 3    pitavastatin calcium (LIVALO) 2 mg Tab tablet Take 1 mg by mouth every evening.      RINVOQ 15 mg 24 hr tablet Take 15 mg by mouth once daily.      tiZANidine (ZANAFLEX) 4 MG tablet Take 1 tablet by mouth twice daily 90 tablet 0    valACYclovir (VALTREX)  1000 MG tablet Take 2 tablets b.i.d. x1 day as needed for a cold sore 4 tablet 11    valACYclovir (VALTREX) 500 MG tablet Take 1 tablet (500 mg total) by mouth once daily. 30 tablet 11    varenicline (CHANTIX) 1 mg Tab Take 1 tablet by mouth twice daily 56 tablet 0    zinc gluconate 50 mg tablet Take 50 mg by mouth once daily.      [DISCONTINUED] amLODIPine (NORVASC) 2.5 MG tablet Take 1 tablet (2.5 mg total) by mouth once daily. 90 tablet 1    [DISCONTINUED] oxyCODONE-acetaminophen (PERCOCET)  mg per tablet Take 1 tablet by mouth every 4 (four) hours as needed for Pain. (Patient not taking: Reported on 8/3/2023) 2 tablet 0     No current facility-administered medications on file prior to visit.     Immunization History   Administered Date(s) Administered    COVID-19 MRNA, LN-S PF (MODERNA HALF 0.25 ML DOSE) 04/26/2022    COVID-19, MRNA, LN-S, PF (MODERNA FULL 0.5 ML DOSE) 06/22/2021, 08/06/2021    COVID-19, mRNA, LNP-S, bivalent booster, PF (Moderna Omicron) 11/16/2022       Review of Systems   Constitutional:  Negative for fever, malaise/fatigue and weight loss.   Respiratory:  Negative for shortness of breath.    Cardiovascular:  Negative for chest pain and palpitations.   Gastrointestinal:  Negative for nausea and vomiting.   Psychiatric/Behavioral:  Negative for depression.         Vitals:    08/21/23 0854   BP: (!) 160/82   Pulse:    Resp:    Temp:        Physical Exam  Vitals reviewed.   Constitutional:       Appearance: Normal appearance.   HENT:      Head: Normocephalic.   Eyes:      Extraocular Movements: Extraocular movements intact.      Conjunctiva/sclera: Conjunctivae normal.      Pupils: Pupils are equal, round, and reactive to light.   Neck:      Thyroid: No thyroid mass or thyromegaly.   Cardiovascular:      Rate and Rhythm: Normal rate and regular rhythm.      Heart sounds: Normal heart sounds. No murmur heard.     No gallop.   Pulmonary:      Effort: Pulmonary effort is normal. No  respiratory distress.      Breath sounds: Normal breath sounds. No wheezing or rales.   Skin:     General: Skin is warm and dry.      Coloration: Skin is not jaundiced or pale.   Neurological:      Mental Status: She is alert.   Psychiatric:         Mood and Affect: Mood normal.         Behavior: Behavior normal.         Thought Content: Thought content normal.         Judgment: Judgment normal.          Assessment and Plan  Hypertension, unspecified type  -     amLODIPine (NORVASC) 5 MG tablet; Take 1 tablet (5 mg total) by mouth once daily.  Dispense: 30 tablet; Refill: 5            Return to clinic in   One month with home blood pressure log or as needed.    Health Maintenance Topics with due status: Not Due       Topic Last Completion Date    Colorectal Cancer Screening 12/22/2020    Lipid Panel 06/30/2023    Cervical Cancer Screening 08/03/2023    Influenza Vaccine Not Due

## 2023-08-28 ENCOUNTER — HOSPITAL ENCOUNTER (OUTPATIENT)
Dept: RADIOLOGY | Facility: HOSPITAL | Age: 63
Discharge: HOME | End: 2023-08-28
Attending: FAMILY MEDICINE
Payer: MEDICARE

## 2023-08-28 DIAGNOSIS — Z12.31 OTHER SCREENING MAMMOGRAM: ICD-10-CM

## 2023-08-28 PROCEDURE — 77067 SCR MAMMO BI INCL CAD: CPT | Mod: TC

## 2023-08-29 ENCOUNTER — TELEPHONE (OUTPATIENT)
Dept: FAMILY MEDICINE | Facility: CLINIC | Age: 63
End: 2023-08-29
Payer: MEDICARE

## 2023-08-29 NOTE — TELEPHONE ENCOUNTER
----- Message from Milly Abbott MA sent at 8/28/2023  2:37 PM CDT -----  Please give pt a call back concerning her medication.  She can be reached at .

## 2023-08-30 ENCOUNTER — TELEPHONE (OUTPATIENT)
Dept: FAMILY MEDICINE | Facility: CLINIC | Age: 63
End: 2023-08-30
Payer: MEDICARE

## 2023-08-30 NOTE — TELEPHONE ENCOUNTER
----- Message from Asael Huizar MD sent at 8/28/2023  5:23 PM CDT -----  Negative mammogram repeat in 1 year.

## 2023-08-31 ENCOUNTER — EXTERNAL CHRONIC CARE MANAGEMENT (OUTPATIENT)
Dept: FAMILY MEDICINE | Facility: CLINIC | Age: 63
End: 2023-08-31
Payer: MEDICARE

## 2023-08-31 DIAGNOSIS — I10 HYPERTENSION, UNSPECIFIED TYPE: ICD-10-CM

## 2023-08-31 DIAGNOSIS — M62.838 SPASM OF MUSCLE: ICD-10-CM

## 2023-08-31 PROCEDURE — G0511 CCM/BHI BY RHC/FQHC 20MIN MO: HCPCS | Mod: ,,, | Performed by: FAMILY MEDICINE

## 2023-08-31 PROCEDURE — G0511 PR CHRONIC CARE MGMT, RHC OR FQHC ONLY, 20 MINS OR MORE: ICD-10-PCS | Mod: ,,, | Performed by: FAMILY MEDICINE

## 2023-09-04 DIAGNOSIS — L30.9 DERMATITIS, UNSPECIFIED: ICD-10-CM

## 2023-09-05 RX ORDER — BENAZEPRIL HYDROCHLORIDE 20 MG/1
20 TABLET ORAL DAILY
Qty: 90 TABLET | Refills: 1 | Status: SHIPPED | OUTPATIENT
Start: 2023-09-05 | End: 2024-02-13

## 2023-09-05 RX ORDER — TIZANIDINE 4 MG/1
TABLET ORAL
Qty: 90 TABLET | Refills: 0 | Status: SHIPPED | OUTPATIENT
Start: 2023-09-05 | End: 2024-01-02 | Stop reason: SDUPTHER

## 2023-09-07 DIAGNOSIS — L40.9 PSORIASIS: Primary | ICD-10-CM

## 2023-09-07 RX ORDER — CALCIPOTRIENE, BETAMETHASONE DIPROPIONATE 50; .643 UG/G; MG/G
OINTMENT TOPICAL
Qty: 60 G | Refills: 0 | Status: SHIPPED | OUTPATIENT
Start: 2023-09-07 | End: 2023-09-13 | Stop reason: SDUPTHER

## 2023-09-13 ENCOUNTER — TELEPHONE (OUTPATIENT)
Dept: DERMATOLOGY | Facility: CLINIC | Age: 63
End: 2023-09-13
Payer: MEDICARE

## 2023-09-13 DIAGNOSIS — L30.9 DERMATITIS, UNSPECIFIED: ICD-10-CM

## 2023-09-13 RX ORDER — CALCIPOTRIENE, BETAMETHASONE DIPROPIONATE 50; .643 UG/G; MG/G
OINTMENT TOPICAL
Qty: 60 G | Refills: 6 | Status: SHIPPED | OUTPATIENT
Start: 2023-09-13 | End: 2023-10-24 | Stop reason: SDUPTHER

## 2023-09-13 NOTE — TELEPHONE ENCOUNTER
Pt requested refill on taclonex sent to [facility]     ----- Message from Abelino Mckeon RN sent at 9/13/2023  3:55 PM CDT -----    ----- Message -----  From: Nelly Barreto CMA  Sent: 9/13/2023   8:40 AM CDT  To: Vicky STINSON Staff    PT called and is needing to speak with Dr. Perry because she claims this is the third message she has left and got no return call back.     [phone]

## 2023-09-18 ENCOUNTER — OFFICE VISIT (OUTPATIENT)
Dept: DERMATOLOGY | Facility: CLINIC | Age: 63
End: 2023-09-18
Payer: MEDICARE

## 2023-09-18 DIAGNOSIS — L08.9 SKIN INFECTION: Primary | ICD-10-CM

## 2023-09-18 PROCEDURE — 1160F PR REVIEW ALL MEDS BY PRESCRIBER/CLIN PHARMACIST DOCUMENTED: ICD-10-PCS | Mod: CPTII,,, | Performed by: DERMATOLOGY

## 2023-09-18 PROCEDURE — 99213 OFFICE O/P EST LOW 20 MIN: CPT | Mod: ,,, | Performed by: DERMATOLOGY

## 2023-09-18 PROCEDURE — 1159F PR MEDICATION LIST DOCUMENTED IN MEDICAL RECORD: ICD-10-PCS | Mod: CPTII,,, | Performed by: DERMATOLOGY

## 2023-09-18 PROCEDURE — 4010F ACE/ARB THERAPY RXD/TAKEN: CPT | Mod: CPTII,,, | Performed by: DERMATOLOGY

## 2023-09-18 PROCEDURE — 87186 SC STD MICRODIL/AGAR DIL: CPT | Mod: ,,, | Performed by: CLINICAL MEDICAL LABORATORY

## 2023-09-18 PROCEDURE — 87070 CULTURE, WOUND: ICD-10-PCS | Mod: ,,, | Performed by: CLINICAL MEDICAL LABORATORY

## 2023-09-18 PROCEDURE — 4010F PR ACE/ARB THEARPY RXD/TAKEN: ICD-10-PCS | Mod: CPTII,,, | Performed by: DERMATOLOGY

## 2023-09-18 PROCEDURE — 1159F MED LIST DOCD IN RCRD: CPT | Mod: CPTII,,, | Performed by: DERMATOLOGY

## 2023-09-18 PROCEDURE — 99213 PR OFFICE/OUTPT VISIT, EST, LEVL III, 20-29 MIN: ICD-10-PCS | Mod: ,,, | Performed by: DERMATOLOGY

## 2023-09-18 PROCEDURE — 87077 CULTURE AEROBIC IDENTIFY: CPT | Mod: XU,,, | Performed by: CLINICAL MEDICAL LABORATORY

## 2023-09-18 PROCEDURE — 1160F RVW MEDS BY RX/DR IN RCRD: CPT | Mod: CPTII,,, | Performed by: DERMATOLOGY

## 2023-09-18 PROCEDURE — 87077 CULTURE, WOUND: ICD-10-PCS | Mod: XU,,, | Performed by: CLINICAL MEDICAL LABORATORY

## 2023-09-18 PROCEDURE — 87070 CULTURE OTHR SPECIMN AEROBIC: CPT | Mod: ,,, | Performed by: CLINICAL MEDICAL LABORATORY

## 2023-09-18 PROCEDURE — 87186 CULTURE, WOUND: ICD-10-PCS | Mod: ,,, | Performed by: CLINICAL MEDICAL LABORATORY

## 2023-09-18 RX ORDER — MUPIROCIN 20 MG/G
OINTMENT TOPICAL
Qty: 30 G | Refills: 2 | Status: SHIPPED | OUTPATIENT
Start: 2023-09-18

## 2023-09-18 RX ORDER — DOXYCYCLINE 100 MG/1
CAPSULE ORAL
Qty: 20 CAPSULE | Refills: 0 | Status: SHIPPED | OUTPATIENT
Start: 2023-09-18 | End: 2023-10-02 | Stop reason: SDUPTHER

## 2023-09-18 NOTE — PROGRESS NOTES
[facility]   Tatum Perry MD    Patient Name: Janene Castillo  Patient YOB: 1960   Date of Service: 23    CC: Lesion    HPI: Janene Castillo is a 63 y.o. female here today for lesion, located on the left hand.  Lesion has been present for 1 weeks.  Previous treatments include mupirocin.      Past Medical History:   Diagnosis Date    Allergic contact dermatitis     balsam of peru, bacitracin, fragrance, methyldibromo glutaronite    Anxiety     Chronic pain     HTN (hypertension)     Hyperlipidemia     Plaque psoriasis     Prurigo nodularis     Psoriatic arthritis     Stage 3b chronic kidney disease      Past Surgical History:   Procedure Laterality Date    ANGIOGRAM, CORONARY, WITH LEFT HEART CATHETERIZATION N/A 2022    Procedure: Angiogram, Coronary, with Left Heart Cath;  Surgeon: Jason Ratliff MD;  Location: [facility] CATH LAB;  Service: Cardiology;  Laterality: N/A;     SECTION       Review of patient's allergies indicates:   Allergen Reactions    Bacitracin Dermatitis    Codeine sulfate     Codeine Rash     Rash     Methyldibromoglutaronitrile Dermatitis    Permethrin Rash     Rash     Sulfa (sulfonamide antibiotics) Other (See Comments) and Rash     unknown       Current Outpatient Medications:     amLODIPine (NORVASC) 5 MG tablet, Take 1 tablet (5 mg total) by mouth once daily., Disp: 30 tablet, Rfl: 5    benazepriL (LOTENSIN) 20 MG tablet, Take 1 tablet (20 mg total) by mouth once daily., Disp: 90 tablet, Rfl: 1    buPROPion (WELLBUTRIN) 100 MG tablet, Take 1 tablet (100 mg total) by mouth 2 (two) times daily., Disp: 180 tablet, Rfl: 1    calcipotriene-betamethasone (TACLONEX) ointment, APPLY   TOPICALLY TO AFFECTED AREA TWICE DAILY. TAPERING WITH IMPROVEMENT, Disp: 60 g, Rfl: 6    calcium carbonate (OS-CAIN) 600 mg calcium (1,500 mg) Tab, Take 600 mg by mouth once daily., Disp: , Rfl:     coenzyme Q10 100 mg capsule, as directed Orally, Disp: , Rfl:     doxycycline  (VIBRAMYCIN) 100 MG Cap, Take one capsule by mouth twice daily. WEAR SUNSCREEN WHILE TAKING., Disp: 20 capsule, Rfl: 0    ezetimibe (ZETIA) 10 mg tablet, Take 1 tablet (10 mg total) by mouth every evening., Disp: 90 tablet, Rfl: 0    famotidine (PEPCID) 20 MG tablet, Take 1 tablet (20 mg total) by mouth 2 (two) times daily., Disp: 180 tablet, Rfl: 1    linaCLOtide (LINZESS) 145 mcg Cap capsule, Take 1 capsule (145 mcg total) by mouth before breakfast., Disp: 30 capsule, Rfl: 3    mupirocin (BACTROBAN) 2 % ointment, Apply to affected areas TID, Disp: 30 g, Rfl: 2    pitavastatin calcium (LIVALO) 2 mg Tab tablet, Take 1 mg by mouth every evening., Disp: , Rfl:     RINVOQ 15 mg 24 hr tablet, Take 15 mg by mouth once daily., Disp: , Rfl:     tiZANidine (ZANAFLEX) 4 MG tablet, Take 1 tablet by mouth twice daily, Disp: 90 tablet, Rfl: 0    valACYclovir (VALTREX) 1000 MG tablet, Take 2 tablets b.i.d. x1 day as needed for a cold sore, Disp: 4 tablet, Rfl: 11    valACYclovir (VALTREX) 500 MG tablet, Take 1 tablet (500 mg total) by mouth once daily., Disp: 30 tablet, Rfl: 11    varenicline (CHANTIX) 1 mg Tab, Take 1 tablet by mouth twice daily, Disp: 56 tablet, Rfl: 0    zinc gluconate 50 mg tablet, Take 50 mg by mouth once daily., Disp: , Rfl:     ROS: A focused review of systems was obtained and negative.     Exam: A focused skin exam was performed. All areas examined were normal except as mentioned in the assessment and plan below.  General Appearance of the patient is well developed and well nourished.  Orientation: alert and oriented x 3.  Mood and affect: pleasant.    Assessment:   The encounter diagnosis was Skin infection.    Plan:   Medications Ordered This Encounter   Medications    doxycycline (VIBRAMYCIN) 100 MG Cap     Sig: Take one capsule by mouth twice daily. WEAR SUNSCREEN WHILE TAKING.     Dispense:  20 capsule     Refill:  0    mupirocin (BACTROBAN) 2 % ointment     Sig: Apply to affected areas TID      Dispense:  30 g     Refill:  2     MUST BE TARO  DO TO PT ALLERGY       Skin Infection, NOS   - crusted excoriations on the forearms and left hand    Plan: Counseling  I counseled the patient regarding the following:  Skin care: Patients with purulence or fluid collections should have their wounds re-opened, drained, cultured and irrigated. All wound infections should be treated with antibiotics.  Expectations: Wound Infections usually occur 4-7 days postoperatively. Patients exhibit, pain, rednes, swelling, cellulitic changes and fever.  Contact Office if: Wound Infection fails to respond to treatment or worsens, patient develops a fever, or if redness spreads despite antibiotics.    A bacterial culture was obtained from the left hand    - Will send in Mupirocin (taro which is on her [facility]AMP list) and doxycycline (hx of MRSA)       Follow up if symptoms worsen or fail to improve.    Tatum Perry MD

## 2023-09-21 ENCOUNTER — OFFICE VISIT (OUTPATIENT)
Dept: FAMILY MEDICINE | Facility: CLINIC | Age: 63
End: 2023-09-21
Payer: MEDICARE

## 2023-09-21 VITALS
WEIGHT: 196 LBS | DIASTOLIC BLOOD PRESSURE: 72 MMHG | OXYGEN SATURATION: 99 % | BODY MASS INDEX: 30.76 KG/M2 | HEART RATE: 60 BPM | HEIGHT: 67 IN | RESPIRATION RATE: 18 BRPM | SYSTOLIC BLOOD PRESSURE: 112 MMHG

## 2023-09-21 DIAGNOSIS — I10 HYPERTENSION, UNSPECIFIED TYPE: Primary | ICD-10-CM

## 2023-09-21 LAB
MICROORGANISM SPEC CULT: ABNORMAL
MICROORGANISM SPEC CULT: ABNORMAL

## 2023-09-21 PROCEDURE — 3078F DIAST BP <80 MM HG: CPT | Mod: ,,, | Performed by: FAMILY MEDICINE

## 2023-09-21 PROCEDURE — 1160F RVW MEDS BY RX/DR IN RCRD: CPT | Mod: ,,, | Performed by: FAMILY MEDICINE

## 2023-09-21 PROCEDURE — 1159F PR MEDICATION LIST DOCUMENTED IN MEDICAL RECORD: ICD-10-PCS | Mod: ,,, | Performed by: FAMILY MEDICINE

## 2023-09-21 PROCEDURE — 99212 PR OFFICE/OUTPT VISIT, EST, LEVL II, 10-19 MIN: ICD-10-PCS | Mod: ,,, | Performed by: FAMILY MEDICINE

## 2023-09-21 PROCEDURE — 1159F MED LIST DOCD IN RCRD: CPT | Mod: ,,, | Performed by: FAMILY MEDICINE

## 2023-09-21 PROCEDURE — 3008F PR BODY MASS INDEX (BMI) DOCUMENTED: ICD-10-PCS | Mod: ,,, | Performed by: FAMILY MEDICINE

## 2023-09-21 PROCEDURE — 4010F ACE/ARB THERAPY RXD/TAKEN: CPT | Mod: ,,, | Performed by: FAMILY MEDICINE

## 2023-09-21 PROCEDURE — 3074F PR MOST RECENT SYSTOLIC BLOOD PRESSURE < 130 MM HG: ICD-10-PCS | Mod: ,,, | Performed by: FAMILY MEDICINE

## 2023-09-21 PROCEDURE — 99212 OFFICE O/P EST SF 10 MIN: CPT | Mod: ,,, | Performed by: FAMILY MEDICINE

## 2023-09-21 PROCEDURE — 4010F PR ACE/ARB THEARPY RXD/TAKEN: ICD-10-PCS | Mod: ,,, | Performed by: FAMILY MEDICINE

## 2023-09-21 PROCEDURE — 3078F PR MOST RECENT DIASTOLIC BLOOD PRESSURE < 80 MM HG: ICD-10-PCS | Mod: ,,, | Performed by: FAMILY MEDICINE

## 2023-09-21 PROCEDURE — 1160F PR REVIEW ALL MEDS BY PRESCRIBER/CLIN PHARMACIST DOCUMENTED: ICD-10-PCS | Mod: ,,, | Performed by: FAMILY MEDICINE

## 2023-09-21 PROCEDURE — 3074F SYST BP LT 130 MM HG: CPT | Mod: ,,, | Performed by: FAMILY MEDICINE

## 2023-09-21 PROCEDURE — 3008F BODY MASS INDEX DOCD: CPT | Mod: ,,, | Performed by: FAMILY MEDICINE

## 2023-09-21 RX ORDER — AMLODIPINE BESYLATE 2.5 MG/1
2.5 TABLET ORAL DAILY
COMMUNITY
End: 2024-02-24 | Stop reason: SDUPTHER

## 2023-09-21 NOTE — PROGRESS NOTES
Janene Castillo is a 63 y.o. female seen today for follow-up on her hypertension.  Initially she tried the 5 mg of amlodipine alone with his made her lightheaded so she went bacteria benazepril 2.5 mg dose of amlodipine.  Since, her blood pressures have been well controlled and she is had no medication side effects.  She denies any chest pain or shortness a breath and defers the flu vaccination today.      Past Medical History:   Diagnosis Date    Allergic contact dermatitis     balsam of peru, bacitracin, fragrance, methyldibromo glutaronite    Anxiety     Chronic pain     HTN (hypertension)     Hyperlipidemia     Plaque psoriasis     Prurigo nodularis     Psoriatic arthritis     Stage 3b chronic kidney disease      Family History   Problem Relation Age of Onset    Hypertension Mother     Heart disease Father     Heart attack Father     Heart failure Father     Pacemaker/defibrilator Father     Melanoma Neg Hx      Current Outpatient Medications on File Prior to Visit   Medication Sig Dispense Refill    amLODIPine (NORVASC) 2.5 MG tablet Take 2.5 mg by mouth once daily.      benazepriL (LOTENSIN) 20 MG tablet Take 1 tablet (20 mg total) by mouth once daily. 90 tablet 1    buPROPion (WELLBUTRIN) 100 MG tablet Take 1 tablet (100 mg total) by mouth 2 (two) times daily. 180 tablet 1    calcipotriene-betamethasone (TACLONEX) ointment APPLY   TOPICALLY TO AFFECTED AREA TWICE DAILY. TAPERING WITH IMPROVEMENT 60 g 6    calcium carbonate (OS-CAIN) 600 mg calcium (1,500 mg) Tab Take 600 mg by mouth once daily.      coenzyme Q10 100 mg capsule as directed Orally      doxycycline (VIBRAMYCIN) 100 MG Cap Take one capsule by mouth twice daily. WEAR SUNSCREEN WHILE TAKING. 20 capsule 0    ezetimibe (ZETIA) 10 mg tablet Take 1 tablet (10 mg total) by mouth every evening. 90 tablet 0    famotidine (PEPCID) 20 MG tablet Take 1 tablet (20 mg total) by mouth 2 (two) times daily. 180 tablet 1    linaCLOtide (LINZESS) 145 mcg Cap capsule  Take 1 capsule (145 mcg total) by mouth before breakfast. 30 capsule 3    mupirocin (BACTROBAN) 2 % ointment Apply to affected areas TID 30 g 2    RINVOQ 15 mg 24 hr tablet Take 15 mg by mouth once daily.      tiZANidine (ZANAFLEX) 4 MG tablet Take 1 tablet by mouth twice daily 90 tablet 0    valACYclovir (VALTREX) 1000 MG tablet Take 2 tablets b.i.d. x1 day as needed for a cold sore 4 tablet 11    valACYclovir (VALTREX) 500 MG tablet Take 1 tablet (500 mg total) by mouth once daily. 30 tablet 11    varenicline (CHANTIX) 1 mg Tab Take 1 tablet by mouth twice daily 56 tablet 0    zinc gluconate 50 mg tablet Take 50 mg by mouth once daily.      [DISCONTINUED] amLODIPine (NORVASC) 5 MG tablet Take 1 tablet (5 mg total) by mouth once daily. (Patient not taking: Reported on 9/21/2023) 30 tablet 5    [DISCONTINUED] pitavastatin calcium (LIVALO) 2 mg Tab tablet Take 1 mg by mouth every evening.       No current facility-administered medications on file prior to visit.     Immunization History   Administered Date(s) Administered    COVID-19 MRNA, LN-S PF (MODERNA HALF 0.25 ML DOSE) 04/26/2022    COVID-19, MRNA, LN-S, PF (MODERNA FULL 0.5 ML DOSE) 06/22/2021, 08/06/2021    COVID-19, mRNA, LNP-S, bivalent booster, PF (Moderna Omicron) 11/16/2022       Review of Systems   Constitutional:  Negative for fever, malaise/fatigue and weight loss.   Respiratory:  Negative for shortness of breath.    Cardiovascular:  Negative for chest pain and palpitations.   Gastrointestinal:  Negative for nausea and vomiting.   Psychiatric/Behavioral:  Negative for depression.         Vitals:    09/21/23 0848   BP: 112/72   Pulse: 60   Resp: 18       Physical Exam  Vitals reviewed.   Constitutional:       Appearance: Normal appearance.   HENT:      Head: Normocephalic.   Eyes:      Extraocular Movements: Extraocular movements intact.      Conjunctiva/sclera: Conjunctivae normal.      Pupils: Pupils are equal, round, and reactive to light.   Neck:       Thyroid: No thyroid mass or thyromegaly.   Cardiovascular:      Rate and Rhythm: Normal rate and regular rhythm.      Heart sounds: Normal heart sounds. No murmur heard.     No gallop.   Pulmonary:      Effort: Pulmonary effort is normal. No respiratory distress.      Breath sounds: Normal breath sounds. No wheezing or rales.   Skin:     General: Skin is warm and dry.      Coloration: Skin is not jaundiced or pale.   Neurological:      Mental Status: She is alert.   Psychiatric:         Mood and Affect: Mood normal.         Behavior: Behavior normal.         Thought Content: Thought content normal.         Judgment: Judgment normal.          Assessment and Plan  Hypertension, unspecified type            Return to clinic in January for follow-up on her lipids are as needed.    Health Maintenance Topics with due status: Not Due       Topic Last Completion Date    Colorectal Cancer Screening 12/22/2020    Lipid Panel 06/30/2023    Cervical Cancer Screening 08/03/2023    Mammogram 08/28/2023

## 2023-09-22 DIAGNOSIS — F17.200 SMOKER: ICD-10-CM

## 2023-09-23 RX ORDER — VARENICLINE TARTRATE 1 MG/1
TABLET, FILM COATED ORAL
Qty: 56 TABLET | Refills: 0 | Status: SHIPPED | OUTPATIENT
Start: 2023-09-23 | End: 2023-11-27 | Stop reason: SDUPTHER

## 2023-09-30 ENCOUNTER — EXTERNAL CHRONIC CARE MANAGEMENT (OUTPATIENT)
Dept: FAMILY MEDICINE | Facility: CLINIC | Age: 63
End: 2023-09-30
Payer: MEDICARE

## 2023-09-30 PROCEDURE — G0511 PR CHRONIC CARE MGMT, RHC OR FQHC ONLY, 20 MINS OR MORE: ICD-10-PCS | Mod: ,,, | Performed by: FAMILY MEDICINE

## 2023-09-30 PROCEDURE — G0511 CCM/BHI BY RHC/FQHC 20MIN MO: HCPCS | Mod: ,,, | Performed by: FAMILY MEDICINE

## 2023-10-02 DIAGNOSIS — L08.9 SKIN INFECTION: ICD-10-CM

## 2023-10-02 RX ORDER — DOXYCYCLINE 100 MG/1
CAPSULE ORAL
Qty: 28 CAPSULE | Refills: 0 | Status: SHIPPED | OUTPATIENT
Start: 2023-10-02 | End: 2024-03-08 | Stop reason: SDUPTHER

## 2023-10-03 DIAGNOSIS — L30.9 DERMATITIS, UNSPECIFIED: ICD-10-CM

## 2023-10-24 RX ORDER — CALCIPOTRIENE, BETAMETHASONE DIPROPIONATE 50; .643 UG/G; MG/G
OINTMENT TOPICAL
Qty: 100 G | Refills: 1 | Status: SHIPPED | OUTPATIENT
Start: 2023-10-24

## 2023-10-31 ENCOUNTER — EXTERNAL CHRONIC CARE MANAGEMENT (OUTPATIENT)
Dept: FAMILY MEDICINE | Facility: CLINIC | Age: 63
End: 2023-10-31
Payer: MEDICARE

## 2023-10-31 PROCEDURE — G0511 PR CHRONIC CARE MGMT, RHC OR FQHC ONLY, 20 MINS OR MORE: ICD-10-PCS | Mod: ,,, | Performed by: FAMILY MEDICINE

## 2023-10-31 PROCEDURE — G0511 CCM/BHI BY RHC/FQHC 20MIN MO: HCPCS | Mod: ,,, | Performed by: FAMILY MEDICINE

## 2023-11-07 DIAGNOSIS — E78.5 HYPERLIPIDEMIA, UNSPECIFIED HYPERLIPIDEMIA TYPE: ICD-10-CM

## 2023-11-10 RX ORDER — EZETIMIBE 10 MG/1
10 TABLET ORAL NIGHTLY
Qty: 90 TABLET | Refills: 1 | Status: SHIPPED | OUTPATIENT
Start: 2023-11-10 | End: 2024-02-24 | Stop reason: SDUPTHER

## 2023-11-10 RX ORDER — OXYCODONE AND ACETAMINOPHEN 10; 325 MG/1; MG/1
1 TABLET ORAL 3 TIMES DAILY PRN
COMMUNITY
Start: 2023-10-13

## 2023-11-27 DIAGNOSIS — F17.200 SMOKER: ICD-10-CM

## 2023-11-27 RX ORDER — VARENICLINE TARTRATE 1 MG/1
1 TABLET, FILM COATED ORAL 2 TIMES DAILY
Qty: 56 TABLET | Refills: 0 | Status: SHIPPED | OUTPATIENT
Start: 2023-11-27 | End: 2023-12-14 | Stop reason: SDUPTHER

## 2023-11-30 ENCOUNTER — EXTERNAL CHRONIC CARE MANAGEMENT (OUTPATIENT)
Dept: FAMILY MEDICINE | Facility: CLINIC | Age: 63
End: 2023-11-30
Payer: MEDICARE

## 2023-11-30 PROCEDURE — G0511 PR CHRONIC CARE MGMT, RHC OR FQHC ONLY, 20 MINS OR MORE: ICD-10-PCS | Mod: ,,, | Performed by: FAMILY MEDICINE

## 2023-11-30 PROCEDURE — G0511 CCM/BHI BY RHC/FQHC 20MIN MO: HCPCS | Mod: ,,, | Performed by: FAMILY MEDICINE

## 2023-12-14 DIAGNOSIS — F17.200 SMOKER: ICD-10-CM

## 2023-12-14 RX ORDER — VARENICLINE TARTRATE 1 MG/1
1 TABLET, FILM COATED ORAL 2 TIMES DAILY
Qty: 56 TABLET | Refills: 1 | Status: SHIPPED | OUTPATIENT
Start: 2023-12-14 | End: 2024-01-15 | Stop reason: SDUPTHER

## 2023-12-31 ENCOUNTER — EXTERNAL CHRONIC CARE MANAGEMENT (OUTPATIENT)
Dept: FAMILY MEDICINE | Facility: CLINIC | Age: 63
End: 2023-12-31
Payer: MEDICARE

## 2023-12-31 PROCEDURE — G0511 CCM/BHI BY RHC/FQHC 20MIN MO: HCPCS | Mod: ,,, | Performed by: FAMILY MEDICINE

## 2024-01-02 ENCOUNTER — OFFICE VISIT (OUTPATIENT)
Dept: FAMILY MEDICINE | Facility: CLINIC | Age: 64
End: 2024-01-02
Payer: MEDICARE

## 2024-01-02 VITALS
RESPIRATION RATE: 18 BRPM | DIASTOLIC BLOOD PRESSURE: 70 MMHG | OXYGEN SATURATION: 98 % | HEART RATE: 59 BPM | TEMPERATURE: 98 F | SYSTOLIC BLOOD PRESSURE: 120 MMHG | BODY MASS INDEX: 30.13 KG/M2 | WEIGHT: 192 LBS | HEIGHT: 67 IN

## 2024-01-02 DIAGNOSIS — I10 HYPERTENSION, UNSPECIFIED TYPE: Primary | ICD-10-CM

## 2024-01-02 DIAGNOSIS — F32.A DEPRESSION, UNSPECIFIED DEPRESSION TYPE: ICD-10-CM

## 2024-01-02 DIAGNOSIS — N18.32 STAGE 3B CHRONIC KIDNEY DISEASE: ICD-10-CM

## 2024-01-02 DIAGNOSIS — K21.9 GASTROESOPHAGEAL REFLUX DISEASE, UNSPECIFIED WHETHER ESOPHAGITIS PRESENT: ICD-10-CM

## 2024-01-02 DIAGNOSIS — M62.838 SPASM OF MUSCLE: ICD-10-CM

## 2024-01-02 DIAGNOSIS — E78.5 HYPERLIPIDEMIA, UNSPECIFIED HYPERLIPIDEMIA TYPE: ICD-10-CM

## 2024-01-02 DIAGNOSIS — L40.50 PSORIATIC ARTHRITIS: ICD-10-CM

## 2024-01-02 PROCEDURE — 3008F BODY MASS INDEX DOCD: CPT | Mod: ,,, | Performed by: FAMILY MEDICINE

## 2024-01-02 PROCEDURE — 3078F DIAST BP <80 MM HG: CPT | Mod: ,,, | Performed by: FAMILY MEDICINE

## 2024-01-02 PROCEDURE — 99214 OFFICE O/P EST MOD 30 MIN: CPT | Mod: ,,, | Performed by: FAMILY MEDICINE

## 2024-01-02 PROCEDURE — 3074F SYST BP LT 130 MM HG: CPT | Mod: ,,, | Performed by: FAMILY MEDICINE

## 2024-01-02 PROCEDURE — 1160F RVW MEDS BY RX/DR IN RCRD: CPT | Mod: ,,, | Performed by: FAMILY MEDICINE

## 2024-01-02 PROCEDURE — 1159F MED LIST DOCD IN RCRD: CPT | Mod: ,,, | Performed by: FAMILY MEDICINE

## 2024-01-02 RX ORDER — FAMOTIDINE 20 MG/1
20 TABLET, FILM COATED ORAL 2 TIMES DAILY
Qty: 180 TABLET | Refills: 1 | Status: SHIPPED | OUTPATIENT
Start: 2024-01-02

## 2024-01-02 RX ORDER — TIZANIDINE 4 MG/1
4 TABLET ORAL 2 TIMES DAILY
Qty: 90 TABLET | Refills: 0 | Status: SHIPPED | OUTPATIENT
Start: 2024-01-02 | End: 2024-02-13

## 2024-01-02 RX ORDER — BUPROPION HYDROCHLORIDE 100 MG/1
100 TABLET ORAL 2 TIMES DAILY
Qty: 180 TABLET | Refills: 1 | Status: SHIPPED | OUTPATIENT
Start: 2024-01-02

## 2024-01-02 NOTE — ASSESSMENT & PLAN NOTE
We will continue current plan and continue to monitor renal function.  Patient will be reminded to avoid nephrotoxic drugs

## 2024-01-02 NOTE — PROGRESS NOTES
Janene Castillo is a 63 y.o. female seen today for follow-up on hypertension and hyperlipidemia.  She has not fasting today but will return to the clinic fasting in the next few days.  She denies any chest pain or shortness a breath and is active in meeting her ADLs.  Patient defers the flu vaccination today.      Past Medical History:   Diagnosis Date    Allergic contact dermatitis     balsam of peru, bacitracin, fragrance, methyldibromo glutaronite    Anxiety     Chronic pain     HTN (hypertension)     Hyperlipidemia     Plaque psoriasis     Prurigo nodularis     Psoriatic arthritis     Stage 3b chronic kidney disease      Family History   Problem Relation Age of Onset    Hypertension Mother     Heart disease Father     Heart attack Father     Heart failure Father     Pacemaker/defibrilator Father     Melanoma Neg Hx      Current Outpatient Medications on File Prior to Visit   Medication Sig Dispense Refill    amLODIPine (NORVASC) 2.5 MG tablet Take 2.5 mg by mouth once daily.      benazepriL (LOTENSIN) 20 MG tablet Take 1 tablet (20 mg total) by mouth once daily. 90 tablet 1    calcipotriene-betamethasone (TACLONEX) ointment Apply topically to affected area twice daily, tapering with improvement. 100 g 1    calcium carbonate (OS-CAIN) 600 mg calcium (1,500 mg) Tab Take 600 mg by mouth once daily.      coenzyme Q10 100 mg capsule as directed Orally      doxycycline (VIBRAMYCIN) 100 MG Cap Take one capsule by mouth twice daily. WEAR SUNSCREEN WHILE TAKING. 28 capsule 0    ezetimibe (ZETIA) 10 mg tablet TAKE 1 TABLET BY MOUTH ONCE DAILY IN THE EVENING 90 tablet 1    linaCLOtide (LINZESS) 145 mcg Cap capsule Take 1 capsule (145 mcg total) by mouth before breakfast. 30 capsule 3    mupirocin (BACTROBAN) 2 % ointment Apply to affected areas TID 30 g 2    oxyCODONE-acetaminophen (PERCOCET)  mg per tablet Take 1 tablet by mouth 3 (three) times daily as needed.      RINVOQ 15 mg 24 hr tablet Take 15 mg by mouth once  daily.      valACYclovir (VALTREX) 1000 MG tablet Take 2 tablets b.i.d. x1 day as needed for a cold sore 4 tablet 11    valACYclovir (VALTREX) 500 MG tablet Take 1 tablet (500 mg total) by mouth once daily. 30 tablet 11    varenicline (CHANTIX) 1 mg Tab Take 1 tablet (1 mg total) by mouth 2 (two) times daily. 56 tablet 1    zinc gluconate 50 mg tablet Take 50 mg by mouth once daily.      [DISCONTINUED] buPROPion (WELLBUTRIN) 100 MG tablet Take 1 tablet (100 mg total) by mouth 2 (two) times daily. 180 tablet 1    [DISCONTINUED] famotidine (PEPCID) 20 MG tablet Take 1 tablet (20 mg total) by mouth 2 (two) times daily. 180 tablet 1    [DISCONTINUED] tiZANidine (ZANAFLEX) 4 MG tablet Take 1 tablet by mouth twice daily 90 tablet 0     No current facility-administered medications on file prior to visit.     Immunization History   Administered Date(s) Administered    COVID-19 MRNA, LN-S PF (MODERNA HALF 0.25 ML DOSE) 04/26/2022    COVID-19, MRNA, LN-S, PF (MODERNA FULL 0.5 ML DOSE) 06/22/2021, 08/06/2021    COVID-19, mRNA, LNP-S, bivalent booster, PF (Moderna Omicron)12 + YEARS 11/16/2022       Review of Systems   Constitutional:  Negative for fever, malaise/fatigue and weight loss.   Respiratory:  Negative for shortness of breath.    Cardiovascular:  Negative for chest pain and palpitations.   Gastrointestinal:  Negative for nausea and vomiting.   Musculoskeletal:  Positive for joint pain and myalgias.   Skin:  Positive for rash.   Psychiatric/Behavioral:  Negative for depression.         Vitals:    01/02/24 0944   BP: 120/70   Pulse: (!) 59   Resp: 18   Temp: 98.2 °F (36.8 °C)       Physical Exam  Vitals reviewed.   Constitutional:       Appearance: Normal appearance.   HENT:      Head: Normocephalic.   Eyes:      Extraocular Movements: Extraocular movements intact.      Conjunctiva/sclera: Conjunctivae normal.      Pupils: Pupils are equal, round, and reactive to light.   Neck:      Thyroid: No thyroid mass or  thyromegaly.   Cardiovascular:      Rate and Rhythm: Normal rate and regular rhythm.      Heart sounds: Normal heart sounds. No murmur heard.     No gallop.   Pulmonary:      Effort: Pulmonary effort is normal. No respiratory distress.      Breath sounds: Normal breath sounds. No wheezing or rales.   Skin:     General: Skin is warm and dry.      Coloration: Skin is not jaundiced or pale.   Neurological:      Mental Status: She is alert.   Psychiatric:         Mood and Affect: Mood normal.         Behavior: Behavior normal.         Thought Content: Thought content normal.         Judgment: Judgment normal.          Assessment and Plan  1. Hypertension, unspecified type  -     CBC Auto Differential; Future; Expected date: 01/02/2024  -     Comprehensive Metabolic Panel; Future; Expected date: 01/02/2024    2. Spasm of muscle  -     tiZANidine (ZANAFLEX) 4 MG tablet; Take 1 tablet (4 mg total) by mouth 2 (two) times daily.  Dispense: 90 tablet; Refill: 0    3. Depression, unspecified depression type  -     buPROPion (WELLBUTRIN) 100 MG tablet; Take 1 tablet (100 mg total) by mouth 2 (two) times daily.  Dispense: 180 tablet; Refill: 1    4. Gastroesophageal reflux disease, unspecified whether esophagitis present  -     famotidine (PEPCID) 20 MG tablet; Take 1 tablet (20 mg total) by mouth 2 (two) times daily.  Dispense: 180 tablet; Refill: 1    5. Hyperlipidemia, unspecified hyperlipidemia type  -     Lipid Panel; Future; Expected date: 01/02/2024    6. Psoriatic arthritis  Assessment & Plan:  Continue current plan and continue to monitor for exacerbations.      7. Stage 3b chronic kidney disease  Assessment & Plan:  We will continue current plan and continue to monitor renal function.  Patient will be reminded to avoid nephrotoxic drugs               Return to clinic in 6 months or as needed once her lab work is in.    Health Maintenance Topics with due status: Not Due       Topic Last Completion Date    Colorectal  Cancer Screening 12/22/2020    Lipid Panel 06/30/2023    Cervical Cancer Screening 08/03/2023    Mammogram 08/28/2023

## 2024-01-10 ENCOUNTER — OFFICE VISIT (OUTPATIENT)
Dept: DERMATOLOGY | Facility: CLINIC | Age: 64
End: 2024-01-10
Payer: MEDICARE

## 2024-01-10 DIAGNOSIS — L23.89 ALLERGIC CONTACT DERMATITIS DUE TO OTHER AGENTS: Primary | ICD-10-CM

## 2024-01-10 DIAGNOSIS — L40.0 PLAQUE PSORIASIS: ICD-10-CM

## 2024-01-10 DIAGNOSIS — L20.84 INTRINSIC ATOPIC DERMATITIS: ICD-10-CM

## 2024-01-10 PROCEDURE — 1160F RVW MEDS BY RX/DR IN RCRD: CPT | Mod: CPTII,,, | Performed by: DERMATOLOGY

## 2024-01-10 PROCEDURE — 99214 OFFICE O/P EST MOD 30 MIN: CPT | Mod: ,,, | Performed by: DERMATOLOGY

## 2024-01-10 PROCEDURE — 1159F MED LIST DOCD IN RCRD: CPT | Mod: CPTII,,, | Performed by: DERMATOLOGY

## 2024-01-10 NOTE — PROGRESS NOTES
[facility]   Tatum Perry MD    Patient Name: Janene Castillo  Patient YOB: 1960   Date of Service: 1/10/24    CC: Follow-up Psoriasis and Allergic Contact Dermatitis    HPI: Janene Castillo is a 63 y.o. female here today for follow-up of ACD, psoriasis, psoriatic arthritis and atopic dermatitis last seen 2023.  Previous treatments include Dupixent, Cosentyx, Stelara, Skyrizi, and currently Rinvoq. Overall, the above problems are improved. Treatment plan was followed as directed.      Past Medical History:   Diagnosis Date    Allergic contact dermatitis     balsam of peru, bacitracin, fragrance, methyldibromo glutaronite    Anxiety     Chronic pain     HTN (hypertension)     Hyperlipidemia     Plaque psoriasis     Prurigo nodularis     Psoriatic arthritis     Stage 3b chronic kidney disease      Past Surgical History:   Procedure Laterality Date    ANGIOGRAM, CORONARY, WITH LEFT HEART CATHETERIZATION N/A 2022    Procedure: Angiogram, Coronary, with Left Heart Cath;  Surgeon: Jason Ratliff MD;  Location: [facility] CATH LAB;  Service: Cardiology;  Laterality: N/A;     SECTION       Review of patient's allergies indicates:   Allergen Reactions    Bacitracin Dermatitis    Codeine sulfate     Codeine Rash     Rash     Methyldibromoglutaronitrile Dermatitis    Permethrin Rash     Rash     Sulfa (sulfonamide antibiotics) Other (See Comments) and Rash     unknown       Current Outpatient Medications:     amLODIPine (NORVASC) 2.5 MG tablet, Take 2.5 mg by mouth once daily., Disp: , Rfl:     benazepriL (LOTENSIN) 20 MG tablet, Take 1 tablet (20 mg total) by mouth once daily., Disp: 90 tablet, Rfl: 1    buPROPion (WELLBUTRIN) 100 MG tablet, Take 1 tablet (100 mg total) by mouth 2 (two) times daily., Disp: 180 tablet, Rfl: 1    calcipotriene-betamethasone (TACLONEX) ointment, Apply topically to affected area twice daily, tapering with improvement., Disp: 100 g, Rfl: 1    calcium  carbonate (OS-CAIN) 600 mg calcium (1,500 mg) Tab, Take 600 mg by mouth once daily., Disp: , Rfl:     coenzyme Q10 100 mg capsule, as directed Orally, Disp: , Rfl:     doxycycline (VIBRAMYCIN) 100 MG Cap, Take one capsule by mouth twice daily. WEAR SUNSCREEN WHILE TAKING., Disp: 28 capsule, Rfl: 0    ezetimibe (ZETIA) 10 mg tablet, TAKE 1 TABLET BY MOUTH ONCE DAILY IN THE EVENING, Disp: 90 tablet, Rfl: 1    famotidine (PEPCID) 20 MG tablet, Take 1 tablet (20 mg total) by mouth 2 (two) times daily., Disp: 180 tablet, Rfl: 1    linaCLOtide (LINZESS) 145 mcg Cap capsule, Take 1 capsule (145 mcg total) by mouth before breakfast., Disp: 30 capsule, Rfl: 3    mupirocin (BACTROBAN) 2 % ointment, Apply to affected areas TID, Disp: 30 g, Rfl: 2    oxyCODONE-acetaminophen (PERCOCET)  mg per tablet, Take 1 tablet by mouth 3 (three) times daily as needed., Disp: , Rfl:     RINVOQ 15 mg 24 hr tablet, Take 15 mg by mouth once daily., Disp: , Rfl:     tiZANidine (ZANAFLEX) 4 MG tablet, Take 1 tablet (4 mg total) by mouth 2 (two) times daily., Disp: 90 tablet, Rfl: 0    valACYclovir (VALTREX) 1000 MG tablet, Take 2 tablets b.i.d. x1 day as needed for a cold sore, Disp: 4 tablet, Rfl: 11    valACYclovir (VALTREX) 500 MG tablet, Take 1 tablet (500 mg total) by mouth once daily., Disp: 30 tablet, Rfl: 11    varenicline (CHANTIX) 1 mg Tab, Take 1 tablet (1 mg total) by mouth 2 (two) times daily., Disp: 56 tablet, Rfl: 1    zinc gluconate 50 mg tablet, Take 50 mg by mouth once daily., Disp: , Rfl:     ROS: A focused review of systems was obtained and negative.     Exam: A focused skin exam was performed. All areas examined were normal except as mentioned in the assessment and plan below.  General Appearance of the patient is well developed and well nourished.  Orientation: alert and oriented x 3.  Mood and affect: pleasant.    Assessment:   The primary encounter diagnosis was Allergic contact dermatitis due to other agents.  Diagnoses of Plaque psoriasis and Intrinsic atopic dermatitis were also pertinent to this visit.    Plan:        Allergic Contact Dermatitis (L23.89)  - clear  Status: stable     Plan: Counseling.  I counseled the patient regarding the following:  Skin care: Patient should use hypoallergenic products such as unscented soaps. Eliminate exposure to all new  cosmetics, fragrances, hair products, nail products shampoos, scented soaps, plants, metals and sunscreens.  Expectations: Allergic contact dermatitis can persist for several weeks before it fully resolves. Sometimes, patch  testing is necessary if reactions persist or if the patient is in contact with several potential allergens.  Contact office if: Allergic contact dermatitis worsens or fails to improve despite several weeks of treatment.     - continue to follow [place] list    Atopic Dermatitis (L20.89)  - clear    Status: stable    Plan: Counseling.  I counseled the patient regarding the following:  Skin care: Patient should bathe using lukewarm water with a mild cleanser and moisturize immediately after.  Emollients should be applied at least 2-3 times daily. Avoid scented detergents or fabric softeners. Keep  fingernails short. Avoid excessive hand washing.  Expectations: The patient is aware that eczema is chronic in nature and can improve with moisturizers and topical  steroids and worsen with stress, scented soaps, detergents, scratching, dry skin, changes in weather and skin  infections.  Contact office if: Eczema worsens or fails to improve despite several weeks of treatment; patient develops skin  infections (such as: yellow honey colored crusts or cold sores).    - continue Rinvoq with rheumatology        Psoriatic Arthritis  - clear   Status: stable     Plan: Counseling.  I counseled the patient regarding the following:  Instructions: Systemic medications are the treatment of choice for psoriatic arthritis. Treatment options include  anti-TNF therapy  and methotrexate.  Expectations: Psoriatic arthritis is a type of inflammatory arthritis which occurs in conjunction with psoriasis.  Approximately 5% of psoriasis patients develop psoriatic arthritis. Psoriatic arthritis is chronic in nature with  periods of remissions and flares. Flares can be triggered by stress, infections (group A strep), certain medications  and alcohol. Psoriatic arthritis requires systemic medication for adequate treatment.  Contact office if: Your psoriatic arthritis worsens, or fails to improve despite several months of treatment.    continue Rinvoq with rheumatology      Plaque Psoriasis  - clear  Status: well controlled     Plan: Counseling  I counseled the patient regarding the following:  Skin care: Emollients, ambient sun exposure, shampoos with tar, selenium or zinc pyrithione can improve psoriasis.  Expectations: Psoriasis is chronic in nature with periods of remissions and flares. Flares can be triggered by stress, infections (group A strep), certain medications and alcohol.  Contact office if: Psoriasis worsens, or fails to improve despite several months of treatment.        High Risk Medication Monitoring (Z79.899) : The risks and benefits of the medication were reviewed in full with the patient. Should any side effects occur, the patient will stop the medication and contact me immediately.    Rinvoq counseling: I discussed with patient potential side effects including lowered immune system, infections, cardiovascular risk including heart attack and stroke, allergic reactions, blot clots, cancer, and tears in the stomach or intestines.  I discussed lab monitoring with patient.  Patient will call with any side effects including symptoms of infection.        Follow up in about 6 months (around 7/10/2024) for Psoriasis.    Tatum Perry MD

## 2024-01-15 DIAGNOSIS — F17.200 SMOKER: ICD-10-CM

## 2024-01-15 RX ORDER — VARENICLINE TARTRATE 1 MG/1
1 TABLET, FILM COATED ORAL 2 TIMES DAILY
Qty: 56 TABLET | Refills: 0 | Status: SHIPPED | OUTPATIENT
Start: 2024-01-15

## 2024-01-31 ENCOUNTER — EXTERNAL CHRONIC CARE MANAGEMENT (OUTPATIENT)
Dept: FAMILY MEDICINE | Facility: CLINIC | Age: 64
End: 2024-01-31
Payer: MEDICARE

## 2024-01-31 PROCEDURE — G0511 CCM/BHI BY RHC/FQHC 20MIN MO: HCPCS | Mod: ,,, | Performed by: FAMILY MEDICINE

## 2024-02-02 ENCOUNTER — OFFICE VISIT (OUTPATIENT)
Dept: FAMILY MEDICINE | Facility: CLINIC | Age: 64
End: 2024-02-02
Payer: MEDICARE

## 2024-02-02 VITALS
HEIGHT: 67 IN | BODY MASS INDEX: 30.13 KG/M2 | HEART RATE: 72 BPM | OXYGEN SATURATION: 97 % | SYSTOLIC BLOOD PRESSURE: 110 MMHG | WEIGHT: 192 LBS | TEMPERATURE: 99 F | DIASTOLIC BLOOD PRESSURE: 70 MMHG

## 2024-02-02 DIAGNOSIS — R52 BODY ACHES: ICD-10-CM

## 2024-02-02 DIAGNOSIS — J02.9 SORE THROAT: ICD-10-CM

## 2024-02-02 DIAGNOSIS — J00 NASOPHARYNGITIS ACUTE: Primary | ICD-10-CM

## 2024-02-02 LAB
CTP QC/QA: YES
CTP QC/QA: YES
FLUAV AG NPH QL: NEGATIVE
FLUBV AG NPH QL: NEGATIVE
SARS-COV-2 RDRP RESP QL NAA+PROBE: NEGATIVE

## 2024-02-02 PROCEDURE — 87635 SARS-COV-2 COVID-19 AMP PRB: CPT | Mod: QW,,, | Performed by: NURSE PRACTITIONER

## 2024-02-02 PROCEDURE — 3078F DIAST BP <80 MM HG: CPT | Mod: ,,, | Performed by: NURSE PRACTITIONER

## 2024-02-02 PROCEDURE — 3074F SYST BP LT 130 MM HG: CPT | Mod: ,,, | Performed by: NURSE PRACTITIONER

## 2024-02-02 PROCEDURE — 99213 OFFICE O/P EST LOW 20 MIN: CPT | Mod: ,,, | Performed by: NURSE PRACTITIONER

## 2024-02-02 PROCEDURE — 1159F MED LIST DOCD IN RCRD: CPT | Mod: ,,, | Performed by: NURSE PRACTITIONER

## 2024-02-02 PROCEDURE — 87804 INFLUENZA ASSAY W/OPTIC: CPT | Mod: QW,,, | Performed by: NURSE PRACTITIONER

## 2024-02-02 PROCEDURE — 3008F BODY MASS INDEX DOCD: CPT | Mod: ,,, | Performed by: NURSE PRACTITIONER

## 2024-02-02 PROCEDURE — 1160F RVW MEDS BY RX/DR IN RCRD: CPT | Mod: ,,, | Performed by: NURSE PRACTITIONER

## 2024-02-02 RX ORDER — CETIRIZINE HYDROCHLORIDE, PSEUDOEPHEDRINE HYDROCHLORIDE 5; 120 MG/1; MG/1
1 TABLET, FILM COATED, EXTENDED RELEASE ORAL 2 TIMES DAILY
Qty: 20 TABLET | Refills: 0 | Status: SHIPPED | OUTPATIENT
Start: 2024-02-02 | End: 2024-02-12

## 2024-02-02 NOTE — PROGRESS NOTES
[facility] Medicine    Chief Complaint      Chief Complaint   Patient presents with    Sore Throat     Pt is here with sore throat, body aches, headache that started last night. She has taken theraflu last night. No fever noted.        History of Present Illness      Janene Castillo is a 63 y.o. female. She  has a past medical history of Allergic contact dermatitis, Anxiety, Chronic pain, HTN (hypertension), Hyperlipidemia, Plaque psoriasis, Prurigo nodularis, Psoriatic arthritis, and Stage 3b chronic kidney disease., who presents today for URI type symptoms-ST, body aches, HA since last night.    Past Medical History:  Past Medical History:   Diagnosis Date    Allergic contact dermatitis     balsam of peru, bacitracin, fragrance, methyldibromo glutaronite    Anxiety     Chronic pain     HTN (hypertension)     Hyperlipidemia     Plaque psoriasis     Prurigo nodularis     Psoriatic arthritis     Stage 3b chronic kidney disease        Past Surgical History:   has a past surgical history that includes  section and ANGIOGRAM, CORONARY, WITH LEFT HEART CATHETERIZATION (N/A, 2022).    Social History:  Social History     Tobacco Use    Smoking status: Former     Current packs/day: 1.00     Average packs/day: 1 pack/day for 44.0 years (44.0 ttl pk-yrs)     Types: Cigarettes     Passive exposure: Past    Smokeless tobacco: Never   Substance Use Topics    Alcohol use: Not Currently    Drug use: Never       I personally reviewed all past medical, surgical, and social.     Review of Systems   Constitutional:  Negative for chills, fatigue and fever.   HENT:  Positive for sore throat. Negative for congestion.    Respiratory:  Negative for cough and shortness of breath.    Musculoskeletal:  Positive for myalgias.   Neurological:  Positive for headaches.        Medications:  Outpatient Encounter Medications as of 2024   Medication Sig Dispense Refill    amLODIPine (NORVASC) 2.5 MG tablet Take 2.5 mg by mouth once  daily.      benazepriL (LOTENSIN) 20 MG tablet Take 1 tablet (20 mg total) by mouth once daily. 90 tablet 1    buPROPion (WELLBUTRIN) 100 MG tablet Take 1 tablet (100 mg total) by mouth 2 (two) times daily. 180 tablet 1    calcipotriene-betamethasone (TACLONEX) ointment Apply topically to affected area twice daily, tapering with improvement. 100 g 1    calcium carbonate (OS-CAIN) 600 mg calcium (1,500 mg) Tab Take 600 mg by mouth once daily.      coenzyme Q10 100 mg capsule as directed Orally      ezetimibe (ZETIA) 10 mg tablet TAKE 1 TABLET BY MOUTH ONCE DAILY IN THE EVENING 90 tablet 1    famotidine (PEPCID) 20 MG tablet Take 1 tablet (20 mg total) by mouth 2 (two) times daily. 180 tablet 1    linaCLOtide (LINZESS) 145 mcg Cap capsule Take 1 capsule (145 mcg total) by mouth before breakfast. 30 capsule 3    mupirocin (BACTROBAN) 2 % ointment Apply to affected areas TID 30 g 2    oxyCODONE-acetaminophen (PERCOCET)  mg per tablet Take 1 tablet by mouth 3 (three) times daily as needed.      RINVOQ 15 mg 24 hr tablet Take 15 mg by mouth once daily.      tiZANidine (ZANAFLEX) 4 MG tablet Take 1 tablet (4 mg total) by mouth 2 (two) times daily. 90 tablet 0    valACYclovir (VALTREX) 500 MG tablet Take 1 tablet (500 mg total) by mouth once daily. 30 tablet 11    varenicline (CHANTIX) 1 mg Tab Take 1 tablet by mouth twice daily 56 tablet 0    zinc gluconate 50 mg tablet Take 50 mg by mouth once daily.      cetirizine-pseudoephedrine 5-120 mg Tb12 Take 1 tablet by mouth 2 (two) times a day. for 10 days 20 tablet 0    doxycycline (VIBRAMYCIN) 100 MG Cap Take one capsule by mouth twice daily. WEAR SUNSCREEN WHILE TAKING. 28 capsule 0    valACYclovir (VALTREX) 1000 MG tablet Take 2 tablets b.i.d. x1 day as needed for a cold sore (Patient not taking: Reported on 2/2/2024) 4 tablet 11     No facility-administered encounter medications on file as of 2/2/2024.       Allergies:  Review of patient's allergies indicates:  "  Allergen Reactions    Bacitracin Dermatitis    Codeine sulfate     Codeine Rash     Rash     Methyldibromoglutaronitrile Dermatitis    Permethrin Rash     Rash     Sulfa (sulfonamide antibiotics) Other (See Comments) and Rash     unknown       Health Maintenance:  Immunization History   Administered Date(s) Administered    COVID-19 MRNA, LN-S PF (MODERNA HALF 0.25 ML DOSE) 04/26/2022    COVID-19, MRNA, LN-S, PF (MODERNA FULL 0.5 ML DOSE) 06/22/2021, 08/06/2021    COVID-19, mRNA, LNP-S, bivalent booster, PF (Moderna Omicron)12 + YEARS 11/16/2022      Health Maintenance   Topic Date Due    Hepatitis C Screening  Never done    TETANUS VACCINE  Never done    Aspirin/Antiplatelet Therapy  Never done    Shingles Vaccine (1 of 2) Never done    Mammogram  08/28/2024    Lipid Panel  01/05/2029    Colorectal Cancer Screening  12/22/2030        Physical Exam      Vital Signs  Temp: 99.2 °F (37.3 °C)  Temp Source: Oral  Pulse: 72  SpO2: 97 %  BP: 110/70  BP Location: Left arm  Patient Position: Sitting  Pain Score:   7  Pain Loc: Hip  Height and Weight  Height: 5' 7" (170.2 cm)  Weight: 87.1 kg (192 lb)  BSA (Calculated - sq m): 2.03 sq meters  BMI (Calculated): 30.1  Weight in (lb) to have BMI = 25: 159.3]    Physical Exam  Vitals and nursing note reviewed.   Constitutional:       Appearance: Normal appearance. She is well-developed.   HENT:      Head: Normocephalic.      Right Ear: Hearing, tympanic membrane, ear canal and external ear normal.      Left Ear: Hearing, tympanic membrane, ear canal and external ear normal.      Nose: Congestion and rhinorrhea present. Rhinorrhea is clear.      Mouth/Throat:      Lips: Pink.      Pharynx: Oropharynx is clear.   Eyes:      General: Lids are normal.      Conjunctiva/sclera: Conjunctivae normal.   Cardiovascular:      Rate and Rhythm: Normal rate and regular rhythm.      Heart sounds: Normal heart sounds.   Pulmonary:      Effort: Pulmonary effort is normal.      Breath sounds: " Normal breath sounds.   Musculoskeletal:         General: Normal range of motion.      Cervical back: Normal range of motion and neck supple.   Skin:     General: Skin is warm and dry.   Neurological:      Mental Status: She is alert and oriented to person, place, and time.      Gait: Gait is intact.   Psychiatric:         Behavior: Behavior is cooperative.          Laboratory:  CBC:  Recent Labs   Lab 06/23/23  0945 06/30/23  1004 01/05/24  1019   WBC 3.47 L 4.68 3.47 L   RBC 3.96 L 3.97 L 3.71 L   Hemoglobin 11.6 L 11.6 L 10.9 L   Hematocrit 37.6 L 37.7 L 34.2 L   Platelet Count 284 290 281   MCV 94.9 95.0 92.2   MCH 29.3 29.2 29.4   MCHC 30.9 L 30.8 L 31.9 L     CMP:  Recent Labs   Lab 06/23/23  0945 06/30/23  1004 01/05/24  1019   Glucose 99 91 93   Calcium 9.4 9.4 9.3   Albumin 4.0 4.1 3.8   Total Protein 7.1 7.3 7.0   Sodium 141 142 140   Potassium 5.3 H 4.7 5.5 H   CO2 27 28 28   Chloride 109 H 107 110 H   BUN 25 H 16 21 H   Alk Phos 133 H 126 116   ALT 21 24 28   AST 16 22 16   Bilirubin, Total 0.3 0.4 0.4     LIPIDS:  Recent Labs   Lab 06/07/21  0722 10/25/21  0821 06/23/23  0945 06/30/23  1004 01/05/24  1019   TSH 2.010  --   --   --   --    HDL Cholesterol 46   < > 64 H 64 H 65 H   Cholesterol 175   < > 199 193 181   Triglycerides 145   < > 95 108 85   LDL Calculated 100   < > 116 107 99   Cholesterol/HDL Ratio (Risk Factor) 3.8   < > 3.1 3.0 2.8   Non-   < > 135 129 116    < > = values in this interval not displayed.     TSH:  Recent Labs   Lab 06/07/21 0722   TSH 2.010     A1C:        Assessment/Plan     Janene Castillo is a 63 y.o.female with:     1. Nasopharyngitis acute  -     cetirizine-pseudoephedrine 5-120 mg Tb12; Take 1 tablet by mouth 2 (two) times a day. for 10 days  Dispense: 20 tablet; Refill: 0    2. Sore throat  -     POCT COVID-19 Rapid Screening  -     POCT Influenza A/B    3. Body aches  -     POCT COVID-19 Rapid Screening  -     POCT Influenza A/B       Covid/Flu-  negative    Total time spent face-to-face and non-face-to-face coordinating care for this encounter was: 20 minutes     Chronic conditions status updated as per HPI.  Other than changes above, cont current medications and maintain follow up with specialists.  Return to clinic prn if symptoms worsen or fail to improve.    Mariah Ellis, AKSHATP  [facility]

## 2024-02-02 NOTE — LETTER
February 2, 2024    Janene Castillo  [address]  [place] [address]             [facility]  Family Medicine  [address] [place] [address]  Phone: [phone]  Fax: [phone]   February 2, 2024     Patient: Janene Castillo   YOB: 1960   Date of Visit: 2/2/2024       To Whom it May Concern:    Janene Castillo was seen in my clinic on 2/2/2024. She may return to work on 2/3/24 .    Please excuse her from any classes or work missed.    If you have any questions or concerns, please don't hesitate to call.    Sincerely,         Mariah Ellis, AKSHATP

## 2024-02-13 DIAGNOSIS — K59.04 CHRONIC IDIOPATHIC CONSTIPATION: ICD-10-CM

## 2024-02-13 DIAGNOSIS — M62.838 SPASM OF MUSCLE: ICD-10-CM

## 2024-02-13 DIAGNOSIS — I10 HYPERTENSION, UNSPECIFIED TYPE: ICD-10-CM

## 2024-02-13 RX ORDER — LINACLOTIDE 145 UG/1
145 CAPSULE, GELATIN COATED ORAL
Qty: 30 CAPSULE | Refills: 0 | Status: SHIPPED | OUTPATIENT
Start: 2024-02-13 | End: 2024-03-24

## 2024-02-13 RX ORDER — TIZANIDINE 4 MG/1
4 TABLET ORAL 2 TIMES DAILY
Qty: 90 TABLET | Refills: 0 | Status: SHIPPED | OUTPATIENT
Start: 2024-02-13 | End: 2024-05-13 | Stop reason: SDUPTHER

## 2024-02-13 RX ORDER — BENAZEPRIL HYDROCHLORIDE 20 MG/1
20 TABLET ORAL
Qty: 90 TABLET | Refills: 0 | Status: SHIPPED | OUTPATIENT
Start: 2024-02-13 | End: 2024-05-25 | Stop reason: SDUPTHER

## 2024-02-15 ENCOUNTER — TELEPHONE (OUTPATIENT)
Dept: FAMILY MEDICINE | Facility: CLINIC | Age: 64
End: 2024-02-15
Payer: MEDICARE

## 2024-02-15 NOTE — TELEPHONE ENCOUNTER
----- Message from Mildred Crump LPN sent at 1/8/2024  4:24 PM CST -----    ----- Message -----  From: Asael Huizar MD  Sent: 1/8/2024   8:02 AM CST  To: Bhupendra Vyas Staff    Ov for  LDL, Dry, increase h20 intake.

## 2024-02-23 ENCOUNTER — OFFICE VISIT (OUTPATIENT)
Dept: FAMILY MEDICINE | Facility: CLINIC | Age: 64
End: 2024-02-23
Payer: MEDICARE

## 2024-02-23 VITALS
OXYGEN SATURATION: 96 % | WEIGHT: 192 LBS | HEART RATE: 65 BPM | TEMPERATURE: 98 F | BODY MASS INDEX: 30.13 KG/M2 | DIASTOLIC BLOOD PRESSURE: 62 MMHG | SYSTOLIC BLOOD PRESSURE: 98 MMHG | RESPIRATION RATE: 18 BRPM | HEIGHT: 67 IN

## 2024-02-23 DIAGNOSIS — E78.5 HYPERLIPIDEMIA, UNSPECIFIED HYPERLIPIDEMIA TYPE: Primary | ICD-10-CM

## 2024-02-23 DIAGNOSIS — N18.32 STAGE 3B CHRONIC KIDNEY DISEASE: ICD-10-CM

## 2024-02-23 DIAGNOSIS — Z78.9 STATIN INTOLERANCE: ICD-10-CM

## 2024-02-23 PROBLEM — L03.90 CELLULITIS: Status: RESOLVED | Noted: 2022-10-02 | Resolved: 2024-02-23

## 2024-02-23 PROCEDURE — 3074F SYST BP LT 130 MM HG: CPT | Mod: ,,, | Performed by: NURSE PRACTITIONER

## 2024-02-23 PROCEDURE — 3008F BODY MASS INDEX DOCD: CPT | Mod: ,,, | Performed by: NURSE PRACTITIONER

## 2024-02-23 PROCEDURE — 3078F DIAST BP <80 MM HG: CPT | Mod: ,,, | Performed by: NURSE PRACTITIONER

## 2024-02-23 PROCEDURE — 99212 OFFICE O/P EST SF 10 MIN: CPT | Mod: ,,, | Performed by: NURSE PRACTITIONER

## 2024-02-23 PROCEDURE — 1160F RVW MEDS BY RX/DR IN RCRD: CPT | Mod: ,,, | Performed by: NURSE PRACTITIONER

## 2024-02-23 PROCEDURE — 4010F ACE/ARB THERAPY RXD/TAKEN: CPT | Mod: ,,, | Performed by: NURSE PRACTITIONER

## 2024-02-23 PROCEDURE — 1159F MED LIST DOCD IN RCRD: CPT | Mod: ,,, | Performed by: NURSE PRACTITIONER

## 2024-02-23 NOTE — PROGRESS NOTES
JANET Alfonso        PATIENT NAME: Janene Castillo  : 1960  DATE: 24  MRN: [id number]      Billing Provider: JANET Alfonso  Level of Service:   Patient PCP Information       Provider PCP Type    Asael Huizar MD General            Reason for Visit / Chief Complaint: Results (Lab review- LDL)         History of Present Illness / Problem Focused Workflow     Janene Castillo presents to the clinic with Results (Lab review- LDL)     Ms. Barakat presents to clinic and lab follow-up.  Reviewed lipids, CBC, chemistry with patient, labs appear that she may have been slightly dehydrated.  LDL has improved, she notes she is increased her exercise and is improved her diet.        Review of Systems     Review of Systems   Constitutional: Negative.    Respiratory: Negative.     Cardiovascular: Negative.    Gastrointestinal: Negative.    Genitourinary: Negative.    Neurological: Negative.    Psychiatric/Behavioral: Negative.          Medical / Social / Family History     Past Medical History:   Diagnosis Date    Allergic contact dermatitis     balsam of peru, bacitracin, fragrance, methyldibromo glutaronite    Anxiety     Chronic pain     HTN (hypertension)     Hyperlipidemia     Plaque psoriasis     Prurigo nodularis     Psoriatic arthritis     Stage 3b chronic kidney disease        Past Surgical History:   Procedure Laterality Date    ANGIOGRAM, CORONARY, WITH LEFT HEART CATHETERIZATION N/A 2022    Procedure: Angiogram, Coronary, with Left Heart Cath;  Surgeon: Jason Ratliff MD;  Location: [facility] CATH LAB;  Service: Cardiology;  Laterality: N/A;     SECTION         Social History  Ms. Janene Castillo   reports that she has quit smoking. Her smoking use included cigarettes. She has a 44.0 pack-year smoking history. She has been exposed to tobacco smoke. She has never used smokeless tobacco. She reports that she does not currently use alcohol. She reports that she does not use  drugs.    Family History  Ms.'s Janene Castillo  family history includes Heart attack in her father; Heart disease in her father; Heart failure in her father; Hypertension in her mother; Pacemaker/defibrilator in her father.    Medications and Allergies     Medications  Outpatient Medications Marked as Taking for the 2/23/24 encounter (Office Visit) with Cynthia Lombardi FNP   Medication Sig Dispense Refill    amLODIPine (NORVASC) 2.5 MG tablet Take 2.5 mg by mouth once daily.      benazepriL (LOTENSIN) 20 MG tablet Take 1 tablet by mouth once daily 90 tablet 0    buPROPion (WELLBUTRIN) 100 MG tablet Take 1 tablet (100 mg total) by mouth 2 (two) times daily. 180 tablet 1    calcipotriene-betamethasone (TACLONEX) ointment Apply topically to affected area twice daily, tapering with improvement. 100 g 1    calcium carbonate (OS-CAIN) 600 mg calcium (1,500 mg) Tab Take 600 mg by mouth once daily.      coenzyme Q10 100 mg capsule as directed Orally      ezetimibe (ZETIA) 10 mg tablet TAKE 1 TABLET BY MOUTH ONCE DAILY IN THE EVENING 90 tablet 1    famotidine (PEPCID) 20 MG tablet Take 1 tablet (20 mg total) by mouth 2 (two) times daily. 180 tablet 1    LINZESS 145 mcg Cap capsule TAKE 1 CAPSULE BY MOUTH ONCE DAILY BEFORE  BREAKFAST 30 capsule 0    mupirocin (BACTROBAN) 2 % ointment Apply to affected areas TID 30 g 2    oxyCODONE-acetaminophen (PERCOCET)  mg per tablet Take 1 tablet by mouth 3 (three) times daily as needed.      RINVOQ 15 mg 24 hr tablet Take 15 mg by mouth once daily.      tiZANidine (ZANAFLEX) 4 MG tablet Take 1 tablet by mouth twice daily 90 tablet 0    valACYclovir (VALTREX) 500 MG tablet Take 1 tablet (500 mg total) by mouth once daily. 30 tablet 11    varenicline (CHANTIX) 1 mg Tab Take 1 tablet by mouth twice daily 56 tablet 0    zinc gluconate 50 mg tablet Take 50 mg by mouth once daily.         Allergies  Review of patient's allergies indicates:   Allergen Reactions    Bacitracin Dermatitis     Codeine sulfate     Codeine Rash     Rash     Methyldibromoglutaronitrile Dermatitis    Permethrin Rash     Rash     Sulfa (sulfonamide antibiotics) Other (See Comments) and Rash     unknown         Vitals:    02/23/24 1017   BP: 98/62   Pulse: 65   Resp: 18   Temp: 98.3 °F (36.8 °C)     Physical Exam  Constitutional:       General: She is not in acute distress.     Appearance: Normal appearance.   HENT:      Head: Normocephalic and atraumatic.   Eyes:      Pupils: Pupils are equal, round, and reactive to light.   Cardiovascular:      Rate and Rhythm: Normal rate and regular rhythm.      Pulses: Normal pulses.      Heart sounds: Normal heart sounds.   Pulmonary:      Effort: Pulmonary effort is normal. No respiratory distress.      Breath sounds: Normal breath sounds.   Chest:      Chest wall: No tenderness.   Abdominal:      Palpations: Abdomen is soft.      Tenderness: There is no abdominal tenderness.   Musculoskeletal:         General: Normal range of motion.      Cervical back: Normal range of motion and neck supple.   Skin:     General: Skin is warm and dry.   Neurological:      Mental Status: She is alert and oriented to person, place, and time.   Psychiatric:         Mood and Affect: Mood normal.         Behavior: Behavior normal.            Lab Results   Component Value Date    WBC 3.47 (L) 01/05/2024    HGB 10.9 (L) 01/05/2024    HCT 34.2 (L) 01/05/2024    MCV 92.2 01/05/2024     01/05/2024          Sodium   Date Value Ref Range Status   01/05/2024 140 136 - 145 mmol/L Final     Potassium   Date Value Ref Range Status   01/05/2024 5.5 (H) 3.5 - 5.1 mmol/L Final     Chloride   Date Value Ref Range Status   01/05/2024 110 (H) 98 - 107 mmol/L Final     CO2   Date Value Ref Range Status   01/05/2024 28 21 - 32 mmol/L Final     Glucose   Date Value Ref Range Status   01/05/2024 93 74 - 106 mg/dL Final     BUN   Date Value Ref Range Status   01/05/2024 21 (H) 7 - 18 mg/dL Final     Creatinine   Date  "Value Ref Range Status   01/05/2024 1.22 (H) 0.55 - 1.02 mg/dL Final     Calcium   Date Value Ref Range Status   01/05/2024 9.3 8.5 - 10.1 mg/dL Final     Total Protein   Date Value Ref Range Status   01/05/2024 7.0 6.4 - 8.2 g/dL Final     Albumin   Date Value Ref Range Status   01/05/2024 3.8 3.5 - 5.0 g/dL Final     Bilirubin, Total   Date Value Ref Range Status   01/05/2024 0.4 >0.0 - 1.2 mg/dL Final     Alk Phos   Date Value Ref Range Status   01/05/2024 116 50 - 130 U/L Final     AST   Date Value Ref Range Status   01/05/2024 16 15 - 37 U/L Final     ALT   Date Value Ref Range Status   01/05/2024 28 13 - 56 U/L Final     Anion Gap   Date Value Ref Range Status   01/05/2024 8 7 - 16 mmol/L Final     eGFR   Date Value Ref Range Status   01/05/2024 51 (L) >=60 mL/min/1.73m2 Final        Lab Results   Component Value Date    CHOL 181 01/05/2024    CHOL 193 06/30/2023    CHOL 199 06/23/2023     Lab Results   Component Value Date    HDL 65 (H) 01/05/2024    HDL 64 (H) 06/30/2023    HDL 64 (H) 06/23/2023     Lab Results   Component Value Date    LDLCALC 99 01/05/2024    LDLCALC 107 06/30/2023    LDLCALC 116 06/23/2023     Lab Results   Component Value Date    TRIG 85 01/05/2024    TRIG 108 06/30/2023    TRIG 95 06/23/2023     Lab Results   Component Value Date    CHOLHDL 2.8 01/05/2024    CHOLHDL 3.0 06/30/2023    CHOLHDL 3.1 06/23/2023        No results found for: "LABA1C", "HGBA1C"     Lab Results   Component Value Date    TSH 2.010 06/07/2021        No results found for: "PSA", "PSATOTAL", "PSAFREE", "PSAFREEPCT"       Health Maintenance Due   Topic Date Due    Hepatitis C Screening  Never done    HIV Screening  Never done    TETANUS VACCINE  Never done    Aspirin/Antiplatelet Therapy  Never done    Hemoglobin A1c (Diabetic Prevention Screening)  Never done    Shingles Vaccine (1 of 2) Never done    RSV Vaccine (Age 60+ and Pregnant patients) (1 - 1-dose 60+ series) Never done    Influenza Vaccine (1) Never done "    COVID-19 Vaccine (5 - 2023-24 season) 09/01/2023       Problem List Items Addressed This Visit          Cardiac/Vascular    Hyperlipidemia - Primary       Renal/    Stage 3b chronic kidney disease     Labs reviewed with patient, in the past she has been statin intolerant and prefers to treat with diet and exercise.  LDL has improved, patient encouraged to continue these measures.      Health Maintenance Topics with due status: Not Due       Topic Last Completion Date    Colorectal Cancer Screening 12/22/2020    Cervical Cancer Screening 08/03/2023    Mammogram 08/28/2023    Lipid Panel 01/05/2024       Future Appointments   Date Time Provider Department Center   7/2/2024  8:30 AM Asael Huizar MD [facility]   7/24/2024  8:30 AM Tatum Perry MD [facility]   9/3/2024  8:30 AM [facility]        There are no Patient Instructions on file for this visit.  No follow-ups on file.       Date of encounter: 2/23/24

## 2024-02-24 DIAGNOSIS — E78.5 HYPERLIPIDEMIA, UNSPECIFIED HYPERLIPIDEMIA TYPE: ICD-10-CM

## 2024-02-24 DIAGNOSIS — I10 HYPERTENSION, UNSPECIFIED TYPE: Primary | ICD-10-CM

## 2024-02-24 RX ORDER — AMLODIPINE BESYLATE 2.5 MG/1
2.5 TABLET ORAL DAILY
Qty: 90 TABLET | Refills: 0 | Status: SHIPPED | OUTPATIENT
Start: 2024-02-24

## 2024-02-24 RX ORDER — EZETIMIBE 10 MG/1
10 TABLET ORAL NIGHTLY
Qty: 90 TABLET | Refills: 0 | Status: SHIPPED | OUTPATIENT
Start: 2024-02-24 | End: 2024-05-13 | Stop reason: SDUPTHER

## 2024-02-29 ENCOUNTER — EXTERNAL CHRONIC CARE MANAGEMENT (OUTPATIENT)
Dept: FAMILY MEDICINE | Facility: CLINIC | Age: 64
End: 2024-02-29
Payer: MEDICARE

## 2024-02-29 PROCEDURE — G0511 CCM/BHI BY RHC/FQHC 20MIN MO: HCPCS | Mod: ,,, | Performed by: FAMILY MEDICINE

## 2024-03-06 DIAGNOSIS — F17.200 SMOKER: ICD-10-CM

## 2024-03-06 RX ORDER — VARENICLINE TARTRATE 1 MG/1
1 TABLET, FILM COATED ORAL 2 TIMES DAILY
Qty: 56 TABLET | Refills: 0 | Status: SHIPPED | OUTPATIENT
Start: 2024-03-06 | End: 2024-04-01

## 2024-03-07 ENCOUNTER — TELEPHONE (OUTPATIENT)
Dept: DERMATOLOGY | Facility: CLINIC | Age: 64
End: 2024-03-07
Payer: MEDICARE

## 2024-03-07 NOTE — TELEPHONE ENCOUNTER
Call back to pt. No answer. Left voicemail for callback.      ----- Message from Martine Pepper sent at 3/7/2024 11:52 AM CST -----  Regarding: Medicine  Want to know if she needs to seen to get another prescription of doxycycline

## 2024-03-08 ENCOUNTER — CLINICAL SUPPORT (OUTPATIENT)
Dept: DERMATOLOGY | Facility: CLINIC | Age: 64
End: 2024-03-08
Payer: MEDICARE

## 2024-03-08 DIAGNOSIS — L08.9 SKIN INFECTION: ICD-10-CM

## 2024-03-08 DIAGNOSIS — L08.9 SKIN INFECTION: Primary | ICD-10-CM

## 2024-03-08 PROCEDURE — 87070 CULTURE OTHR SPECIMN AEROBIC: CPT | Mod: ,,, | Performed by: CLINICAL MEDICAL LABORATORY

## 2024-03-08 RX ORDER — DOXYCYCLINE 100 MG/1
CAPSULE ORAL
Qty: 28 CAPSULE | Refills: 0 | Status: SHIPPED | OUTPATIENT
Start: 2024-03-08

## 2024-03-08 NOTE — PROGRESS NOTES
[facility]   Tatum Perry MD    Patient Name: Janene Castillo  Patient YOB: 1960   Date of Service: 3/8/24    CC: Wound Check    HPI: Janene Castillo is a 63 y.o. female here today for wound culture of lesions, located on the face and bilateral arms.  Lesions have been present for 1 weeks.  Previous treatments include mupirocin.      Past Medical History:   Diagnosis Date    Allergic contact dermatitis     balsam of peru, bacitracin, fragrance, methyldibromo glutaronite    Anxiety     Chronic pain     HTN (hypertension)     Hyperlipidemia     Plaque psoriasis     Prurigo nodularis     Psoriatic arthritis     Stage 3b chronic kidney disease      Past Surgical History:   Procedure Laterality Date    ANGIOGRAM, CORONARY, WITH LEFT HEART CATHETERIZATION N/A 2022    Procedure: Angiogram, Coronary, with Left Heart Cath;  Surgeon: Jason Ratliff MD;  Location: [facility] CATH LAB;  Service: Cardiology;  Laterality: N/A;     SECTION       Review of patient's allergies indicates:   Allergen Reactions    Bacitracin Dermatitis    Codeine sulfate     Codeine Rash     Rash     Methyldibromoglutaronitrile Dermatitis    Permethrin Rash     Rash     Sulfa (sulfonamide antibiotics) Other (See Comments) and Rash     unknown       Current Outpatient Medications:     amLODIPine (NORVASC) 2.5 MG tablet, Take 1 tablet (2.5 mg total) by mouth once daily., Disp: 90 tablet, Rfl: 0    ezetimibe (ZETIA) 10 mg tablet, TAKE 1 TABLET BY MOUTH ONCE DAILY IN THE EVENING, Disp: 90 tablet, Rfl: 0    benazepriL (LOTENSIN) 20 MG tablet, Take 1 tablet by mouth once daily, Disp: 90 tablet, Rfl: 0    buPROPion (WELLBUTRIN) 100 MG tablet, Take 1 tablet (100 mg total) by mouth 2 (two) times daily., Disp: 180 tablet, Rfl: 1    calcipotriene-betamethasone (TACLONEX) ointment, Apply topically to affected area twice daily, tapering with improvement., Disp: 100 g, Rfl: 1    calcium carbonate (OS-CAIN) 600 mg calcium (1,500  mg) Tab, Take 600 mg by mouth once daily., Disp: , Rfl:     coenzyme Q10 100 mg capsule, as directed Orally, Disp: , Rfl:     famotidine (PEPCID) 20 MG tablet, Take 1 tablet (20 mg total) by mouth 2 (two) times daily., Disp: 180 tablet, Rfl: 1    LINZESS 145 mcg Cap capsule, TAKE 1 CAPSULE BY MOUTH ONCE DAILY BEFORE  BREAKFAST, Disp: 30 capsule, Rfl: 0    mupirocin (BACTROBAN) 2 % ointment, Apply to affected areas TID, Disp: 30 g, Rfl: 2    oxyCODONE-acetaminophen (PERCOCET)  mg per tablet, Take 1 tablet by mouth 3 (three) times daily as needed., Disp: , Rfl:     RINVOQ 15 mg 24 hr tablet, Take 15 mg by mouth once daily., Disp: , Rfl:     tiZANidine (ZANAFLEX) 4 MG tablet, Take 1 tablet by mouth twice daily, Disp: 90 tablet, Rfl: 0    valACYclovir (VALTREX) 1000 MG tablet, Take 2 tablets b.i.d. x1 day as needed for a cold sore (Patient not taking: Reported on 2/2/2024), Disp: 4 tablet, Rfl: 11    valACYclovir (VALTREX) 500 MG tablet, Take 1 tablet (500 mg total) by mouth once daily., Disp: 30 tablet, Rfl: 11    varenicline (CHANTIX) 1 mg Tab, Take 1 tablet by mouth twice daily, Disp: 56 tablet, Rfl: 0    zinc gluconate 50 mg tablet, Take 50 mg by mouth once daily., Disp: , Rfl:     ROS: A focused review of systems was obtained and negative.     Exam: A focused skin exam was performed. All areas examined were normal except as mentioned in the assessment and plan below.  General Appearance of the patient is well developed and well nourished.  Orientation: alert and oriented x 3.  Mood and affect: pleasant.    Assessment:   The encounter diagnosis was Skin infection.    Plan:        Skin Infection, NOS      Plan: Counseling  I counseled the patient regarding the following:  Skin care: Patients with purulence or fluid collections should have their wounds re-opened, drained, cultured and irrigated. All wound infections should be treated with antibiotics.  Expectations: Wound Infections usually occur 4-7 days  postoperatively. Patients exhibit, pain, rednes, swelling, cellulitic changes and fever.  Contact Office if: Wound Infection fails to respond to treatment or worsens, patient develops a fever, or if redness spreads despite antibiotics.     A bacterial culture was obtained from the bilateral arms and face    Follow up if symptoms worsen or fail to improve.    Abelino Mckeon RN

## 2024-03-10 LAB — MICROORGANISM SPEC CULT: NORMAL

## 2024-03-22 DIAGNOSIS — K59.04 CHRONIC IDIOPATHIC CONSTIPATION: ICD-10-CM

## 2024-03-31 ENCOUNTER — EXTERNAL CHRONIC CARE MANAGEMENT (OUTPATIENT)
Dept: FAMILY MEDICINE | Facility: CLINIC | Age: 64
End: 2024-03-31
Payer: MEDICARE

## 2024-03-31 PROCEDURE — G0511 CCM/BHI BY RHC/FQHC 20MIN MO: HCPCS | Mod: ,,, | Performed by: FAMILY MEDICINE

## 2024-04-01 DIAGNOSIS — F17.200 SMOKER: ICD-10-CM

## 2024-04-01 RX ORDER — VARENICLINE TARTRATE 1 MG/1
1 TABLET, FILM COATED ORAL 2 TIMES DAILY
Qty: 56 TABLET | Refills: 0 | Status: SHIPPED | OUTPATIENT
Start: 2024-04-01 | End: 2024-04-29

## 2024-04-23 ENCOUNTER — OFFICE VISIT (OUTPATIENT)
Dept: FAMILY MEDICINE | Facility: CLINIC | Age: 64
End: 2024-04-23
Payer: MEDICARE

## 2024-04-23 VITALS
HEART RATE: 68 BPM | TEMPERATURE: 98 F | OXYGEN SATURATION: 99 % | HEIGHT: 67 IN | WEIGHT: 189 LBS | RESPIRATION RATE: 18 BRPM | SYSTOLIC BLOOD PRESSURE: 108 MMHG | BODY MASS INDEX: 29.66 KG/M2 | DIASTOLIC BLOOD PRESSURE: 68 MMHG

## 2024-04-23 DIAGNOSIS — R05.1 ACUTE COUGH: ICD-10-CM

## 2024-04-23 DIAGNOSIS — J22 ACUTE LOWER RESPIRATORY INFECTION: Primary | ICD-10-CM

## 2024-04-23 PROCEDURE — 4010F ACE/ARB THERAPY RXD/TAKEN: CPT | Mod: ,,, | Performed by: NURSE PRACTITIONER

## 2024-04-23 PROCEDURE — 3008F BODY MASS INDEX DOCD: CPT | Mod: ,,, | Performed by: NURSE PRACTITIONER

## 2024-04-23 PROCEDURE — 96372 THER/PROPH/DIAG INJ SC/IM: CPT | Mod: ,,, | Performed by: NURSE PRACTITIONER

## 2024-04-23 PROCEDURE — 1160F RVW MEDS BY RX/DR IN RCRD: CPT | Mod: ,,, | Performed by: NURSE PRACTITIONER

## 2024-04-23 PROCEDURE — 99213 OFFICE O/P EST LOW 20 MIN: CPT | Mod: 25,,, | Performed by: NURSE PRACTITIONER

## 2024-04-23 PROCEDURE — 1159F MED LIST DOCD IN RCRD: CPT | Mod: ,,, | Performed by: NURSE PRACTITIONER

## 2024-04-23 PROCEDURE — 3078F DIAST BP <80 MM HG: CPT | Mod: ,,, | Performed by: NURSE PRACTITIONER

## 2024-04-23 PROCEDURE — 3074F SYST BP LT 130 MM HG: CPT | Mod: ,,, | Performed by: NURSE PRACTITIONER

## 2024-04-23 RX ORDER — PROMETHAZINE HYDROCHLORIDE AND DEXTROMETHORPHAN HYDROBROMIDE 6.25; 15 MG/5ML; MG/5ML
5 SYRUP ORAL EVERY 4 HOURS PRN
Qty: 118 ML | Refills: 0 | Status: SHIPPED | OUTPATIENT
Start: 2024-04-23 | End: 2024-05-03

## 2024-04-23 RX ORDER — AZITHROMYCIN 250 MG/1
TABLET, FILM COATED ORAL
Qty: 6 TABLET | Refills: 0 | Status: SHIPPED | OUTPATIENT
Start: 2024-04-23

## 2024-04-23 RX ORDER — BETAMETHASONE SODIUM PHOSPHATE AND BETAMETHASONE ACETATE 3; 3 MG/ML; MG/ML
6 INJECTION, SUSPENSION INTRA-ARTICULAR; INTRALESIONAL; INTRAMUSCULAR; SOFT TISSUE
Status: COMPLETED | OUTPATIENT
Start: 2024-04-23 | End: 2024-04-23

## 2024-04-23 RX ORDER — OXYCODONE 9 MG/1
1 CAPSULE, EXTENDED RELEASE ORAL EVERY 12 HOURS
COMMUNITY
Start: 2024-04-10

## 2024-04-23 RX ADMIN — BETAMETHASONE SODIUM PHOSPHATE AND BETAMETHASONE ACETATE 6 MG: 3; 3 INJECTION, SUSPENSION INTRA-ARTICULAR; INTRALESIONAL; INTRAMUSCULAR; SOFT TISSUE at 03:04

## 2024-04-23 NOTE — PROGRESS NOTES
[facility] Medicine    Chief Complaint      Chief Complaint   Patient presents with    Chest Congestion     Onset of approximately 2-3 days; pt has been treating with otc theraflu with no relief. No other sx noted. Pt defers flu/covid swabs at this time    Health Maintenance     Care gaps not assessed       History of Present Illness      Janene Castillo is a 64 y.o. female. She  has a past medical history of Allergic contact dermatitis, Anxiety, Chronic pain, HTN (hypertension), Hyperlipidemia, Plaque psoriasis, Prurigo nodularis, Psoriatic arthritis, and Stage 3b chronic kidney disease., who presents today for congestion x2-3 days. States that she is coughing up thick drainage, is hoarse, has ST, and fatigue.  Refused Covid and flu testing today.    Past Medical History:  Past Medical History:   Diagnosis Date    Allergic contact dermatitis     balsam of peru, bacitracin, fragrance, methyldibromo glutaronite    Anxiety     Chronic pain     HTN (hypertension)     Hyperlipidemia     Plaque psoriasis     Prurigo nodularis     Psoriatic arthritis     Stage 3b chronic kidney disease        Past Surgical History:   has a past surgical history that includes  section and ANGIOGRAM, CORONARY, WITH LEFT HEART CATHETERIZATION (N/A, 2022).    Social History:  Social History     Tobacco Use    Smoking status: Former     Current packs/day: 1.00     Average packs/day: 1 pack/day for 44.0 years (44.0 ttl pk-yrs)     Types: Cigarettes     Passive exposure: Past    Smokeless tobacco: Never   Substance Use Topics    Alcohol use: Not Currently    Drug use: Never       I personally reviewed all past medical, surgical, and social.     Review of Systems   Constitutional:  Negative for chills and fever.   HENT:  Positive for congestion, ear pain, postnasal drip and sore throat. Negative for rhinorrhea and sneezing.    Eyes:  Negative for visual disturbance.   Respiratory:  Positive for cough. Negative for shortness of breath.     Cardiovascular: Negative.    Gastrointestinal:  Negative for diarrhea, nausea and vomiting.   Musculoskeletal:  Negative for myalgias.   Skin:  Negative for rash.   Neurological:  Negative for headaches.      Medications:  Outpatient Encounter Medications as of 4/23/2024   Medication Sig Dispense Refill    amLODIPine (NORVASC) 2.5 MG tablet Take 1 tablet (2.5 mg total) by mouth once daily. 90 tablet 0    benazepriL (LOTENSIN) 20 MG tablet Take 1 tablet by mouth once daily 90 tablet 0    buPROPion (WELLBUTRIN) 100 MG tablet Take 1 tablet (100 mg total) by mouth 2 (two) times daily. 180 tablet 1    calcipotriene-betamethasone (TACLONEX) ointment Apply topically to affected area twice daily, tapering with improvement. 100 g 1    coenzyme Q10 100 mg capsule as directed Orally      ezetimibe (ZETIA) 10 mg tablet TAKE 1 TABLET BY MOUTH ONCE DAILY IN THE EVENING 90 tablet 0    linaCLOtide (LINZESS) 145 mcg Cap capsule Take 1 capsule (145 mcg total) by mouth before breakfast. 30 capsule 3    mupirocin (BACTROBAN) 2 % ointment Apply to affected areas TID 30 g 2    RINVOQ 15 mg 24 hr tablet Take 15 mg by mouth once daily.      tiZANidine (ZANAFLEX) 4 MG tablet Take 1 tablet by mouth twice daily 90 tablet 0    valACYclovir (VALTREX) 500 MG tablet Take 1 tablet (500 mg total) by mouth once daily. 30 tablet 11    varenicline (CHANTIX) 1 mg Tab Take 1 tablet by mouth twice daily 56 tablet 0    XTAMPZA ER 9 mg CSpT Take 1 capsule by mouth every 12 (twelve) hours.      zinc gluconate 50 mg tablet Take 50 mg by mouth once daily.      azithromycin (Z-RODO) 250 MG tablet Take 2 tablets by mouth on day 1; Take 1 tablet by mouth on days 2-5 6 tablet 0    calcium carbonate (OS-CAIN) 600 mg calcium (1,500 mg) Tab Take 600 mg by mouth once daily. (Patient not taking: Reported on 4/23/2024)      doxycycline (VIBRAMYCIN) 100 MG Cap Take one capsule by mouth twice daily. WEAR SUNSCREEN WHILE TAKING. (Patient not taking: Reported on  4/23/2024) 28 capsule 0    famotidine (PEPCID) 20 MG tablet Take 1 tablet (20 mg total) by mouth 2 (two) times daily. (Patient not taking: Reported on 4/23/2024) 180 tablet 1    oxyCODONE-acetaminophen (PERCOCET)  mg per tablet Take 1 tablet by mouth 3 (three) times daily as needed. (Patient not taking: Reported on 4/23/2024)      promethazine-dextromethorphan (PROMETHAZINE-DM) 6.25-15 mg/5 mL Syrp Take 5 mLs by mouth every 4 (four) hours as needed (Cough). 118 mL 0    valACYclovir (VALTREX) 1000 MG tablet Take 2 tablets b.i.d. x1 day as needed for a cold sore (Patient not taking: Reported on 2/2/2024) 4 tablet 11     Facility-Administered Encounter Medications as of 4/23/2024   Medication Dose Route Frequency Provider Last Rate Last Admin    betamethasone acetate-betamethasone sodium phosphate injection 6 mg  6 mg Intramuscular 1 time in Clinic/HOD Mariah Ellis FNP           Allergies:  Review of patient's allergies indicates:   Allergen Reactions    Bacitracin Dermatitis    Codeine sulfate     Codeine Rash     Rash     Methyldibromoglutaronitrile Dermatitis    Permethrin Rash     Rash     Sulfa (sulfonamide antibiotics) Other (See Comments) and Rash     unknown       Health Maintenance:  Immunization History   Administered Date(s) Administered    COVID-19 MRNA, LN-S PF (MODERNA HALF 0.25 ML DOSE) 04/26/2022    COVID-19, MRNA, LN-S, PF (MODERNA FULL 0.5 ML DOSE) 06/22/2021, 08/06/2021    COVID-19, mRNA, LNP-S, bivalent booster, PF (Moderna Omicron)12 + YEARS 11/16/2022      Health Maintenance   Topic Date Due    Hepatitis C Screening  Never done    TETANUS VACCINE  Never done    Aspirin/Antiplatelet Therapy  Never done    Shingles Vaccine (1 of 2) Never done    Mammogram  08/28/2024    Lipid Panel  01/05/2029    Colorectal Cancer Screening  12/22/2030        Physical Exam      Vital Signs  Temp: 98.4 °F (36.9 °C)  Temp Source: Oral  Pulse: 68  Resp: 18  SpO2: 99 %  BP: 108/68  BP Location: Right  "arm  Patient Position: Sitting  Pain Score:   4  Pain Loc: Foot (and neck)  Height and Weight  Height: 5' 7" (170.2 cm)  Weight: 85.7 kg (189 lb)  BSA (Calculated - sq m): 2.01 sq meters  BMI (Calculated): 29.6  Weight in (lb) to have BMI = 25: 159.3]    Physical Exam  Vitals and nursing note reviewed.   Constitutional:       General: She is not in acute distress.     Appearance: Normal appearance. She is well-developed.   HENT:      Head: Normocephalic.      Right Ear: Hearing, tympanic membrane, ear canal and external ear normal.      Left Ear: Hearing, tympanic membrane, ear canal and external ear normal.      Nose: Congestion present.      Mouth/Throat:      Lips: Pink.      Pharynx: Oropharynx is clear.      Comments: Clear PND  Eyes:      General: Lids are normal.      Conjunctiva/sclera: Conjunctivae normal.   Cardiovascular:      Rate and Rhythm: Normal rate and regular rhythm.      Heart sounds: Normal heart sounds.   Pulmonary:      Effort: Pulmonary effort is normal.      Breath sounds: Normal breath sounds.   Musculoskeletal:         General: Normal range of motion.      Cervical back: Normal range of motion and neck supple.   Skin:     General: Skin is warm and dry.   Neurological:      Mental Status: She is alert and oriented to person, place, and time.      Gait: Gait is intact.   Psychiatric:         Behavior: Behavior is cooperative.          Laboratory:  CBC:  Recent Labs   Lab 06/23/23  0945 06/30/23  1004 01/05/24  1019   WBC 3.47 L 4.68 3.47 L   RBC 3.96 L 3.97 L 3.71 L   Hemoglobin 11.6 L 11.6 L 10.9 L   Hematocrit 37.6 L 37.7 L 34.2 L   Platelet Count 284 290 281   MCV 94.9 95.0 92.2   MCH 29.3 29.2 29.4   MCHC 30.9 L 30.8 L 31.9 L     CMP:  Recent Labs   Lab 06/23/23  0945 06/30/23  1004 01/05/24  1019   Glucose 99 91 93   Calcium 9.4 9.4 9.3   Albumin 4.0 4.1 3.8   Total Protein 7.1 7.3 7.0   Sodium 141 142 140   Potassium 5.3 H 4.7 5.5 H   CO2 27 28 28   Chloride 109 H 107 110 H   BUN 25 H " 16 21 H   Alk Phos 133 H 126 116   ALT 21 24 28   AST 16 22 16   Bilirubin, Total 0.3 0.4 0.4     LIPIDS:  Recent Labs   Lab 06/07/21  0722 10/25/21  0821 06/23/23  0945 06/30/23  1004 01/05/24  1019   TSH 2.010  --   --   --   --    HDL Cholesterol 46   < > 64 H 64 H 65 H   Cholesterol 175   < > 199 193 181   Triglycerides 145   < > 95 108 85   LDL Calculated 100   < > 116 107 99   Cholesterol/HDL Ratio (Risk Factor) 3.8   < > 3.1 3.0 2.8   Non-   < > 135 129 116    < > = values in this interval not displayed.     TSH:  Recent Labs   Lab 06/07/21  0722   TSH 2.010     A1C:        Assessment/Plan     Janene Castillo is a 64 y.o.female with:     1. Acute lower respiratory infection  -     azithromycin (Z-RODO) 250 MG tablet; Take 2 tablets by mouth on day 1; Take 1 tablet by mouth on days 2-5  Dispense: 6 tablet; Refill: 0  -     betamethasone acetate-betamethasone sodium phosphate injection 6 mg    2. Acute cough  -     promethazine-dextromethorphan (PROMETHAZINE-DM) 6.25-15 mg/5 mL Syrp; Take 5 mLs by mouth every 4 (four) hours as needed (Cough).  Dispense: 118 mL; Refill: 0         Total time spent face-to-face and non-face-to-face coordinating care for this encounter was: 20 minutes     Chronic conditions status updated as per HPI.  Other than changes above, cont current medications and maintain follow up with specialists.  Return to clinic prn if symptoms worsen or fail to improve.    Mariah Ellis, FNP  [facility]  Reviewed on 05/15/2024 by Asael Huizar MD

## 2024-04-29 DIAGNOSIS — F17.200 SMOKER: ICD-10-CM

## 2024-04-29 RX ORDER — VARENICLINE TARTRATE 1 MG/1
1 TABLET, FILM COATED ORAL 2 TIMES DAILY
Qty: 56 TABLET | Refills: 0 | Status: SHIPPED | OUTPATIENT
Start: 2024-04-29 | End: 2024-06-11

## 2024-04-30 ENCOUNTER — EXTERNAL CHRONIC CARE MANAGEMENT (OUTPATIENT)
Dept: FAMILY MEDICINE | Facility: CLINIC | Age: 64
End: 2024-04-30
Payer: MEDICARE

## 2024-04-30 PROCEDURE — G0511 CCM/BHI BY RHC/FQHC 20MIN MO: HCPCS | Mod: ,,, | Performed by: FAMILY MEDICINE

## 2024-05-11 DIAGNOSIS — M62.838 SPASM OF MUSCLE: ICD-10-CM

## 2024-05-11 DIAGNOSIS — E78.5 HYPERLIPIDEMIA, UNSPECIFIED HYPERLIPIDEMIA TYPE: ICD-10-CM

## 2024-05-13 RX ORDER — TIZANIDINE 4 MG/1
4 TABLET ORAL 2 TIMES DAILY
Qty: 90 TABLET | Refills: 0 | Status: SHIPPED | OUTPATIENT
Start: 2024-05-13

## 2024-05-13 RX ORDER — EZETIMIBE 10 MG/1
10 TABLET ORAL NIGHTLY
Qty: 30 TABLET | Refills: 0 | Status: SHIPPED | OUTPATIENT
Start: 2024-05-13

## 2024-05-23 DIAGNOSIS — I10 HYPERTENSION, UNSPECIFIED TYPE: ICD-10-CM

## 2024-05-25 RX ORDER — BENAZEPRIL HYDROCHLORIDE 20 MG/1
20 TABLET ORAL
Qty: 90 TABLET | Refills: 1 | Status: SHIPPED | OUTPATIENT
Start: 2024-05-25

## 2024-05-31 ENCOUNTER — EXTERNAL CHRONIC CARE MANAGEMENT (OUTPATIENT)
Dept: FAMILY MEDICINE | Facility: CLINIC | Age: 64
End: 2024-05-31
Payer: MEDICARE

## 2024-05-31 PROCEDURE — G0511 CCM/BHI BY RHC/FQHC 20MIN MO: HCPCS | Mod: ,,, | Performed by: FAMILY MEDICINE

## 2024-06-07 DIAGNOSIS — F17.200 SMOKER: ICD-10-CM

## 2024-06-11 RX ORDER — VARENICLINE TARTRATE 1 MG/1
1 TABLET, FILM COATED ORAL 2 TIMES DAILY
Qty: 56 TABLET | Refills: 0 | Status: SHIPPED | OUTPATIENT
Start: 2024-06-11

## 2024-06-26 DIAGNOSIS — I10 HYPERTENSION, UNSPECIFIED TYPE: ICD-10-CM

## 2024-06-26 RX ORDER — AMLODIPINE BESYLATE 2.5 MG/1
2.5 TABLET ORAL
Qty: 90 TABLET | Refills: 0 | Status: SHIPPED | OUTPATIENT
Start: 2024-06-26

## 2024-06-27 DIAGNOSIS — M62.838 SPASM OF MUSCLE: ICD-10-CM

## 2024-06-27 RX ORDER — TIZANIDINE 4 MG/1
4 TABLET ORAL 2 TIMES DAILY
Qty: 180 TABLET | Refills: 1 | Status: SHIPPED | OUTPATIENT
Start: 2024-06-27

## 2024-06-30 ENCOUNTER — EXTERNAL CHRONIC CARE MANAGEMENT (OUTPATIENT)
Dept: FAMILY MEDICINE | Facility: CLINIC | Age: 64
End: 2024-06-30
Payer: MEDICARE

## 2024-06-30 PROCEDURE — G0511 CCM/BHI BY RHC/FQHC 20MIN MO: HCPCS | Mod: ,,, | Performed by: FAMILY MEDICINE

## 2024-07-02 ENCOUNTER — OFFICE VISIT (OUTPATIENT)
Dept: FAMILY MEDICINE | Facility: CLINIC | Age: 64
End: 2024-07-02
Payer: MEDICARE

## 2024-07-02 VITALS
SYSTOLIC BLOOD PRESSURE: 112 MMHG | BODY MASS INDEX: 30.48 KG/M2 | RESPIRATION RATE: 18 BRPM | DIASTOLIC BLOOD PRESSURE: 70 MMHG | OXYGEN SATURATION: 99 % | HEIGHT: 67 IN | WEIGHT: 194.19 LBS | TEMPERATURE: 98 F | HEART RATE: 50 BPM

## 2024-07-02 DIAGNOSIS — G57.61 MORTON'S NEUROMA OF RIGHT FOOT: ICD-10-CM

## 2024-07-02 DIAGNOSIS — M50.30 DEGENERATIVE DISC DISEASE, CERVICAL: ICD-10-CM

## 2024-07-02 DIAGNOSIS — B00.1 HERPES LABIALIS: ICD-10-CM

## 2024-07-02 DIAGNOSIS — E78.5 HYPERLIPIDEMIA, UNSPECIFIED HYPERLIPIDEMIA TYPE: Primary | ICD-10-CM

## 2024-07-02 DIAGNOSIS — I10 HYPERTENSION, UNSPECIFIED TYPE: ICD-10-CM

## 2024-07-02 LAB
ALBUMIN SERPL BCP-MCNC: 3.9 G/DL (ref 3.5–5)
ALBUMIN/GLOB SERPL: 1.4 {RATIO}
ALP SERPL-CCNC: 122 U/L (ref 50–130)
ALT SERPL W P-5'-P-CCNC: 24 U/L (ref 13–56)
ANION GAP SERPL CALCULATED.3IONS-SCNC: 9 MMOL/L (ref 7–16)
AST SERPL W P-5'-P-CCNC: 23 U/L (ref 15–37)
BASOPHILS # BLD AUTO: 0.02 K/UL (ref 0–0.2)
BASOPHILS NFR BLD AUTO: 0.6 % (ref 0–1)
BILIRUB SERPL-MCNC: 0.4 MG/DL (ref ?–1.2)
BUN SERPL-MCNC: 19 MG/DL (ref 7–18)
BUN/CREAT SERPL: 15 (ref 6–20)
CALCIUM SERPL-MCNC: 9 MG/DL (ref 8.5–10.1)
CHLORIDE SERPL-SCNC: 110 MMOL/L (ref 98–107)
CHOLEST SERPL-MCNC: 185 MG/DL (ref 0–200)
CHOLEST/HDLC SERPL: 3.3 {RATIO}
CO2 SERPL-SCNC: 28 MMOL/L (ref 21–32)
CREAT SERPL-MCNC: 1.24 MG/DL (ref 0.55–1.02)
DIFFERENTIAL METHOD BLD: ABNORMAL
EGFR (NO RACE VARIABLE) (RUSH/TITUS): 49 ML/MIN/1.73M2
EOSINOPHIL # BLD AUTO: 0.08 K/UL (ref 0–0.5)
EOSINOPHIL NFR BLD AUTO: 2.3 % (ref 1–4)
ERYTHROCYTE [DISTWIDTH] IN BLOOD BY AUTOMATED COUNT: 13.2 % (ref 11.5–14.5)
GLOBULIN SER-MCNC: 2.7 G/DL (ref 2–4)
GLUCOSE SERPL-MCNC: 82 MG/DL (ref 74–106)
HCT VFR BLD AUTO: 36 % (ref 38–47)
HDLC SERPL-MCNC: 56 MG/DL (ref 40–60)
HGB BLD-MCNC: 10.8 G/DL (ref 12–16)
IMM GRANULOCYTES # BLD AUTO: 0.01 K/UL (ref 0–0.04)
IMM GRANULOCYTES NFR BLD: 0.3 % (ref 0–0.4)
LDLC SERPL CALC-MCNC: 108 MG/DL
LDLC/HDLC SERPL: 1.9 {RATIO}
LYMPHOCYTES # BLD AUTO: 1.36 K/UL (ref 1–4.8)
LYMPHOCYTES NFR BLD AUTO: 39.1 % (ref 27–41)
MCH RBC QN AUTO: 29.3 PG (ref 27–31)
MCHC RBC AUTO-ENTMCNC: 30 G/DL (ref 32–36)
MCV RBC AUTO: 97.8 FL (ref 80–96)
MONOCYTES # BLD AUTO: 0.49 K/UL (ref 0–0.8)
MONOCYTES NFR BLD AUTO: 14.1 % (ref 2–6)
MPC BLD CALC-MCNC: 10 FL (ref 9.4–12.4)
NEUTROPHILS # BLD AUTO: 1.52 K/UL (ref 1.8–7.7)
NEUTROPHILS NFR BLD AUTO: 43.6 % (ref 53–65)
NONHDLC SERPL-MCNC: 129 MG/DL
NRBC # BLD AUTO: 0 X10E3/UL
NRBC, AUTO (.00): 0 %
PLATELET # BLD AUTO: 263 K/UL (ref 150–400)
POTASSIUM SERPL-SCNC: 5.2 MMOL/L (ref 3.5–5.1)
PROT SERPL-MCNC: 6.6 G/DL (ref 6.4–8.2)
RBC # BLD AUTO: 3.68 M/UL (ref 4.2–5.4)
SODIUM SERPL-SCNC: 142 MMOL/L (ref 136–145)
TRIGL SERPL-MCNC: 107 MG/DL (ref 35–150)
VLDLC SERPL-MCNC: 21 MG/DL
WBC # BLD AUTO: 3.48 K/UL (ref 4.5–11)

## 2024-07-02 PROCEDURE — 80061 LIPID PANEL: CPT | Mod: ,,, | Performed by: CLINICAL MEDICAL LABORATORY

## 2024-07-02 PROCEDURE — 3078F DIAST BP <80 MM HG: CPT | Mod: ,,, | Performed by: FAMILY MEDICINE

## 2024-07-02 PROCEDURE — 1159F MED LIST DOCD IN RCRD: CPT | Mod: ,,, | Performed by: FAMILY MEDICINE

## 2024-07-02 PROCEDURE — 1160F RVW MEDS BY RX/DR IN RCRD: CPT | Mod: ,,, | Performed by: FAMILY MEDICINE

## 2024-07-02 PROCEDURE — 4010F ACE/ARB THERAPY RXD/TAKEN: CPT | Mod: ,,, | Performed by: FAMILY MEDICINE

## 2024-07-02 PROCEDURE — 3008F BODY MASS INDEX DOCD: CPT | Mod: ,,, | Performed by: FAMILY MEDICINE

## 2024-07-02 PROCEDURE — 3074F SYST BP LT 130 MM HG: CPT | Mod: ,,, | Performed by: FAMILY MEDICINE

## 2024-07-02 PROCEDURE — 85025 COMPLETE CBC W/AUTO DIFF WBC: CPT | Mod: ,,, | Performed by: CLINICAL MEDICAL LABORATORY

## 2024-07-02 PROCEDURE — 99214 OFFICE O/P EST MOD 30 MIN: CPT | Mod: ,,, | Performed by: FAMILY MEDICINE

## 2024-07-02 PROCEDURE — 80053 COMPREHEN METABOLIC PANEL: CPT | Mod: ,,, | Performed by: CLINICAL MEDICAL LABORATORY

## 2024-07-02 RX ORDER — VALACYCLOVIR HYDROCHLORIDE 1 G/1
TABLET, FILM COATED ORAL
Qty: 4 TABLET | Refills: 11 | Status: SHIPPED | OUTPATIENT
Start: 2024-07-02

## 2024-07-02 NOTE — PROGRESS NOTES
Janene Castillo is a 64 y.o. female seen today for follow-up on her hypertension and elevated cholesterol.  She is fasting today for follow-up lab work.  Also, patient will need to be set up for her annual wellness visit.  She is up-to-date on her mammogram and colon cancer screening and denies any chest pain or shortness a breath.  She does have worsening neck pain and right foot pain secondary to cervical disc disease and a Waters's neuroma on the right side.  We discussed a physical therapy and podiatry evaluation.    Past Medical History:   Diagnosis Date    Allergic contact dermatitis     balsam of peru, bacitracin, fragrance, methyldibromo glutaronite    Anxiety     Chronic pain     HTN (hypertension)     Hyperlipidemia     Plaque psoriasis     Prurigo nodularis     Psoriatic arthritis     Stage 3b chronic kidney disease      Family History   Problem Relation Name Age of Onset    Hypertension Mother      Heart disease Father      Heart attack Father      Heart failure Father      Pacemaker/defibrilator Father      Melanoma Neg Hx       Current Outpatient Medications on File Prior to Visit   Medication Sig Dispense Refill    amLODIPine (NORVASC) 2.5 MG tablet Take 1 tablet by mouth once daily 90 tablet 0    benazepriL (LOTENSIN) 20 MG tablet Take 1 tablet by mouth once daily 90 tablet 1    buPROPion (WELLBUTRIN) 100 MG tablet Take 1 tablet (100 mg total) by mouth 2 (two) times daily. 180 tablet 1    calcipotriene-betamethasone (TACLONEX) ointment Apply topically to affected area twice daily, tapering with improvement. 100 g 1    calcium carbonate (OS-CAIN) 600 mg calcium (1,500 mg) Tab Take 600 mg by mouth once daily.      coenzyme Q10 100 mg capsule as directed Orally      ezetimibe (ZETIA) 10 mg tablet TAKE 1 TABLET BY MOUTH ONCE DAILY IN THE EVENING 30 tablet 0    linaCLOtide (LINZESS) 145 mcg Cap capsule Take 1 capsule (145 mcg total) by mouth before breakfast. 30 capsule 3    mupirocin (BACTROBAN) 2 %  ointment Apply to affected areas TID 30 g 2    oxyCODONE-acetaminophen (PERCOCET)  mg per tablet Take 1 tablet by mouth 3 (three) times daily as needed.      RINVOQ 15 mg 24 hr tablet Take 15 mg by mouth once daily.      tiZANidine (ZANAFLEX) 4 MG tablet Take 1 tablet by mouth twice daily 180 tablet 1    varenicline (CHANTIX) 1 mg Tab Take 1 tablet by mouth twice daily 56 tablet 0    XTAMPZA ER 9 mg CSpT Take 1 capsule by mouth every 12 (twelve) hours.      zinc gluconate 50 mg tablet Take 50 mg by mouth once daily.      [DISCONTINUED] valACYclovir (VALTREX) 1000 MG tablet Take 2 tablets b.i.d. x1 day as needed for a cold sore 4 tablet 11    azithromycin (Z-RODO) 250 MG tablet Take 2 tablets by mouth on day 1; Take 1 tablet by mouth on days 2-5 (Patient not taking: Reported on 7/2/2024) 6 tablet 0    doxycycline (VIBRAMYCIN) 100 MG Cap Take one capsule by mouth twice daily. WEAR SUNSCREEN WHILE TAKING. (Patient not taking: Reported on 4/23/2024) 28 capsule 0    famotidine (PEPCID) 20 MG tablet Take 1 tablet (20 mg total) by mouth 2 (two) times daily. (Patient not taking: Reported on 4/23/2024) 180 tablet 1    [DISCONTINUED] valACYclovir (VALTREX) 500 MG tablet Take 1 tablet (500 mg total) by mouth once daily. (Patient not taking: Reported on 7/2/2024) 30 tablet 11     No current facility-administered medications on file prior to visit.     Immunization History   Administered Date(s) Administered    COVID-19 MRNA, LN-S PF (MODERNA HALF 0.25 ML DOSE) 04/26/2022    COVID-19, MRNA, LN-S, PF (MODERNA FULL 0.5 ML DOSE) 06/22/2021, 08/06/2021    COVID-19, mRNA, LNP-S, bivalent booster, PF (Moderna Omicron)12 + YEARS 11/16/2022       Review of Systems   Constitutional:  Negative for fever, malaise/fatigue and weight loss.   Respiratory:  Negative for shortness of breath.    Cardiovascular:  Negative for chest pain and palpitations.   Gastrointestinal:  Negative for nausea and vomiting.   Musculoskeletal:  Positive for  joint pain, myalgias and neck pain.   Psychiatric/Behavioral:  Negative for depression.         Vitals:    07/02/24 0842   BP: 112/70   Pulse: (!) 50   Resp: 18   Temp: 98.3 °F (36.8 °C)       Physical Exam  Vitals reviewed.   Constitutional:       Appearance: Normal appearance.   HENT:      Head: Normocephalic.   Eyes:      Extraocular Movements: Extraocular movements intact.      Conjunctiva/sclera: Conjunctivae normal.      Pupils: Pupils are equal, round, and reactive to light.   Neck:      Thyroid: No thyroid mass or thyromegaly.   Cardiovascular:      Rate and Rhythm: Normal rate and regular rhythm.      Heart sounds: Normal heart sounds. No murmur heard.     No gallop.   Pulmonary:      Effort: Pulmonary effort is normal. No respiratory distress.      Breath sounds: Normal breath sounds. No wheezing or rales.   Musculoskeletal:      Cervical back: Decreased range of motion.   Skin:     General: Skin is warm and dry.      Coloration: Skin is not jaundiced or pale.   Neurological:      Mental Status: She is alert.   Psychiatric:         Mood and Affect: Mood normal.         Behavior: Behavior normal.         Thought Content: Thought content normal.         Judgment: Judgment normal.          Assessment and Plan  1. Hyperlipidemia, unspecified hyperlipidemia type  -     Lipid Panel; Future; Expected date: 07/02/2024    2. Hypertension, unspecified type  -     CBC Auto Differential; Future; Expected date: 07/02/2024  -     Comprehensive Metabolic Panel; Future; Expected date: 07/02/2024    3. Herpes labialis  -     valACYclovir (VALTREX) 1000 MG tablet; Take 2 tablets b.i.d. x1 day as needed for a cold sore  Dispense: 4 tablet; Refill: 11    4. Waters's neuroma of right foot  -     Ambulatory referral/consult to Podiatry; Future; Expected date: 07/09/2024    5. Degenerative disc disease, cervical  -     Ambulatory referral/consult to Physical/Occupational Therapy; Future; Expected date: 07/09/2024              Return to clinic in 6 months and also for her annual wellness visit or as needed once her lab work is in.    Health Maintenance Topics with due status: Not Due       Topic Last Completion Date    Colorectal Cancer Screening 12/22/2020    Cervical Cancer Screening 08/03/2023    Lipid Panel 01/05/2024    Influenza Vaccine Not Due

## 2024-07-03 ENCOUNTER — TELEPHONE (OUTPATIENT)
Dept: FAMILY MEDICINE | Facility: CLINIC | Age: 64
End: 2024-07-03
Payer: MEDICARE

## 2024-07-03 NOTE — TELEPHONE ENCOUNTER
----- Message from Asael Huizar MD sent at 7/3/2024  7:49 AM CDT -----  Office visit for renal function LDL cholesterol and neutropenia and anemia

## 2024-07-03 NOTE — TELEPHONE ENCOUNTER
Pt notified that Dr Huizar advised ov for lab review and she voiced understanding and was transferred to scheduling.

## 2024-07-15 DIAGNOSIS — F32.A DEPRESSION, UNSPECIFIED DEPRESSION TYPE: ICD-10-CM

## 2024-07-15 RX ORDER — BUPROPION HYDROCHLORIDE 100 MG/1
100 TABLET ORAL 2 TIMES DAILY
Qty: 180 TABLET | Refills: 1 | Status: SHIPPED | OUTPATIENT
Start: 2024-07-15

## 2024-07-19 DIAGNOSIS — K59.04 CHRONIC IDIOPATHIC CONSTIPATION: ICD-10-CM

## 2024-07-19 RX ORDER — LINACLOTIDE 145 UG/1
145 CAPSULE, GELATIN COATED ORAL
Qty: 30 CAPSULE | Refills: 0 | Status: SHIPPED | OUTPATIENT
Start: 2024-07-19

## 2024-07-22 ENCOUNTER — OFFICE VISIT (OUTPATIENT)
Dept: FAMILY MEDICINE | Facility: CLINIC | Age: 64
End: 2024-07-22
Payer: MEDICARE

## 2024-07-22 VITALS
RESPIRATION RATE: 18 BRPM | OXYGEN SATURATION: 100 % | HEIGHT: 67 IN | TEMPERATURE: 98 F | SYSTOLIC BLOOD PRESSURE: 114 MMHG | WEIGHT: 192 LBS | DIASTOLIC BLOOD PRESSURE: 72 MMHG | BODY MASS INDEX: 30.13 KG/M2 | HEART RATE: 66 BPM

## 2024-07-22 DIAGNOSIS — E78.5 HYPERLIPIDEMIA, UNSPECIFIED HYPERLIPIDEMIA TYPE: Primary | ICD-10-CM

## 2024-07-22 DIAGNOSIS — Z78.9 STATIN INTOLERANCE: ICD-10-CM

## 2024-07-22 PROCEDURE — 1159F MED LIST DOCD IN RCRD: CPT | Mod: ,,, | Performed by: FAMILY MEDICINE

## 2024-07-22 PROCEDURE — 1160F RVW MEDS BY RX/DR IN RCRD: CPT | Mod: ,,, | Performed by: FAMILY MEDICINE

## 2024-07-22 PROCEDURE — 3078F DIAST BP <80 MM HG: CPT | Mod: ,,, | Performed by: FAMILY MEDICINE

## 2024-07-22 PROCEDURE — 3074F SYST BP LT 130 MM HG: CPT | Mod: ,,, | Performed by: FAMILY MEDICINE

## 2024-07-22 PROCEDURE — 4010F ACE/ARB THERAPY RXD/TAKEN: CPT | Mod: ,,, | Performed by: FAMILY MEDICINE

## 2024-07-22 PROCEDURE — 3008F BODY MASS INDEX DOCD: CPT | Mod: ,,, | Performed by: FAMILY MEDICINE

## 2024-07-22 PROCEDURE — 99213 OFFICE O/P EST LOW 20 MIN: CPT | Mod: ,,, | Performed by: FAMILY MEDICINE

## 2024-07-22 RX ORDER — METHYLPREDNISOLONE SOD SUCC 125 MG
80 VIAL (EA) INJECTION ONCE
OUTPATIENT
Start: 2024-08-01

## 2024-07-22 RX ORDER — ACETAMINOPHEN 325 MG/1
650 TABLET ORAL
OUTPATIENT
Start: 2024-08-01

## 2024-07-22 RX ORDER — METHYLPREDNISOLONE SOD SUCC 125 MG
125 VIAL (EA) INJECTION
OUTPATIENT
Start: 2024-08-01

## 2024-07-22 RX ORDER — KETOROLAC TROMETHAMINE 30 MG/ML
15 INJECTION, SOLUTION INTRAMUSCULAR; INTRAVENOUS ONCE AS NEEDED
OUTPATIENT
Start: 2024-08-01

## 2024-07-22 RX ORDER — ONDANSETRON HYDROCHLORIDE 2 MG/ML
8 INJECTION, SOLUTION INTRAVENOUS ONCE AS NEEDED
OUTPATIENT
Start: 2024-08-01

## 2024-07-22 RX ORDER — EPINEPHRINE 0.3 MG/.3ML
0.3 INJECTION SUBCUTANEOUS ONCE AS NEEDED
OUTPATIENT
Start: 2024-08-01

## 2024-07-22 RX ORDER — DIPHENHYDRAMINE HYDROCHLORIDE 50 MG/ML
50 INJECTION INTRAMUSCULAR; INTRAVENOUS ONCE AS NEEDED
OUTPATIENT
Start: 2024-08-01

## 2024-07-22 NOTE — PROGRESS NOTES
Janene Castillo is a 64 y.o. female seen today for lab follow-up.  Patient's renal function has slightly declined and we discussed a follow-up with Dr. Quiroz her nephrologist.  Patient also has a persistent neutropenia and anemia and I have printed her labs were to follow-up with her rheumatologist regarding her medication.  She may need an evaluation by Hematology.  Patient's LDL cholesterol has worsened to 108 on Zetia and she does have a past medical history of hypertension and psoriatic arthritis.  This puts her at high risk for coronary artery disease and the patient also has a family history.  The patient is highly statin intolerant with severe muscle weakness and myalgias on multiple statin medications to include Lipitor and Crestor.      Past Medical History:   Diagnosis Date    Allergic contact dermatitis     balsam of peru, bacitracin, fragrance, methyldibromo glutaronite    Anxiety     Chronic pain     HTN (hypertension)     Hyperlipidemia     Plaque psoriasis     Prurigo nodularis     Psoriatic arthritis     Stage 3b chronic kidney disease      Family History   Problem Relation Name Age of Onset    Hypertension Mother      Heart disease Father      Heart attack Father      Heart failure Father      Pacemaker/defibrilator Father      Melanoma Neg Hx       Current Outpatient Medications on File Prior to Visit   Medication Sig Dispense Refill    amLODIPine (NORVASC) 2.5 MG tablet Take 1 tablet by mouth once daily 90 tablet 0    benazepriL (LOTENSIN) 20 MG tablet Take 1 tablet by mouth once daily 90 tablet 1    buPROPion (WELLBUTRIN) 100 MG tablet Take 1 tablet by mouth twice daily 180 tablet 1    calcipotriene-betamethasone (TACLONEX) ointment Apply topically to affected area twice daily, tapering with improvement. 100 g 1    calcium carbonate (OS-CAIN) 600 mg calcium (1,500 mg) Tab Take 600 mg by mouth once daily.      coenzyme Q10 100 mg capsule as directed Orally      ezetimibe (ZETIA) 10 mg tablet TAKE 1  TABLET BY MOUTH ONCE DAILY IN THE EVENING 30 tablet 0    famotidine (PEPCID) 20 MG tablet Take 1 tablet (20 mg total) by mouth 2 (two) times daily. 180 tablet 1    LINZESS 145 mcg Cap capsule TAKE 1 CAPSULE BY MOUTH ONCE DAILY BEFORE BREAKFAST 30 capsule 0    mupirocin (BACTROBAN) 2 % ointment Apply to affected areas TID 30 g 2    oxyCODONE-acetaminophen (PERCOCET)  mg per tablet Take 1 tablet by mouth 3 (three) times daily as needed.      RINVOQ 15 mg 24 hr tablet Take 15 mg by mouth once daily.      tiZANidine (ZANAFLEX) 4 MG tablet Take 1 tablet by mouth twice daily 180 tablet 1    valACYclovir (VALTREX) 1000 MG tablet Take 2 tablets b.i.d. x1 day as needed for a cold sore 4 tablet 11    varenicline (CHANTIX) 1 mg Tab Take 1 tablet by mouth twice daily 56 tablet 0    XTAMPZA ER 9 mg CSpT Take 1 capsule by mouth every 12 (twelve) hours.      zinc gluconate 50 mg tablet Take 50 mg by mouth once daily.      azithromycin (Z-RODO) 250 MG tablet Take 2 tablets by mouth on day 1; Take 1 tablet by mouth on days 2-5 (Patient not taking: Reported on 7/2/2024) 6 tablet 0    doxycycline (VIBRAMYCIN) 100 MG Cap Take one capsule by mouth twice daily. WEAR SUNSCREEN WHILE TAKING. (Patient not taking: Reported on 4/23/2024) 28 capsule 0     No current facility-administered medications on file prior to visit.     Immunization History   Administered Date(s) Administered    COVID-19 MRNA, LN-S PF (MODERNA HALF 0.25 ML DOSE) 04/26/2022    COVID-19, MRNA, LN-S, PF (MODERNA FULL 0.5 ML DOSE) 06/22/2021, 08/06/2021    COVID-19, mRNA, LNP-S, bivalent booster, PF (Moderna Omicron)12 + YEARS 11/16/2022       Review of Systems   Constitutional:  Negative for fever, malaise/fatigue and weight loss.   Respiratory:  Negative for shortness of breath.    Cardiovascular:  Negative for chest pain and palpitations.   Gastrointestinal:  Negative for nausea and vomiting.   Musculoskeletal:  Positive for joint pain, myalgias and neck pain.    Psychiatric/Behavioral:  Negative for depression.         Vitals:    07/22/24 1457   BP: 114/72   Pulse: 66   Resp: 18   Temp: 97.8 °F (36.6 °C)       Physical Exam  Vitals reviewed.   Constitutional:       Appearance: Normal appearance.   Eyes:      Conjunctiva/sclera: Conjunctivae normal.   Pulmonary:      Effort: Pulmonary effort is normal.   Psychiatric:         Mood and Affect: Mood normal.         Behavior: Behavior normal.         Thought Content: Thought content normal.         Judgment: Judgment normal.          Assessment and Plan  1. Hyperlipidemia, unspecified hyperlipidemia type  -     inclisiran 284 mg/1.5 mL subcutaneous injection 284 mg    2. Statin intolerance             Return to clinic in 3 months for follow-up blood work or as needed.    Health Maintenance Topics with due status: Not Due       Topic Last Completion Date    Colorectal Cancer Screening 12/22/2020    Cervical Cancer Screening 08/03/2023    Lipid Panel 07/02/2024    Influenza Vaccine Not Due

## 2024-07-23 DIAGNOSIS — Z12.31 BREAST CANCER SCREENING BY MAMMOGRAM: Primary | ICD-10-CM

## 2024-07-24 ENCOUNTER — OFFICE VISIT (OUTPATIENT)
Dept: DERMATOLOGY | Facility: CLINIC | Age: 64
End: 2024-07-24
Payer: MEDICARE

## 2024-07-24 DIAGNOSIS — L08.9 SKIN INFECTION: ICD-10-CM

## 2024-07-24 DIAGNOSIS — L40.0 PLAQUE PSORIASIS: Primary | ICD-10-CM

## 2024-07-24 DIAGNOSIS — L23.89 ALLERGIC CONTACT DERMATITIS DUE TO OTHER AGENTS: ICD-10-CM

## 2024-07-24 DIAGNOSIS — Z79.899 HIGH RISK MEDICATION USE: ICD-10-CM

## 2024-07-24 PROCEDURE — 87077 CULTURE AEROBIC IDENTIFY: CPT | Mod: ,,, | Performed by: CLINICAL MEDICAL LABORATORY

## 2024-07-24 PROCEDURE — 87070 CULTURE OTHR SPECIMN AEROBIC: CPT | Mod: ,,, | Performed by: CLINICAL MEDICAL LABORATORY

## 2024-07-24 PROCEDURE — 87186 SC STD MICRODIL/AGAR DIL: CPT | Mod: ,,, | Performed by: CLINICAL MEDICAL LABORATORY

## 2024-07-24 RX ORDER — DOXYCYCLINE 100 MG/1
100 CAPSULE ORAL 2 TIMES DAILY
Qty: 28 CAPSULE | Refills: 0 | Status: SHIPPED | OUTPATIENT
Start: 2024-07-24 | End: 2024-08-07

## 2024-07-24 RX ORDER — PREDNISONE 10 MG/1
TABLET ORAL
Qty: 36 TABLET | Refills: 0 | Status: SHIPPED | OUTPATIENT
Start: 2024-07-24

## 2024-07-24 NOTE — PROGRESS NOTES
[facility]   Tatum Perry MD    Patient Name: Janene Castillo  Patient YOB: 1960   Date of Service: 24    CC: Follow-up Psoriasis and Allergic Contact Dermatitis    HPI: Janene Castillo is a 64 y.o. female here today for follow-up of Psoriasis and ACD, last seen .  Previous treatments include Dupixent, Cosentyx, Stelara, Skyrizi, and currently Rinvoq.  Overall, the Psoriasis is stable.  Treatment plan was followed as directed.    Past Medical History:   Diagnosis Date    Allergic contact dermatitis     balsam of peru, bacitracin, fragrance, methyldibromo glutaronite    Anxiety     Chronic pain     HTN (hypertension)     Hyperlipidemia     Plaque psoriasis     Prurigo nodularis     Psoriatic arthritis     Stage 3b chronic kidney disease      Past Surgical History:   Procedure Laterality Date    ANGIOGRAM, CORONARY, WITH LEFT HEART CATHETERIZATION N/A 2022    Procedure: Angiogram, Coronary, with Left Heart Cath;  Surgeon: Jason Ratliff MD;  Location: [facility] CATH LAB;  Service: Cardiology;  Laterality: N/A;     SECTION       Review of patient's allergies indicates:   Allergen Reactions    Bacitracin Dermatitis    Codeine sulfate     Codeine Rash     Rash     Methyldibromoglutaronitrile Dermatitis    Permethrin Rash     Rash     Sulfa (sulfonamide antibiotics) Other (See Comments) and Rash     unknown       Current Outpatient Medications:     amLODIPine (NORVASC) 2.5 MG tablet, Take 1 tablet by mouth once daily, Disp: 90 tablet, Rfl: 0    benazepriL (LOTENSIN) 20 MG tablet, Take 1 tablet by mouth once daily, Disp: 90 tablet, Rfl: 1    buPROPion (WELLBUTRIN) 100 MG tablet, Take 1 tablet by mouth twice daily, Disp: 180 tablet, Rfl: 1    calcipotriene-betamethasone (TACLONEX) ointment, Apply topically to affected area twice daily, tapering with improvement., Disp: 100 g, Rfl: 1    calcium carbonate (OS-CAIN) 600 mg calcium (1,500 mg) Tab, Take 600 mg by mouth once  daily., Disp: , Rfl:     coenzyme Q10 100 mg capsule, as directed Orally, Disp: , Rfl:     doxycycline (VIBRAMYCIN) 100 MG Cap, Take 1 capsule (100 mg total) by mouth 2 (two) times daily. for 14 days, Disp: 28 capsule, Rfl: 0    ezetimibe (ZETIA) 10 mg tablet, TAKE 1 TABLET BY MOUTH ONCE DAILY IN THE EVENING, Disp: 30 tablet, Rfl: 0    famotidine (PEPCID) 20 MG tablet, Take 1 tablet (20 mg total) by mouth 2 (two) times daily., Disp: 180 tablet, Rfl: 1    LINZESS 145 mcg Cap capsule, TAKE 1 CAPSULE BY MOUTH ONCE DAILY BEFORE BREAKFAST, Disp: 30 capsule, Rfl: 0    mupirocin (BACTROBAN) 2 % ointment, Apply to affected areas TID, Disp: 30 g, Rfl: 2    oxyCODONE-acetaminophen (PERCOCET)  mg per tablet, Take 1 tablet by mouth 3 (three) times daily as needed., Disp: , Rfl:     predniSONE (DELTASONE) 10 MG tablet, Take 6 tab po daily x1 day, 4 tab po daily x2 days, 3 tabs po daily x3 days, 2 tabs po daily x4 days, 1 tab po daily x5 days, Disp: 36 tablet, Rfl: 0    RINVOQ 15 mg 24 hr tablet, Take 15 mg by mouth once daily., Disp: , Rfl:     tiZANidine (ZANAFLEX) 4 MG tablet, Take 1 tablet by mouth twice daily, Disp: 180 tablet, Rfl: 1    valACYclovir (VALTREX) 1000 MG tablet, Take 2 tablets b.i.d. x1 day as needed for a cold sore, Disp: 4 tablet, Rfl: 11    varenicline (CHANTIX) 1 mg Tab, Take 1 tablet by mouth twice daily, Disp: 56 tablet, Rfl: 0    XTAMPZA ER 9 mg CSpT, Take 1 capsule by mouth every 12 (twelve) hours., Disp: , Rfl:     zinc gluconate 50 mg tablet, Take 50 mg by mouth once daily., Disp: , Rfl:     Current Facility-Administered Medications:     inclisiran 284 mg/1.5 mL subcutaneous injection 284 mg, 284 mg, Subcutaneous, Q90 Days, Asael Huizar MD    ROS: A focused review of systems was obtained and negative.     Exam: A focused skin exam was performed. All areas examined were normal except as mentioned in the assessment and plan below.  General Appearance of the patient is well developed and  well nourished.  Orientation: alert and oriented x 3.  Mood and affect: pleasant.    Assessment:   The primary encounter diagnosis was Plaque psoriasis. Diagnoses of Allergic contact dermatitis due to other agents, High risk medication use, and Skin infection were also pertinent to this visit.    Plan:   Medications Ordered This Encounter   Medications    doxycycline (VIBRAMYCIN) 100 MG Cap     Sig: Take 1 capsule (100 mg total) by mouth 2 (two) times daily. for 14 days     Dispense:  28 capsule     Refill:  0    predniSONE (DELTASONE) 10 MG tablet     Sig: Take 6 tab po daily x1 day, 4 tab po daily x2 days, 3 tabs po daily x3 days, 2 tabs po daily x4 days, 1 tab po daily x5 days     Dispense:  36 tablet     Refill:  0       Plaque Psoriasis  - clear  Status: Well Controlled    Plan: Counseling  I counseled the patient regarding the following:  Skin care: Emollients, ambient sun exposure, shampoos with tar, selenium or zinc pyrithione can improve psoriasis.  Expectations: Psoriasis is chronic in nature with periods of remissions and flares. Flares can be triggered by stress, infections (group A strep), certain medications and alcohol.  Contact office if: Psoriasis worsens, or fails to improve despite several months of treatment.      Psoriatic Arthritis  - Psoriasiform plaques with micaceous scale  Status: Inadequately controlled      Plan: Counseling.  I counseled the patient regarding the following:  Instructions: Systemic medications are the treatment of choice for psoriatic arthritis. Treatment options include  anti-TNF therapy and methotrexate.  Expectations: Psoriatic arthritis is a type of inflammatory arthritis which occurs in conjunction with psoriasis.  Approximately 5% of psoriasis patients develop psoriatic arthritis. Psoriatic arthritis is chronic in nature with  periods of remissions and flares. Flares can be triggered by stress, infections (group A strep), certain medications  and alcohol. Psoriatic  arthritis requires systemic medication for adequate treatment.  Contact office if: Your psoriatic arthritis worsens, or fails to improve despite several months of treatment.    - pt complaining of neck pain and foot pain    Plan: Discussion of Management with External Provider: Nazia Cárdenas   Discussion Details: Discussed recent increase in joint pain.  She will follow up with pt tomorrow         High Risk Medication Monitoring (Z79.899) : The risks and benefits of the medication were reviewed in full with the patient. Should any side effects occur, the patient will stop the medication and contact me immediately.    - Continue FU with Dr. Huizar to address hyperlipidemia    Rinvoq counseling: I discussed with patient potential side effects including lowered immune system, infections, cardiovascular risk including heart attack and stroke, allergic reactions, blot clots, cancer, and tears in the stomach or intestines.  I discussed lab monitoring with patient.  Patient will call with any side effects including symptoms of infection.         Allergic Contact Dermatitis (L23.89)  - Well demarcated, geometric eczematous patches  Status: Inadequately controlled      Plan: Counseling.  I counseled the patient regarding the following:  Skin care: Patient should use hypoallergenic products such as unscented soaps. Eliminate exposure to all new  cosmetics, fragrances, hair products, nail products shampoos, scented soaps, plants, metals and sunscreens.  Expectations: Allergic contact dermatitis can persist for several weeks before it fully resolves. Sometimes, patch  testing is necessary if reactions persist or if the patient is in contact with several potential allergens.  Contact office if: Allergic contact dermatitis worsens or fails to improve despite several weeks of treatment.    - Pt states she has had a recent flare due to purchasing the wrong laundry detergent  - Will send in prednisone taper for current flare and  instructed to mix mupirocin and TAC and apply daily.  - Reminded of increased risks of potential side effects when taking steroids with Rinvoq      Skin Infection, NOS   - crusting of ACD    Plan: Counseling  I counseled the patient regarding the following:  Skin care: Patients with purulence or fluid collections should have their wounds re-opened, drained, cultured and irrigated. All wound infections should be treated with antibiotics.  Expectations: Wound Infections usually occur 4-7 days postoperatively. Patients exhibit, pain, rednes, swelling, cellulitic changes and fever.  Contact Office if: Wound Infection fails to respond to treatment or worsens, patient develops a fever, or if redness spreads despite antibiotics.    A bacterial culture was obtained from the right arm    - Will treat empirically with doxycycline while waiting for culture results     Follow up in about 3 months (around 10/24/2024) for FU.    Tatum Perry MD

## 2024-07-26 LAB — MICROORGANISM SPEC CULT: ABNORMAL

## 2024-07-29 DIAGNOSIS — L08.9 SKIN INFECTION: ICD-10-CM

## 2024-07-30 DIAGNOSIS — I10 HYPERTENSION, UNSPECIFIED TYPE: ICD-10-CM

## 2024-07-30 DIAGNOSIS — K59.04 CHRONIC IDIOPATHIC CONSTIPATION: ICD-10-CM

## 2024-07-30 DIAGNOSIS — F17.200 SMOKER: ICD-10-CM

## 2024-07-30 DIAGNOSIS — E78.5 HYPERLIPIDEMIA, UNSPECIFIED HYPERLIPIDEMIA TYPE: ICD-10-CM

## 2024-07-30 RX ORDER — VARENICLINE TARTRATE 1 MG/1
1 TABLET, FILM COATED ORAL 2 TIMES DAILY
Qty: 56 TABLET | Refills: 0 | OUTPATIENT
Start: 2024-07-30

## 2024-07-30 RX ORDER — VARENICLINE TARTRATE 1 MG/1
1 TABLET, FILM COATED ORAL 2 TIMES DAILY
Qty: 180 TABLET | Refills: 1 | Status: SHIPPED | OUTPATIENT
Start: 2024-07-30

## 2024-07-30 RX ORDER — AMLODIPINE BESYLATE 2.5 MG/1
2.5 TABLET ORAL
Qty: 90 TABLET | Refills: 1 | Status: SHIPPED | OUTPATIENT
Start: 2024-07-30

## 2024-07-30 RX ORDER — MUPIROCIN 20 MG/G
OINTMENT TOPICAL
Qty: 60 G | Refills: 2 | Status: SHIPPED | OUTPATIENT
Start: 2024-07-30

## 2024-07-30 RX ORDER — EZETIMIBE 10 MG/1
10 TABLET ORAL NIGHTLY
Qty: 90 TABLET | Refills: 1 | Status: SHIPPED | OUTPATIENT
Start: 2024-07-30

## 2024-07-30 RX ORDER — LINACLOTIDE 145 UG/1
145 CAPSULE, GELATIN COATED ORAL
Qty: 30 CAPSULE | Refills: 0 | OUTPATIENT
Start: 2024-07-30

## 2024-07-30 NOTE — TELEPHONE ENCOUNTER
----- Message from Milly Abbott MA sent at 7/30/2024  9:20 AM CDT -----  Please call in amlodipine, chantix, zetia, and linzess to [facility] [address] for pt.

## 2024-07-30 NOTE — TELEPHONE ENCOUNTER
Pt requesting refills on mupirocin, would like 2 tubes so she can mix it with TAC     ----- Message from Radha Newberry sent at 7/30/2024  8:38 AM CDT -----  Pt called for refill. Please call her back at [phone].  
Cheryl Bautista (DO)  [place] [address]  Phone: [phone]  Fax: [phone]  Follow Up Time: 1-3 days

## 2024-07-31 ENCOUNTER — EXTERNAL CHRONIC CARE MANAGEMENT (OUTPATIENT)
Dept: FAMILY MEDICINE | Facility: CLINIC | Age: 64
End: 2024-07-31
Payer: MEDICARE

## 2024-07-31 PROCEDURE — G0511 CCM/BHI BY RHC/FQHC 20MIN MO: HCPCS | Mod: ,,, | Performed by: FAMILY MEDICINE

## 2024-08-05 ENCOUNTER — TELEPHONE (OUTPATIENT)
Dept: DERMATOLOGY | Facility: CLINIC | Age: 64
End: 2024-08-05
Payer: MEDICARE

## 2024-08-06 ENCOUNTER — TELEPHONE (OUTPATIENT)
Dept: DERMATOLOGY | Facility: CLINIC | Age: 64
End: 2024-08-06
Payer: MEDICARE

## 2024-08-07 ENCOUNTER — INFUSION (OUTPATIENT)
Dept: INFUSION THERAPY | Facility: HOSPITAL | Age: 64
End: 2024-08-07
Attending: FAMILY MEDICINE
Payer: MEDICARE

## 2024-08-07 ENCOUNTER — OFFICE VISIT (OUTPATIENT)
Dept: DERMATOLOGY | Facility: CLINIC | Age: 64
End: 2024-08-07
Payer: MEDICARE

## 2024-08-07 VITALS
RESPIRATION RATE: 17 BRPM | OXYGEN SATURATION: 95 % | HEART RATE: 66 BPM | DIASTOLIC BLOOD PRESSURE: 72 MMHG | SYSTOLIC BLOOD PRESSURE: 126 MMHG

## 2024-08-07 DIAGNOSIS — L23.89 ALLERGIC CONTACT DERMATITIS DUE TO OTHER AGENTS: ICD-10-CM

## 2024-08-07 DIAGNOSIS — R94.30 ABNORMAL RESULTS OF CARDIOVASCULAR FUNCTION STUDIES: Primary | ICD-10-CM

## 2024-08-07 DIAGNOSIS — Z78.9 STATIN INTOLERANCE: ICD-10-CM

## 2024-08-07 DIAGNOSIS — L40.0 PLAQUE PSORIASIS: ICD-10-CM

## 2024-08-07 DIAGNOSIS — L08.9 SKIN INFECTION: Primary | ICD-10-CM

## 2024-08-07 DIAGNOSIS — E78.5 HYPERLIPIDEMIA, UNSPECIFIED HYPERLIPIDEMIA TYPE: ICD-10-CM

## 2024-08-07 DIAGNOSIS — Z79.899 HIGH RISK MEDICATION USE: ICD-10-CM

## 2024-08-07 PROCEDURE — 99214 OFFICE O/P EST MOD 30 MIN: CPT | Mod: ,,, | Performed by: DERMATOLOGY

## 2024-08-07 PROCEDURE — 96372 THER/PROPH/DIAG INJ SC/IM: CPT

## 2024-08-07 PROCEDURE — 63600175 PHARM REV CODE 636 W HCPCS: Mod: JZ,TB,UD | Performed by: FAMILY MEDICINE

## 2024-08-07 PROCEDURE — 1160F RVW MEDS BY RX/DR IN RCRD: CPT | Mod: CPTII,,, | Performed by: DERMATOLOGY

## 2024-08-07 PROCEDURE — 4010F ACE/ARB THERAPY RXD/TAKEN: CPT | Mod: CPTII,,, | Performed by: DERMATOLOGY

## 2024-08-07 PROCEDURE — 1159F MED LIST DOCD IN RCRD: CPT | Mod: CPTII,,, | Performed by: DERMATOLOGY

## 2024-08-07 PROCEDURE — 87070 CULTURE OTHR SPECIMN AEROBIC: CPT | Mod: ,,, | Performed by: CLINICAL MEDICAL LABORATORY

## 2024-08-07 RX ORDER — ACETAMINOPHEN 325 MG/1
650 TABLET ORAL
OUTPATIENT
Start: 2024-09-01

## 2024-08-07 RX ORDER — EPINEPHRINE 0.3 MG/.3ML
0.3 INJECTION SUBCUTANEOUS ONCE AS NEEDED
Status: DISCONTINUED | OUTPATIENT
Start: 2024-08-07 | End: 2024-08-07 | Stop reason: HOSPADM

## 2024-08-07 RX ORDER — KETOROLAC TROMETHAMINE 30 MG/ML
15 INJECTION, SOLUTION INTRAMUSCULAR; INTRAVENOUS ONCE AS NEEDED
Status: DISCONTINUED | OUTPATIENT
Start: 2024-08-07 | End: 2024-08-07 | Stop reason: HOSPADM

## 2024-08-07 RX ORDER — DIPHENHYDRAMINE HYDROCHLORIDE 50 MG/ML
50 INJECTION INTRAMUSCULAR; INTRAVENOUS ONCE AS NEEDED
OUTPATIENT
Start: 2024-09-01

## 2024-08-07 RX ORDER — ONDANSETRON HYDROCHLORIDE 2 MG/ML
8 INJECTION, SOLUTION INTRAVENOUS ONCE AS NEEDED
OUTPATIENT
Start: 2024-09-01

## 2024-08-07 RX ORDER — DOXYCYCLINE 100 MG/1
100 CAPSULE ORAL 2 TIMES DAILY
Qty: 28 CAPSULE | Refills: 0 | Status: SHIPPED | OUTPATIENT
Start: 2024-08-07 | End: 2024-08-21

## 2024-08-07 RX ORDER — METHYLPREDNISOLONE SOD SUCC 125 MG
125 VIAL (EA) INJECTION
OUTPATIENT
Start: 2024-09-01

## 2024-08-07 RX ORDER — ACETAMINOPHEN 325 MG/1
650 TABLET ORAL
Status: DISCONTINUED | OUTPATIENT
Start: 2024-08-07 | End: 2024-08-07 | Stop reason: HOSPADM

## 2024-08-07 RX ORDER — METHYLPREDNISOLONE SOD SUCC 125 MG
125 VIAL (EA) INJECTION
Status: DISCONTINUED | OUTPATIENT
Start: 2024-08-07 | End: 2024-08-07 | Stop reason: HOSPADM

## 2024-08-07 RX ORDER — METHYLPREDNISOLONE SOD SUCC 125 MG
80 VIAL (EA) INJECTION ONCE
OUTPATIENT
Start: 2024-09-01

## 2024-08-07 RX ORDER — DIPHENHYDRAMINE HYDROCHLORIDE 50 MG/ML
50 INJECTION INTRAMUSCULAR; INTRAVENOUS ONCE AS NEEDED
Status: DISCONTINUED | OUTPATIENT
Start: 2024-08-07 | End: 2024-08-07 | Stop reason: HOSPADM

## 2024-08-07 RX ORDER — METHYLPREDNISOLONE SOD SUCC 125 MG
80 VIAL (EA) INJECTION ONCE
Status: DISCONTINUED | OUTPATIENT
Start: 2024-08-07 | End: 2024-08-07 | Stop reason: HOSPADM

## 2024-08-07 RX ORDER — EPINEPHRINE 0.3 MG/.3ML
0.3 INJECTION SUBCUTANEOUS ONCE AS NEEDED
OUTPATIENT
Start: 2024-09-01

## 2024-08-07 RX ORDER — ONDANSETRON HYDROCHLORIDE 2 MG/ML
8 INJECTION, SOLUTION INTRAVENOUS ONCE AS NEEDED
Status: DISCONTINUED | OUTPATIENT
Start: 2024-08-07 | End: 2024-08-07 | Stop reason: HOSPADM

## 2024-08-07 RX ORDER — KETOROLAC TROMETHAMINE 30 MG/ML
15 INJECTION, SOLUTION INTRAMUSCULAR; INTRAVENOUS ONCE AS NEEDED
OUTPATIENT
Start: 2024-09-01

## 2024-08-07 RX ADMIN — INCLISIRAN 284 MG: 284 INJECTION, SOLUTION SUBCUTANEOUS at 09:08

## 2024-08-09 LAB — MICROORGANISM SPEC CULT: NORMAL

## 2024-08-19 ENCOUNTER — TELEPHONE (OUTPATIENT)
Dept: DERMATOLOGY | Facility: CLINIC | Age: 64
End: 2024-08-19
Payer: MEDICARE

## 2024-08-19 ENCOUNTER — OFFICE VISIT (OUTPATIENT)
Dept: DERMATOLOGY | Facility: CLINIC | Age: 64
End: 2024-08-19
Payer: MEDICARE

## 2024-08-19 VITALS — HEIGHT: 67 IN | BODY MASS INDEX: 30.01 KG/M2 | WEIGHT: 191.19 LBS

## 2024-08-19 DIAGNOSIS — L08.9 SKIN INFECTION: Primary | ICD-10-CM

## 2024-08-19 DIAGNOSIS — L30.9 DERMATITIS, UNSPECIFIED: ICD-10-CM

## 2024-08-19 DIAGNOSIS — L23.89 ALLERGIC CONTACT DERMATITIS DUE TO OTHER AGENTS: ICD-10-CM

## 2024-08-19 PROCEDURE — 3008F BODY MASS INDEX DOCD: CPT | Mod: CPTII,,, | Performed by: DERMATOLOGY

## 2024-08-19 PROCEDURE — 4010F ACE/ARB THERAPY RXD/TAKEN: CPT | Mod: CPTII,,, | Performed by: DERMATOLOGY

## 2024-08-19 PROCEDURE — 1160F RVW MEDS BY RX/DR IN RCRD: CPT | Mod: CPTII,,, | Performed by: DERMATOLOGY

## 2024-08-19 PROCEDURE — 99214 OFFICE O/P EST MOD 30 MIN: CPT | Mod: ,,, | Performed by: DERMATOLOGY

## 2024-08-19 PROCEDURE — 87070 CULTURE OTHR SPECIMN AEROBIC: CPT | Mod: ,,, | Performed by: CLINICAL MEDICAL LABORATORY

## 2024-08-19 PROCEDURE — 1159F MED LIST DOCD IN RCRD: CPT | Mod: CPTII,,, | Performed by: DERMATOLOGY

## 2024-08-19 RX ORDER — IVERMECTIN 3 MG/1
TABLET ORAL
Qty: 12 TABLET | Refills: 0 | Status: SHIPPED | OUTPATIENT
Start: 2024-08-19

## 2024-08-19 NOTE — PROGRESS NOTES
[facility]   Tatum Perry MD    Patient Name: Janene Castillo  Patient YOB: 1960   Date of Service: 24    CC: Follow-up Dermatitis    HPI: Janene Castillo is a 64 y.o. female here today for follow-up of dermatitis, last seen 24.  Previous treatments include doxycyline and Rinvoq.  Overall, the psoriasis is worse.  Treatment plan was followed as directed.    Past Medical History:   Diagnosis Date    Allergic contact dermatitis     balsam of peru, bacitracin, fragrance, methyldibromo glutaronite    Anxiety     Chronic pain     HTN (hypertension)     Hyperlipidemia     Plaque psoriasis     Prurigo nodularis     Psoriatic arthritis     Stage 3b chronic kidney disease      Past Surgical History:   Procedure Laterality Date    ANGIOGRAM, CORONARY, WITH LEFT HEART CATHETERIZATION N/A 2022    Procedure: Angiogram, Coronary, with Left Heart Cath;  Surgeon: Jason Ratliff MD;  Location: [facility] CATH LAB;  Service: Cardiology;  Laterality: N/A;     SECTION       Review of patient's allergies indicates:   Allergen Reactions    Bacitracin Dermatitis    Codeine sulfate     Codeine Rash     Rash     Methyldibromoglutaronitrile Dermatitis    Permethrin Rash     Rash     Sulfa (sulfonamide antibiotics) Other (See Comments) and Rash     unknown       Current Outpatient Medications:     amLODIPine (NORVASC) 2.5 MG tablet, Take 1 tablet by mouth once daily, Disp: 90 tablet, Rfl: 1    benazepriL (LOTENSIN) 20 MG tablet, Take 1 tablet by mouth once daily, Disp: 90 tablet, Rfl: 1    buPROPion (WELLBUTRIN) 100 MG tablet, Take 1 tablet by mouth twice daily, Disp: 180 tablet, Rfl: 1    calcipotriene-betamethasone (TACLONEX) ointment, Apply topically to affected area twice daily, tapering with improvement., Disp: 100 g, Rfl: 1    calcium carbonate (OS-CAIN) 600 mg calcium (1,500 mg) Tab, Take 600 mg by mouth once daily., Disp: , Rfl:     coenzyme Q10 100 mg capsule, as directed Orally, Disp: ,  Rfl:     doxycycline (VIBRAMYCIN) 100 MG Cap, Take 1 capsule (100 mg total) by mouth 2 (two) times daily. for 14 days, Disp: 28 capsule, Rfl: 0    ezetimibe (ZETIA) 10 mg tablet, Take 1 tablet (10 mg total) by mouth every evening., Disp: 90 tablet, Rfl: 1    famotidine (PEPCID) 20 MG tablet, Take 1 tablet (20 mg total) by mouth 2 (two) times daily., Disp: 180 tablet, Rfl: 1    ivermectin (STROMECTOL) 3 mg Tab, Take 6 tablets today, then remaining 6 tablets in 7 days, Disp: 12 tablet, Rfl: 0    linaCLOtide (LINZESS) 145 mcg Cap capsule, Take 1 capsule (145 mcg total) by mouth before breakfast., Disp: 90 capsule, Rfl: 1    mupirocin (BACTROBAN) 2 % ointment, Apply to affected areas TID, Disp: 60 g, Rfl: 2    oxyCODONE-acetaminophen (PERCOCET)  mg per tablet, Take 1 tablet by mouth 3 (three) times daily as needed., Disp: , Rfl:     predniSONE (DELTASONE) 10 MG tablet, Take 6 tab po daily x1 day, 4 tab po daily x2 days, 3 tabs po daily x3 days, 2 tabs po daily x4 days, 1 tab po daily x5 days, Disp: 36 tablet, Rfl: 0    RINVOQ 15 mg 24 hr tablet, Take 15 mg by mouth once daily., Disp: , Rfl:     tiZANidine (ZANAFLEX) 4 MG tablet, Take 1 tablet by mouth twice daily, Disp: 180 tablet, Rfl: 1    valACYclovir (VALTREX) 1000 MG tablet, Take 2 tablets b.i.d. x1 day as needed for a cold sore, Disp: 4 tablet, Rfl: 11    varenicline (CHANTIX) 1 mg Tab, Take 1 tablet (1 mg total) by mouth 2 (two) times daily., Disp: 180 tablet, Rfl: 1    XTAMPZA ER 9 mg CSpT, Take 1 capsule by mouth every 12 (twelve) hours., Disp: , Rfl:     zinc gluconate 50 mg tablet, Take 50 mg by mouth once daily., Disp: , Rfl:     Current Facility-Administered Medications:     inclisiran 284 mg/1.5 mL subcutaneous injection 284 mg, 284 mg, Subcutaneous, Q90 Days, Asael Huizar MD    ROS: A focused review of systems was obtained and negative.     Exam: A focused skin exam was performed. All areas examined were normal except as mentioned in the  assessment and plan below.  General Appearance of the patient is well developed and well nourished.  Orientation: alert and oriented x 3.  Mood and affect: pleasant.    Assessment:   The primary encounter diagnosis was Skin infection. Diagnoses of Dermatitis, unspecified and Allergic contact dermatitis due to other agents were also pertinent to this visit.    Plan:   Medications Ordered This Encounter   Medications    ivermectin (STROMECTOL) 3 mg Tab     Sig: Take 6 tablets today, then remaining 6 tablets in 7 days     Dispense:  12 tablet     Refill:  0     Order Specific Question:   Current clinical data does NOT support the use of Ivermectin for the treatment or prevention of COVID-19, and as such, should NOT be used outside of a clinical trial for this purpose. Click Yes to acknowledge.     Answer:   Yes       Dermatitis Unspecified  - excoriated, urticarial papules some of which have blanching   DDx: ACD vs scabies vs bite    Plan: Counseling  I counseled the patient regarding the following:  Skin care: Patient instructed to use gentle skin care including dove unscented soap, CeraVe moisturizing cream, and fragrance free laundry detergent.  Expectations: The patient understands that there is not a definitive diagnosis at this time. Further testing or empiric therapy may be necessary to diagnose and improve the condition.  Contact office if: The patient develops a fever, or rash dramatically worsens despite treatment.    - will treat empirically with ivermectin    - will consider biopsy in the future if it still persists   - continue to follow CAMP list including aerosolized fragrance     Allergic Contact Dermatitis (L23.89)  - Well demarcated, geometric eczematous patches  Status: Inadequately controlled      Plan: Counseling.  I counseled the patient regarding the following:  Skin care: Patient should use hypoallergenic products such as unscented soaps. Eliminate exposure to all new  cosmetics, fragrances,  hair products, nail products shampoos, scented soaps, plants, metals and sunscreens.  Expectations: Allergic contact dermatitis can persist for several weeks before it fully resolves. Sometimes, patch  testing is necessary if reactions persist or if the patient is in contact with several potential allergens.  Contact office if: Allergic contact dermatitis worsens or fails to improve despite several weeks of treatment.    - continue to follow camp list     Skin Infection, NOS   - crusting of above location     Plan: Counseling  I counseled the patient regarding the following:  Skin care: Patients with purulence or fluid collections should have their wounds re-opened, drained, cultured and irrigated. All wound infections should be treated with antibiotics.  Expectations: Wound Infections usually occur 4-7 days postoperatively. Patients exhibit, pain, rednes, swelling, cellulitic changes and fever.  Contact Office if: Wound Infection fails to respond to treatment or worsens, patient develops a fever, or if redness spreads despite antibiotics.    A bacterial culture was obtained from the left arm      Follow up if symptoms worsen or fail to improve.    Tatum Perry MD

## 2024-08-19 NOTE — TELEPHONE ENCOUNTER
Call back to pt. Pt scheduled for 13:00 today.     ----- Message from Eliana Ambriz sent at 8/19/2024  8:37 AM CDT -----  PATIENT CALL AND WOULD LIKE A CALL BACK FROM BETTY TO GIVE HER A CALL BACK [phone]

## 2024-08-21 LAB — MICROORGANISM SPEC CULT: NORMAL

## 2024-08-26 ENCOUNTER — TELEPHONE (OUTPATIENT)
Dept: DERMATOLOGY | Facility: CLINIC | Age: 64
End: 2024-08-26
Payer: MEDICARE

## 2024-08-26 NOTE — TELEPHONE ENCOUNTER
Call back to [employer] insurance. Diagnoses code given at this time.     ----- Message from Radha Newberry sent at 8/26/2024 10:42 AM CDT -----  Wil[place] [phone]  [facility] Ins.  Has questions regarding pts Rx forivermectin (STROMECTOL) 3 mg Tab

## 2024-08-31 ENCOUNTER — EXTERNAL CHRONIC CARE MANAGEMENT (OUTPATIENT)
Dept: FAMILY MEDICINE | Facility: CLINIC | Age: 64
End: 2024-08-31
Payer: MEDICARE

## 2024-08-31 PROCEDURE — G0511 CCM/BHI BY RHC/FQHC 20MIN MO: HCPCS | Mod: ,,, | Performed by: FAMILY MEDICINE

## 2024-09-03 ENCOUNTER — HOSPITAL ENCOUNTER (OUTPATIENT)
Dept: RADIOLOGY | Facility: HOSPITAL | Age: 64
Discharge: HOME | End: 2024-09-03
Attending: OBSTETRICS & GYNECOLOGY
Payer: MEDICARE

## 2024-09-03 DIAGNOSIS — Z12.31 BREAST CANCER SCREENING BY MAMMOGRAM: ICD-10-CM

## 2024-09-13 DIAGNOSIS — K21.9 GASTROESOPHAGEAL REFLUX DISEASE, UNSPECIFIED WHETHER ESOPHAGITIS PRESENT: ICD-10-CM

## 2024-09-13 RX ORDER — FAMOTIDINE 20 MG/1
20 TABLET, FILM COATED ORAL 2 TIMES DAILY
Qty: 180 TABLET | Refills: 0 | Status: SHIPPED | OUTPATIENT
Start: 2024-09-13

## 2024-09-26 ENCOUNTER — OFFICE VISIT (OUTPATIENT)
Dept: FAMILY MEDICINE | Facility: CLINIC | Age: 64
End: 2024-09-26
Payer: MEDICARE

## 2024-09-26 VITALS
RESPIRATION RATE: 18 BRPM | DIASTOLIC BLOOD PRESSURE: 86 MMHG | BODY MASS INDEX: 30.76 KG/M2 | HEIGHT: 67 IN | TEMPERATURE: 98 F | HEART RATE: 74 BPM | OXYGEN SATURATION: 99 % | SYSTOLIC BLOOD PRESSURE: 138 MMHG | WEIGHT: 196 LBS

## 2024-09-26 DIAGNOSIS — F32.1 MAJOR DEPRESSIVE DISORDER, SINGLE EPISODE, MODERATE: Primary | ICD-10-CM

## 2024-09-26 PROCEDURE — 99212 OFFICE O/P EST SF 10 MIN: CPT | Mod: ,,, | Performed by: FAMILY MEDICINE

## 2024-09-26 PROCEDURE — 1159F MED LIST DOCD IN RCRD: CPT | Mod: ,,, | Performed by: FAMILY MEDICINE

## 2024-09-26 PROCEDURE — 3079F DIAST BP 80-89 MM HG: CPT | Mod: ,,, | Performed by: FAMILY MEDICINE

## 2024-09-26 PROCEDURE — 4010F ACE/ARB THERAPY RXD/TAKEN: CPT | Mod: ,,, | Performed by: FAMILY MEDICINE

## 2024-09-26 PROCEDURE — 3075F SYST BP GE 130 - 139MM HG: CPT | Mod: ,,, | Performed by: FAMILY MEDICINE

## 2024-09-26 PROCEDURE — 3008F BODY MASS INDEX DOCD: CPT | Mod: ,,, | Performed by: FAMILY MEDICINE

## 2024-09-26 PROCEDURE — 1160F RVW MEDS BY RX/DR IN RCRD: CPT | Mod: ,,, | Performed by: FAMILY MEDICINE

## 2024-09-26 NOTE — PROGRESS NOTES
Janene Castillo is a 64 y.o. female seen today for follow-up on hyperlipidemia initially but I prefer to wait at least 2 more months before he rechecking her lipid panel.  She complains of diffuse persistent pruritus often leading to excoriated papules.  She reports to Dermatology evaluations recently but was not satisfied with the results.  Patient has been seen by Rheumatology and an allergy evaluation has been ordered.  I recommended to the patient that she discontinue 1 medicine at a time for proximally 3 days to see if this improves her symptoms and let us know.  Patient did scored 13 on her PHQ-9 and does have a history of depression but reports her symptoms are stable and that she defers any further treatment at this time.    Past Medical History:   Diagnosis Date    Allergic contact dermatitis     balsam of peru, bacitracin, fragrance, methyldibromo glutaronite    Anxiety     Chronic pain     HTN (hypertension)     Hyperlipidemia     Plaque psoriasis     Prurigo nodularis     Psoriatic arthritis     Stage 3b chronic kidney disease      Family History   Problem Relation Name Age of Onset    Hypertension Mother      Heart disease Father      Heart attack Father      Heart failure Father      Pacemaker/defibrilator Father      Melanoma Neg Hx       Current Outpatient Medications on File Prior to Visit   Medication Sig Dispense Refill    amLODIPine (NORVASC) 2.5 MG tablet Take 1 tablet by mouth once daily 90 tablet 1    benazepriL (LOTENSIN) 20 MG tablet Take 1 tablet by mouth once daily 90 tablet 1    buPROPion (WELLBUTRIN) 100 MG tablet Take 1 tablet by mouth twice daily 180 tablet 1    calcipotriene-betamethasone (TACLONEX) ointment Apply topically to affected area twice daily, tapering with improvement. 100 g 1    calcium carbonate (OS-CAIN) 600 mg calcium (1,500 mg) Tab Take 600 mg by mouth once daily.      coenzyme Q10 100 mg capsule as directed Orally      ezetimibe (ZETIA) 10 mg tablet Take 1 tablet (10 mg  total) by mouth every evening. 90 tablet 1    famotidine (PEPCID) 20 MG tablet Take 1 tablet by mouth twice daily 180 tablet 0    linaCLOtide (LINZESS) 145 mcg Cap capsule Take 1 capsule (145 mcg total) by mouth before breakfast. 90 capsule 1    mupirocin (BACTROBAN) 2 % ointment Apply to affected areas TID 60 g 2    oxyCODONE-acetaminophen (PERCOCET)  mg per tablet Take 1 tablet by mouth 3 (three) times daily as needed.      RINVOQ 15 mg 24 hr tablet Take 15 mg by mouth once daily.      tiZANidine (ZANAFLEX) 4 MG tablet Take 1 tablet by mouth twice daily 180 tablet 1    valACYclovir (VALTREX) 1000 MG tablet Take 2 tablets b.i.d. x1 day as needed for a cold sore 4 tablet 11    varenicline (CHANTIX) 1 mg Tab Take 1 tablet (1 mg total) by mouth 2 (two) times daily. 180 tablet 1    zinc gluconate 50 mg tablet Take 50 mg by mouth once daily.      predniSONE (DELTASONE) 10 MG tablet Take 6 tab po daily x1 day, 4 tab po daily x2 days, 3 tabs po daily x3 days, 2 tabs po daily x4 days, 1 tab po daily x5 days (Patient not taking: Reported on 9/26/2024) 36 tablet 0    [DISCONTINUED] ivermectin (STROMECTOL) 3 mg Tab Take 6 tablets today, then remaining 6 tablets in 7 days (Patient not taking: Reported on 9/26/2024) 12 tablet 0    [DISCONTINUED] XTAMPZA ER 9 mg CSpT Take 1 capsule by mouth every 12 (twelve) hours. (Patient not taking: Reported on 9/26/2024)       Current Facility-Administered Medications on File Prior to Visit   Medication Dose Route Frequency Provider Last Rate Last Admin    inclisiran 284 mg/1.5 mL subcutaneous injection 284 mg  284 mg Subcutaneous Q90 Days Asael Huizar MD         Immunization History   Administered Date(s) Administered    COVID-19 MRNA, LN-S PF (MODERNA HALF 0.25 ML DOSE) 04/26/2022    COVID-19, MRNA, LN-S, PF (MODERNA FULL 0.5 ML DOSE) 06/22/2021, 08/06/2021    COVID-19, mRNA, LNP-S, bivalent booster, PF (Moderna Omicron)12 + YEARS 11/16/2022       Review of Systems    Constitutional:  Negative for fever, malaise/fatigue and weight loss.   Respiratory:  Negative for shortness of breath.    Cardiovascular:  Negative for chest pain and palpitations.   Gastrointestinal:  Negative for nausea and vomiting.   Skin:  Positive for itching and rash.   Psychiatric/Behavioral:  Positive for depression. The patient has insomnia.         Vitals:    09/26/24 1457   BP: 138/86   Pulse: 74   Resp: 18   Temp: 97.9 °F (36.6 °C)       Physical Exam  Vitals reviewed.   Eyes:      Conjunctiva/sclera: Conjunctivae normal.   Pulmonary:      Effort: Pulmonary effort is normal.   Skin:     Findings: Erythema and rash present. Rash is papular.      Comments: Patient has diffuse excoriated papular rash over her upper arms bilaterally   Neurological:      Mental Status: She is alert.   Psychiatric:         Mood and Affect: Mood normal.         Behavior: Behavior normal.         Thought Content: Thought content normal.         Judgment: Judgment normal.          Assessment and Plan  1. Major depressive disorder, single episode, moderate  Assessment & Plan:  The patient will continue her Wellbutrin for now but defers for the medications.               Return to clinic in 1 month or as needed.  For sleep I recommended a trial of melatonin with magnesium over-the-counter.  She has tried Vistaril in the past which was not helpful.    Health Maintenance Topics with due status: Not Due       Topic Last Completion Date    Colorectal Cancer Screening 12/22/2020    Cervical Cancer Screening 08/03/2023    Lipid Panel 07/02/2024

## 2024-09-30 ENCOUNTER — EXTERNAL CHRONIC CARE MANAGEMENT (OUTPATIENT)
Dept: FAMILY MEDICINE | Facility: CLINIC | Age: 64
End: 2024-09-30
Payer: MEDICARE

## 2024-09-30 PROCEDURE — G0511 CCM/BHI BY RHC/FQHC 20MIN MO: HCPCS | Mod: ,,, | Performed by: FAMILY MEDICINE

## 2024-09-30 NOTE — PROGRESS NOTES
"  Janene Castillo presented for a  Medicare AWV and comprehensive Health Risk Assessment today. The following components were reviewed and updated:    Medical history  Family History  Social history  Allergies and Current Medications  Health Risk Assessment  Health Maintenance  Care Team         See Completed Assessments for Annual Wellness Visit within the encounter summary.  Declined influenza today visit.       The following assessments were completed:  Living Situation  CAGE  Depression Screening  Timed Get Up and Go  Whisper Test  Cognitive Function Screening  Nutrition Screening  ADL Screening  PAQ Screening        Opioid Documentation    does not have a current opioid prescription.       Vitals:    10/01/24 1334   BP: 138/88   Pulse: 69   Resp: 14   Temp: 98.7 °F (37.1 °C)   SpO2: 97%   Weight: 88.6 kg (195 lb 6.4 oz)   Height: 5' 7" (1.702 m)     Body mass index is 30.6 kg/m².  Physical Exam  Vitals and nursing note reviewed.   Constitutional:       Appearance: Normal appearance. She is well-developed.   HENT:      Head: Normocephalic.      Right Ear: Hearing normal.      Left Ear: Hearing normal.      Nose: Nose normal.   Eyes:      General: Lids are normal.      Conjunctiva/sclera: Conjunctivae normal.   Cardiovascular:      Rate and Rhythm: Normal rate and regular rhythm.      Heart sounds: Normal heart sounds.   Pulmonary:      Effort: Pulmonary effort is normal.      Breath sounds: Normal breath sounds.   Musculoskeletal:         General: Normal range of motion.      Cervical back: Normal range of motion and neck supple.      Right lower leg: No edema.      Left lower leg: No edema.   Skin:     General: Skin is warm and dry.   Neurological:      Mental Status: She is alert and oriented to person, place, and time.      Gait: Gait is intact.   Psychiatric:         Behavior: Behavior is cooperative.               Diagnoses and health risks identified today and associated recommendations/orders:    Problem " List Items Addressed This Visit          Cardiac/Vascular    Hypertension    Relevant Orders    Microalbumin/Creatinine Ratio, Urine (Completed)    Hyperlipidemia       Renal/    Stage 3b chronic kidney disease       GI    Gastroesophageal reflux disease     Other Visit Diagnoses       Encounter for initial annual wellness visit (AWV) in Medicare patient    -  Primary    Depression, unspecified depression type        Smoker        Degenerative disc disease, cervical        Encounter for hepatitis C screening test for low risk patient        Relevant Orders    Hepatitis C Antibody (Completed)    Acute lower respiratory infection        Herpes labialis        Relevant Medications    valACYclovir (VALTREX) 1000 MG tablet    BMI 30.0-30.9,adult                Provided Janene with a 5-10 year written screening schedule and personal prevention plan. Recommendations were developed using the USPSTF age appropriate recommendations. Education, counseling, and referrals were provided as needed. After Visit Summary printed and given to patient which includes a list of additional screenings\tests needed.    No follow-ups on file.    Mariah Ellis, JANET     I offered to discuss advanced care planning, including how to pick a person who would make decisions for you if you were unable to make them for yourself, called a health care power of , and what kind of decisions you might make such as use of life sustaining treatments such as ventilators and tube feeding when faced with a life limiting illness recorded on a living will that they will need to know. (How you want to be cared for as you near the end of your natural life)     X Patient is interested in learning more about how to make advanced directives.  I provided them paperwork and offered to discuss this with them.

## 2024-10-01 ENCOUNTER — OFFICE VISIT (OUTPATIENT)
Dept: FAMILY MEDICINE | Facility: CLINIC | Age: 64
End: 2024-10-01
Payer: MEDICARE

## 2024-10-01 VITALS
BODY MASS INDEX: 30.66 KG/M2 | SYSTOLIC BLOOD PRESSURE: 138 MMHG | WEIGHT: 195.38 LBS | RESPIRATION RATE: 14 BRPM | OXYGEN SATURATION: 97 % | TEMPERATURE: 99 F | HEART RATE: 69 BPM | HEIGHT: 67 IN | DIASTOLIC BLOOD PRESSURE: 88 MMHG

## 2024-10-01 DIAGNOSIS — K21.9 GASTROESOPHAGEAL REFLUX DISEASE, UNSPECIFIED WHETHER ESOPHAGITIS PRESENT: ICD-10-CM

## 2024-10-01 DIAGNOSIS — Z11.59 ENCOUNTER FOR HEPATITIS C SCREENING TEST FOR LOW RISK PATIENT: ICD-10-CM

## 2024-10-01 DIAGNOSIS — Z00.00 ENCOUNTER FOR INITIAL ANNUAL WELLNESS VISIT (AWV) IN MEDICARE PATIENT: Primary | ICD-10-CM

## 2024-10-01 DIAGNOSIS — F17.200 SMOKER: ICD-10-CM

## 2024-10-01 DIAGNOSIS — I10 HYPERTENSION, UNSPECIFIED TYPE: ICD-10-CM

## 2024-10-01 DIAGNOSIS — N18.32 STAGE 3B CHRONIC KIDNEY DISEASE: ICD-10-CM

## 2024-10-01 DIAGNOSIS — F32.A DEPRESSION, UNSPECIFIED DEPRESSION TYPE: ICD-10-CM

## 2024-10-01 DIAGNOSIS — B00.1 HERPES LABIALIS: ICD-10-CM

## 2024-10-01 DIAGNOSIS — M50.30 DEGENERATIVE DISC DISEASE, CERVICAL: ICD-10-CM

## 2024-10-01 DIAGNOSIS — J22 ACUTE LOWER RESPIRATORY INFECTION: ICD-10-CM

## 2024-10-01 DIAGNOSIS — E78.5 HYPERLIPIDEMIA, UNSPECIFIED HYPERLIPIDEMIA TYPE: ICD-10-CM

## 2024-10-01 LAB
CREAT UR-MCNC: 30 MG/DL (ref 28–219)
HCV AB SER QL: NORMAL
MICROALBUMIN UR-MCNC: <0.5 MG/DL (ref 0–2.8)
MICROALBUMIN/CREAT RATIO PNL UR: <16.7 MG/G (ref 0–30)

## 2024-10-01 PROCEDURE — 82043 UR ALBUMIN QUANTITATIVE: CPT | Mod: ,,, | Performed by: CLINICAL MEDICAL LABORATORY

## 2024-10-01 PROCEDURE — 1159F MED LIST DOCD IN RCRD: CPT | Mod: ,,, | Performed by: NURSE PRACTITIONER

## 2024-10-01 PROCEDURE — 82570 ASSAY OF URINE CREATININE: CPT | Mod: ,,, | Performed by: CLINICAL MEDICAL LABORATORY

## 2024-10-01 PROCEDURE — G0438 PPPS, INITIAL VISIT: HCPCS | Mod: ,,, | Performed by: NURSE PRACTITIONER

## 2024-10-01 PROCEDURE — 3061F NEG MICROALBUMINURIA REV: CPT | Mod: ,,, | Performed by: NURSE PRACTITIONER

## 2024-10-01 PROCEDURE — 86803 HEPATITIS C AB TEST: CPT | Mod: ,,, | Performed by: CLINICAL MEDICAL LABORATORY

## 2024-10-01 PROCEDURE — 3075F SYST BP GE 130 - 139MM HG: CPT | Mod: ,,, | Performed by: NURSE PRACTITIONER

## 2024-10-01 PROCEDURE — 4010F ACE/ARB THERAPY RXD/TAKEN: CPT | Mod: ,,, | Performed by: NURSE PRACTITIONER

## 2024-10-01 PROCEDURE — 3066F NEPHROPATHY DOC TX: CPT | Mod: ,,, | Performed by: NURSE PRACTITIONER

## 2024-10-01 PROCEDURE — 3079F DIAST BP 80-89 MM HG: CPT | Mod: ,,, | Performed by: NURSE PRACTITIONER

## 2024-10-01 NOTE — PATIENT INSTRUCTIONS
Counseling and Referral of Other Preventative  (Italic type indicates deductible and co-insurance are waived)    Patient Name: Janene Castillo  Today's Date: 10/1/2024    Health Maintenance       Date Due Completion Date    Hepatitis C Screening Never done ---    Annual UACr Never done ---    HIV Screening Never done ---    TETANUS VACCINE Never done ---    Hemoglobin A1c (Diabetic Prevention Screening) Never done ---    Shingles Vaccine (1 of 2) Never done ---    RSV Vaccine (Age 60+ and Pregnant patients) (1 - Risk 60-74 years 1-dose series) Never done ---    Mammogram 08/28/2024 8/28/2023    Influenza Vaccine (1) Never done ---    COVID-19 Vaccine (5 - 2024-25 season) 09/01/2024 11/16/2022    Cervical Cancer Screening 08/03/2028 8/3/2023    Lipid Panel 07/02/2029 7/2/2024    Colorectal Cancer Screening 12/22/2030 12/22/2020        Orders Placed This Encounter   Procedures    Microalbumin/Creatinine Ratio, Urine    Hepatitis C Antibody    Lipid Panel     The following information is provided to all patients.  This information is to help you find resources for any of the problems found today that may be affecting your health:                  Living healthy guide: ms.gov    Understanding Diabetes: www.diabetes.org      Eating healthy: www.cdc.gov/healthyweight      CDC home safety checklist: www.cdc.gov/steadi/patient.html      Agency on Aging: ms.gov    Alcoholics anonymous (AA): www.aa.org      Physical Activity: www.db.nih.gov/mt1krby      Tobacco use: ms.gov

## 2024-10-02 ENCOUNTER — PATIENT MESSAGE (OUTPATIENT)
Dept: FAMILY MEDICINE | Facility: CLINIC | Age: 64
End: 2024-10-02
Payer: MEDICARE

## 2024-10-02 RX ORDER — VALACYCLOVIR HYDROCHLORIDE 1 G/1
TABLET, FILM COATED ORAL
Qty: 4 TABLET | Refills: 11 | Status: SHIPPED | OUTPATIENT
Start: 2024-10-02

## 2024-10-24 ENCOUNTER — OFFICE VISIT (OUTPATIENT)
Dept: DERMATOLOGY | Facility: CLINIC | Age: 64
End: 2024-10-24
Payer: MEDICARE

## 2024-10-24 DIAGNOSIS — I10 HYPERTENSION, UNSPECIFIED TYPE: ICD-10-CM

## 2024-10-24 DIAGNOSIS — L40.50 PSORIATIC ARTHRITIS: ICD-10-CM

## 2024-10-24 DIAGNOSIS — L23.5 ALLERGIC DERMATITIS DUE TO OTHER CHEMICAL PRODUCT: ICD-10-CM

## 2024-10-24 DIAGNOSIS — Z79.899 HIGH RISK MEDICATION USE: ICD-10-CM

## 2024-10-24 DIAGNOSIS — L20.84 INTRINSIC ECZEMA: ICD-10-CM

## 2024-10-24 DIAGNOSIS — L28.1 PRURIGO NODULARIS: ICD-10-CM

## 2024-10-24 DIAGNOSIS — L40.0 PLAQUE PSORIASIS: Primary | ICD-10-CM

## 2024-10-24 PROCEDURE — 1160F RVW MEDS BY RX/DR IN RCRD: CPT | Mod: CPTII,,, | Performed by: DERMATOLOGY

## 2024-10-24 PROCEDURE — 87070 CULTURE OTHR SPECIMN AEROBIC: CPT | Mod: ,,, | Performed by: CLINICAL MEDICAL LABORATORY

## 2024-10-24 PROCEDURE — 99214 OFFICE O/P EST MOD 30 MIN: CPT | Mod: ,,, | Performed by: DERMATOLOGY

## 2024-10-24 PROCEDURE — 3061F NEG MICROALBUMINURIA REV: CPT | Mod: CPTII,,, | Performed by: DERMATOLOGY

## 2024-10-24 PROCEDURE — 1159F MED LIST DOCD IN RCRD: CPT | Mod: CPTII,,, | Performed by: DERMATOLOGY

## 2024-10-24 PROCEDURE — 4010F ACE/ARB THERAPY RXD/TAKEN: CPT | Mod: CPTII,,, | Performed by: DERMATOLOGY

## 2024-10-24 PROCEDURE — 3066F NEPHROPATHY DOC TX: CPT | Mod: CPTII,,, | Performed by: DERMATOLOGY

## 2024-10-24 RX ORDER — AMLODIPINE BESYLATE 2.5 MG/1
2.5 TABLET ORAL DAILY
Qty: 90 TABLET | Refills: 1 | Status: SHIPPED | OUTPATIENT
Start: 2024-10-24

## 2024-10-24 NOTE — TELEPHONE ENCOUNTER
----- Message from Shannon sent at 10/24/2024 10:14 AM CDT -----  Regarding: Med Refill  Contact: Patient  Pt needs:amLODIPine (NORVASC) 2.5 MG tablet, patient got her refill but lost it.    Pharmacy: [facility]    Phone #:[phone] (N)

## 2024-10-26 LAB — MICROORGANISM SPEC CULT: NORMAL

## 2024-10-28 RX ORDER — UPADACITINIB 30 MG/1
30 TABLET, EXTENDED RELEASE ORAL DAILY
Qty: 30 TABLET | Refills: 2 | Status: SHIPPED | OUTPATIENT
Start: 2024-10-28 | End: 2025-01-26

## 2024-11-07 ENCOUNTER — TELEPHONE (OUTPATIENT)
Dept: DERMATOLOGY | Facility: CLINIC | Age: 64
End: 2024-11-07
Payer: MEDICARE

## 2024-11-07 NOTE — TELEPHONE ENCOUNTER
Called and notified pt to take remaining shot 14 days from last injection. Verbalized understanding. Denies further questions or concerns at this time.     ----- Message from Radha sent at 11/7/2024 11:36 AM CST -----  Pt called and asked for Abelino, [phone].  Regarding Dupixent instructions, she said she has an extra shot left in the box, and doesn't understand why.

## 2024-11-13 ENCOUNTER — INFUSION (OUTPATIENT)
Dept: INFUSION THERAPY | Facility: HOSPITAL | Age: 64
End: 2024-11-13
Attending: FAMILY MEDICINE
Payer: MEDICARE

## 2024-11-13 VITALS
HEART RATE: 53 BPM | SYSTOLIC BLOOD PRESSURE: 124 MMHG | DIASTOLIC BLOOD PRESSURE: 66 MMHG | OXYGEN SATURATION: 100 % | RESPIRATION RATE: 17 BRPM

## 2024-11-13 DIAGNOSIS — E78.5 HYPERLIPIDEMIA, UNSPECIFIED HYPERLIPIDEMIA TYPE: ICD-10-CM

## 2024-11-13 DIAGNOSIS — R94.30 ABNORMAL RESULTS OF CARDIOVASCULAR FUNCTION STUDIES: Primary | ICD-10-CM

## 2024-11-13 DIAGNOSIS — Z78.9 STATIN INTOLERANCE: ICD-10-CM

## 2024-11-13 PROCEDURE — 63600175 PHARM REV CODE 636 W HCPCS: Mod: JZ,TB,UD | Performed by: FAMILY MEDICINE

## 2024-11-13 PROCEDURE — 96372 THER/PROPH/DIAG INJ SC/IM: CPT

## 2024-11-13 RX ORDER — METHYLPREDNISOLONE SOD SUCC 125 MG
125 VIAL (EA) INJECTION
OUTPATIENT
Start: 2025-02-01

## 2024-11-13 RX ORDER — EPINEPHRINE 0.3 MG/.3ML
0.3 INJECTION SUBCUTANEOUS ONCE AS NEEDED
OUTPATIENT
Start: 2025-02-01

## 2024-11-13 RX ORDER — ACETAMINOPHEN 325 MG/1
650 TABLET ORAL
OUTPATIENT
Start: 2025-02-01

## 2024-11-13 RX ORDER — METHYLPREDNISOLONE SOD SUCC 125 MG
80 VIAL (EA) INJECTION ONCE
OUTPATIENT
Start: 2025-02-01

## 2024-11-13 RX ORDER — DIPHENHYDRAMINE HYDROCHLORIDE 50 MG/ML
50 INJECTION INTRAMUSCULAR; INTRAVENOUS ONCE AS NEEDED
OUTPATIENT
Start: 2025-02-01

## 2024-11-13 RX ORDER — KETOROLAC TROMETHAMINE 30 MG/ML
15 INJECTION, SOLUTION INTRAMUSCULAR; INTRAVENOUS ONCE AS NEEDED
OUTPATIENT
Start: 2025-02-01

## 2024-11-13 RX ORDER — ONDANSETRON HYDROCHLORIDE 2 MG/ML
8 INJECTION, SOLUTION INTRAVENOUS ONCE AS NEEDED
OUTPATIENT
Start: 2025-02-01

## 2024-11-13 RX ADMIN — INCLISIRAN 284 MG: 284 INJECTION, SOLUTION SUBCUTANEOUS at 09:11

## 2024-11-13 NOTE — PROGRESS NOTES
0900 pt here for leqvio injection, resting in recliner, TP released and pharmacy notified  0924 Leqvio given subcutaneously in the upper right arm, tolerated well, will continue to monitor  0935 pt discharged ambulatory with no adverse reaction noted, pt notified to go to ER with any adverse reactions, AVS given along with medication information  Follow up appt made 6 months

## 2024-11-18 ENCOUNTER — TELEPHONE (OUTPATIENT)
Dept: DERMATOLOGY | Facility: CLINIC | Age: 64
End: 2024-11-18
Payer: MEDICARE

## 2024-11-18 DIAGNOSIS — F17.200 SMOKER: ICD-10-CM

## 2024-11-18 DIAGNOSIS — I10 HYPERTENSION, UNSPECIFIED TYPE: ICD-10-CM

## 2024-11-18 NOTE — TELEPHONE ENCOUNTER
Call back to pt wit no answer. Left voicemail for call back.     ----- Message from Radha sent at 11/18/2024  2:53 PM CST -----  Pt asked for Abelino - [phone], Has questions about the bumps on her abdomen and the Dupixent.

## 2024-11-19 RX ORDER — BENAZEPRIL HYDROCHLORIDE 20 MG/1
20 TABLET ORAL
Qty: 90 TABLET | Refills: 0 | Status: SHIPPED | OUTPATIENT
Start: 2024-11-19

## 2024-11-19 RX ORDER — VARENICLINE TARTRATE 1 MG/1
1 TABLET, FILM COATED ORAL 2 TIMES DAILY
Qty: 180 TABLET | Refills: 0 | Status: SHIPPED | OUTPATIENT
Start: 2024-11-19

## 2024-11-20 ENCOUNTER — OFFICE VISIT (OUTPATIENT)
Dept: FAMILY MEDICINE | Facility: CLINIC | Age: 64
End: 2024-11-20
Payer: MEDICARE

## 2024-11-20 VITALS
DIASTOLIC BLOOD PRESSURE: 82 MMHG | OXYGEN SATURATION: 97 % | TEMPERATURE: 99 F | BODY MASS INDEX: 31.45 KG/M2 | SYSTOLIC BLOOD PRESSURE: 130 MMHG | HEIGHT: 67 IN | HEART RATE: 69 BPM | WEIGHT: 200.38 LBS | RESPIRATION RATE: 18 BRPM

## 2024-11-20 DIAGNOSIS — E78.5 HYPERLIPIDEMIA, UNSPECIFIED HYPERLIPIDEMIA TYPE: Primary | ICD-10-CM

## 2024-11-20 DIAGNOSIS — Z12.31 ENCOUNTER FOR SCREENING MAMMOGRAM FOR MALIGNANT NEOPLASM OF BREAST: ICD-10-CM

## 2024-11-20 DIAGNOSIS — Z12.4 SCREENING FOR CERVICAL CANCER: ICD-10-CM

## 2024-11-20 DIAGNOSIS — I10 HYPERTENSION, UNSPECIFIED TYPE: ICD-10-CM

## 2024-11-20 PROCEDURE — 3008F BODY MASS INDEX DOCD: CPT | Mod: ,,, | Performed by: FAMILY MEDICINE

## 2024-11-20 PROCEDURE — 4010F ACE/ARB THERAPY RXD/TAKEN: CPT | Mod: ,,, | Performed by: FAMILY MEDICINE

## 2024-11-20 PROCEDURE — 3075F SYST BP GE 130 - 139MM HG: CPT | Mod: ,,, | Performed by: FAMILY MEDICINE

## 2024-11-20 PROCEDURE — 3066F NEPHROPATHY DOC TX: CPT | Mod: ,,, | Performed by: FAMILY MEDICINE

## 2024-11-20 PROCEDURE — 3079F DIAST BP 80-89 MM HG: CPT | Mod: ,,, | Performed by: FAMILY MEDICINE

## 2024-11-20 PROCEDURE — 1159F MED LIST DOCD IN RCRD: CPT | Mod: ,,, | Performed by: FAMILY MEDICINE

## 2024-11-20 PROCEDURE — 3061F NEG MICROALBUMINURIA REV: CPT | Mod: ,,, | Performed by: FAMILY MEDICINE

## 2024-11-20 PROCEDURE — 1160F RVW MEDS BY RX/DR IN RCRD: CPT | Mod: ,,, | Performed by: FAMILY MEDICINE

## 2024-11-20 PROCEDURE — 99213 OFFICE O/P EST LOW 20 MIN: CPT | Mod: ,,, | Performed by: FAMILY MEDICINE

## 2024-11-20 NOTE — PROGRESS NOTES
Janene Castillo is a 64 y.o. female seen today for follow-up on her hyperlipidemia.  She is not fasting today but will stop by the clinic later this week for blood work.  Overall she is doing better with no chest pain or shortness for breath and the itching has resolved with some medication changes involving her psoriasis.    Past Medical History:   Diagnosis Date    Allergic contact dermatitis     balsam of peru, bacitracin, fragrance, methyldibromo glutaronite    Anxiety     Chronic pain     HTN (hypertension)     Hyperlipidemia     Plaque psoriasis     Prurigo nodularis     Psoriatic arthritis     Stage 3b chronic kidney disease      Family History   Problem Relation Name Age of Onset    Hypertension Mother      Heart disease Father      Heart attack Father      Heart failure Father      Pacemaker/defibrilator Father      Melanoma Neg Hx       Current Outpatient Medications on File Prior to Visit   Medication Sig Dispense Refill    amLODIPine (NORVASC) 2.5 MG tablet Take 1 tablet (2.5 mg total) by mouth once daily. 90 tablet 1    benazepriL (LOTENSIN) 20 MG tablet Take 1 tablet by mouth once daily 90 tablet 0    buPROPion (WELLBUTRIN) 100 MG tablet Take 1 tablet by mouth twice daily 180 tablet 1    calcipotriene-betamethasone (TACLONEX) ointment Apply topically to affected area twice daily, tapering with improvement. 100 g 1    calcium carbonate (OS-CAIN) 600 mg calcium (1,500 mg) Tab Take 600 mg by mouth once daily.      coenzyme Q10 100 mg capsule as directed Orally      ezetimibe (ZETIA) 10 mg tablet Take 1 tablet (10 mg total) by mouth every evening. 90 tablet 1    famotidine (PEPCID) 20 MG tablet Take 1 tablet by mouth twice daily 180 tablet 0    linaCLOtide (LINZESS) 145 mcg Cap capsule Take 1 capsule (145 mcg total) by mouth before breakfast. 90 capsule 1    mupirocin (BACTROBAN) 2 % ointment Apply to affected areas TID 60 g 2    oxyCODONE-acetaminophen (PERCOCET)  mg per tablet Take 1 tablet by mouth 3  (three) times daily as needed.      predniSONE (DELTASONE) 10 MG tablet Take 6 tab po daily x1 day, 4 tab po daily x2 days, 3 tabs po daily x3 days, 2 tabs po daily x4 days, 1 tab po daily x5 days (Patient not taking: Reported on 10/1/2024) 36 tablet 0    RINVOQ 15 mg 24 hr tablet Take 15 mg by mouth once daily.      tiZANidine (ZANAFLEX) 4 MG tablet Take 1 tablet by mouth twice daily 180 tablet 1    upadacitinib (RINVOQ) 30 mg Tb24 Take 1 tablet (30 mg total) by mouth once daily. 30 tablet 2    valACYclovir (VALTREX) 1000 MG tablet Take 2 tablets b.i.d. x1 day as needed for a cold sore 4 tablet 11    varenicline (CHANTIX) 1 mg Tab Take 1 tablet by mouth twice daily 180 tablet 0    zinc gluconate 50 mg tablet Take 50 mg by mouth once daily.       Current Facility-Administered Medications on File Prior to Visit   Medication Dose Route Frequency Provider Last Rate Last Admin    inclisiran 284 mg/1.5 mL subcutaneous injection 284 mg  284 mg Subcutaneous Q90 Days Asael Huizar MD         Immunization History   Administered Date(s) Administered    COVID-19 MRNA, LN-S PF (MODERNA HALF 0.25 ML DOSE) 04/26/2022    COVID-19, MRNA, LN-S, PF (MODERNA FULL 0.5 ML DOSE) 06/22/2021, 08/06/2021    COVID-19, mRNA, LNP-S, bivalent booster, PF (Moderna Omicron)12 + YEARS 11/16/2022       Review of Systems   Constitutional:  Negative for fever, malaise/fatigue and weight loss.   Respiratory:  Negative for shortness of breath.    Cardiovascular:  Negative for chest pain and palpitations.   Gastrointestinal:  Negative for nausea and vomiting.   Psychiatric/Behavioral:  Negative for depression.         Vitals:    11/20/24 1347   BP: 130/82   Pulse: 69   Resp: 18   Temp: 98.9 °F (37.2 °C)       Physical Exam  Vitals reviewed.   Eyes:      Conjunctiva/sclera: Conjunctivae normal.   Pulmonary:      Effort: Pulmonary effort is normal.   Neurological:      Mental Status: She is alert.   Psychiatric:         Mood and Affect: Mood  normal.         Behavior: Behavior normal.         Thought Content: Thought content normal.         Judgment: Judgment normal.          Assessment and Plan  1. Hyperlipidemia, unspecified hyperlipidemia type  -     Lipid Panel; Future; Expected date: 11/27/2024    2. Encounter for screening mammogram for malignant neoplasm of breast  -     Mammo Digital Screening Bilat w/ Jaciel; Future; Expected date: 11/20/2024    3. Screening for cervical cancer  -     Ambulatory referral/consult to Obstetrics / Gynecology; Future; Expected date: 11/27/2024    4. Hypertension, unspecified type  -     CBC Auto Differential; Future; Expected date: 11/27/2024  -     Comprehensive Metabolic Panel; Future; Expected date: 11/27/2024             Return to clinic in 3 months or as needed.    Health Maintenance Topics with due status: Not Due       Topic Last Completion Date    Colorectal Cancer Screening 12/22/2020    Cervical Cancer Screening 08/03/2023    Lipid Panel 07/02/2024    Annual UACr 10/01/2024

## 2024-11-21 ENCOUNTER — TELEPHONE (OUTPATIENT)
Dept: INFUSION THERAPY | Facility: HOSPITAL | Age: 64
End: 2024-11-21
Payer: MEDICARE

## 2024-11-21 DIAGNOSIS — L20.84 INTRINSIC ECZEMA: ICD-10-CM

## 2024-11-21 RX ORDER — UPADACITINIB 30 MG/1
30 TABLET, EXTENDED RELEASE ORAL DAILY
Qty: 30 TABLET | Refills: 2 | Status: SHIPPED | OUTPATIENT
Start: 2024-11-21 | End: 2025-02-19

## 2024-11-21 NOTE — TELEPHONE ENCOUNTER
2642 pt called stating that her legs have been hurting bad enough to wake her from her sleep after receiving her first Leqvio injection.  Discussed with pt that it does mention possible side effect being leg or arm pain and also joint pain.  Informed pt that I would put a note in and also let provider know.  Pt will see how long the pain lasts and decide with her provider about getting the next injection in 3 months.

## 2024-12-04 ENCOUNTER — TELEPHONE (OUTPATIENT)
Dept: DERMATOLOGY | Facility: CLINIC | Age: 64
End: 2024-12-04
Payer: MEDICARE

## 2024-12-04 ENCOUNTER — OFFICE VISIT (OUTPATIENT)
Dept: DERMATOLOGY | Facility: CLINIC | Age: 64
End: 2024-12-04
Payer: MEDICARE

## 2024-12-04 DIAGNOSIS — L40.50 PSORIATIC ARTHRITIS: ICD-10-CM

## 2024-12-04 DIAGNOSIS — L28.1 PRURIGO NODULARIS: Primary | ICD-10-CM

## 2024-12-04 DIAGNOSIS — L40.0 PLAQUE PSORIASIS: ICD-10-CM

## 2024-12-04 PROCEDURE — 1160F RVW MEDS BY RX/DR IN RCRD: CPT | Mod: CPTII,,, | Performed by: DERMATOLOGY

## 2024-12-04 PROCEDURE — 3061F NEG MICROALBUMINURIA REV: CPT | Mod: CPTII,,, | Performed by: DERMATOLOGY

## 2024-12-04 PROCEDURE — 4010F ACE/ARB THERAPY RXD/TAKEN: CPT | Mod: CPTII,,, | Performed by: DERMATOLOGY

## 2024-12-04 PROCEDURE — 3066F NEPHROPATHY DOC TX: CPT | Mod: CPTII,,, | Performed by: DERMATOLOGY

## 2024-12-04 PROCEDURE — 1159F MED LIST DOCD IN RCRD: CPT | Mod: CPTII,,, | Performed by: DERMATOLOGY

## 2024-12-04 PROCEDURE — 99214 OFFICE O/P EST MOD 30 MIN: CPT | Mod: ,,, | Performed by: DERMATOLOGY

## 2024-12-04 RX ORDER — DUPILUMAB 300 MG/2ML
300 INJECTION, SOLUTION SUBCUTANEOUS
Qty: 4 ML | Refills: 11 | Status: SHIPPED | OUTPATIENT
Start: 2024-12-04 | End: 2024-12-04

## 2024-12-04 NOTE — PROGRESS NOTES
[facility]   Tatum Perry MD    Patient Name: Janene Castillo  Patient YOB: 1960   Date of Service: 24    CC: Follow-up Prurigo Nodularis    HPI: Janene Castillo is a 64 y.o. female here today for follow-up of PN and psoriasis last seen 10/24/24.  Previous treatments include methotrexate, clobetasol, triamicnoilone, betamethasone, dupixent, elidel, protopic, mupirocin,  and prednisone. She is currently on Rinvoq, TAC, and Mupirocin. Overall, the psoriasis is improved. The prurigo nodularis is slightly improved, but not at treatment goal. Treatment plan was followed as directed.    Past Medical History:   Diagnosis Date    Allergic contact dermatitis     balsam of peru, bacitracin, fragrance, methyldibromo glutaronite    Anxiety     Chronic pain     HTN (hypertension)     Hyperlipidemia     Plaque psoriasis     Prurigo nodularis     Psoriatic arthritis     Stage 3b chronic kidney disease      Past Surgical History:   Procedure Laterality Date    ANGIOGRAM, CORONARY, WITH LEFT HEART CATHETERIZATION N/A 2022    Procedure: Angiogram, Coronary, with Left Heart Cath;  Surgeon: Jason Ratliff MD;  Location: [facility] CATH LAB;  Service: Cardiology;  Laterality: N/A;     SECTION       Review of patient's allergies indicates:   Allergen Reactions    Bacitracin Dermatitis    Codeine sulfate     Codeine Rash     Rash     Methyldibromoglutaronitrile Dermatitis    Permethrin Rash     Rash     Sulfa (sulfonamide antibiotics) Other (See Comments) and Rash     unknown       Current Outpatient Medications:     amLODIPine (NORVASC) 2.5 MG tablet, Take 1 tablet (2.5 mg total) by mouth once daily., Disp: 90 tablet, Rfl: 1    benazepriL (LOTENSIN) 20 MG tablet, Take 1 tablet by mouth once daily, Disp: 90 tablet, Rfl: 0    buPROPion (WELLBUTRIN) 100 MG tablet, Take 1 tablet by mouth twice daily, Disp: 180 tablet, Rfl: 1    calcipotriene-betamethasone (TACLONEX) ointment, Apply topically to  affected area twice daily, tapering with improvement., Disp: 100 g, Rfl: 1    calcium carbonate (OS-CAIN) 600 mg calcium (1,500 mg) Tab, Take 600 mg by mouth once daily., Disp: , Rfl:     coenzyme Q10 100 mg capsule, as directed Orally, Disp: , Rfl:     dupilumab (DUPIXENT PEN) 300 mg/2 mL PnIj, Inject 2 mLs (300 mg total) into the skin every 14 (fourteen) days., Disp: 4 mL, Rfl: 11    ezetimibe (ZETIA) 10 mg tablet, Take 1 tablet (10 mg total) by mouth every evening., Disp: 90 tablet, Rfl: 1    famotidine (PEPCID) 20 MG tablet, Take 1 tablet by mouth twice daily, Disp: 180 tablet, Rfl: 0    linaCLOtide (LINZESS) 145 mcg Cap capsule, Take 1 capsule (145 mcg total) by mouth before breakfast., Disp: 90 capsule, Rfl: 1    mupirocin (BACTROBAN) 2 % ointment, Apply to affected areas TID, Disp: 60 g, Rfl: 2    oxyCODONE-acetaminophen (PERCOCET)  mg per tablet, Take 1 tablet by mouth 3 (three) times daily as needed., Disp: , Rfl:     predniSONE (DELTASONE) 10 MG tablet, Take 6 tab po daily x1 day, 4 tab po daily x2 days, 3 tabs po daily x3 days, 2 tabs po daily x4 days, 1 tab po daily x5 days (Patient not taking: Reported on 10/1/2024), Disp: 36 tablet, Rfl: 0    RINVOQ 15 mg 24 hr tablet, Take 15 mg by mouth once daily., Disp: , Rfl:     tiZANidine (ZANAFLEX) 4 MG tablet, Take 1 tablet by mouth twice daily, Disp: 180 tablet, Rfl: 1    upadacitinib (RINVOQ) 30 mg Tb24, Take 1 tablet (30 mg total) by mouth once daily., Disp: 30 tablet, Rfl: 2    valACYclovir (VALTREX) 1000 MG tablet, Take 2 tablets b.i.d. x1 day as needed for a cold sore, Disp: 4 tablet, Rfl: 11    varenicline (CHANTIX) 1 mg Tab, Take 1 tablet by mouth twice daily, Disp: 180 tablet, Rfl: 0    zinc gluconate 50 mg tablet, Take 50 mg by mouth once daily., Disp: , Rfl:     Current Facility-Administered Medications:     inclisiran 284 mg/1.5 mL subcutaneous injection 284 mg, 284 mg, Subcutaneous, Q90 Days, Asael Huizar MD    ROS: A focused review  of systems was obtained and negative.     Exam: A focused skin exam was performed. All areas examined were normal except as mentioned in the assessment and plan below.  General Appearance of the patient is well developed and well nourished.  Orientation: alert and oriented x 3.  Mood and affect: pleasant.    Assessment:   The primary encounter diagnosis was Prurigo nodularis. Diagnoses of Plaque psoriasis and Psoriatic arthritis were also pertinent to this visit.    Plan:   Medications Ordered This Encounter   Medications    dupilumab (DUPIXENT PEN) 300 mg/2 mL PnIj     Sig: Inject 2 mLs (300 mg total) into the skin every 14 (fourteen) days.     Dispense:  4 mL     Refill:  11       Prurigo Nodularis  - erythematous nodules with central erosions located on the bilateral extremities and trunk  Severity: Severe  Estimated nodule count: 20  Status:Inadequately controlled     Plan: Counseling  I counseled the patient regarding the following:  Skin care: Recommend trimming nails short, anti-itch lotions such as Sarna, topical steroids and antihistamines.  Expectations: Prurigo Nodularis is a self-inflicted lesion that results from picking or rubbing the same spot of skin over and over again. If the itch-scratch cycle is broken, the lesions will resolve.  Contact office if: Prurigo Nodularis worsens, or if itching is accompanied by constitutional symptoms.    - will start back Dupixent, as pt has shown slight improvement with increasing Rinvoq, but not as treatment goal    Plaque Psoriasis  - psoriasiform plaques with micaceous scale  Status: Improved      Plan: Counseling  I counseled the patient regarding the following:  Skin care: Emollients, ambient sun exposure, shampoos with tar, selenium or zinc pyrithione can improve psoriasis.  Expectations: Psoriasis is chronic in nature with periods of remissions and flares. Flares can be triggered by stress, infections (group A strep), certain medications and alcohol.  Contact  office if: Psoriasis worsens, or fails to improve despite several months of treatment.    - continue Rinvoq with Nazia Cárdenas      Psoriatic Arthritis  - Psoriasiform plaques with micaceous scale  Status: Well controlled    Plan: Counseling.  I counseled the patient regarding the following:  Instructions: Systemic medications are the treatment of choice for psoriatic arthritis. Treatment options include  anti-TNF therapy and methotrexate.  Expectations: Psoriatic arthritis is a type of inflammatory arthritis which occurs in conjunction with psoriasis.  Approximately 5% of psoriasis patients develop psoriatic arthritis. Psoriatic arthritis is chronic in nature with  periods of remissions and flares. Flares can be triggered by stress, infections (group A strep), certain medications  and alcohol. Psoriatic arthritis requires systemic medication for adequate treatment.  Contact office if: Your psoriatic arthritis worsens, or fails to improve despite several months of treatment.    - Continue Rinvoq    High Risk Medication Monitoring (Z79.899) : The risks and benefits of the medication were reviewed in full with the patient. Should any side effects occur, the patient will stop the medication and contact me immediately.    Rinvoq counseling: I discussed with patient potential side effects including lowered immune system, infections, cardiovascular risk including heart attack and stroke, allergic reactions, blot clots, cancer, and tears in the stomach or intestines.  I discussed lab monitoring with patient.  Patient will call with any side effects including symptoms of infection.     Dupixent Counseling: I discussed with the patient the risks of dupilumab including but not limited to eye infection and irritation, cold sores, injection site reactions, worsening of asthma, allergic reactions and increased risk of parasitic infection. Live vaccines should be avoided while taking dupilumab. Dupilumab will also interact with  certain medications such as warfarin and cyclosporine. The patient understands that monitoring is required and they must alert us or the primary physician if symptoms of infection or other concerning signs are noted.      Follow up if symptoms worsen or fail to improve.    Tatum Perry MD

## 2024-12-04 NOTE — TELEPHONE ENCOUNTER
Called and spoke with pt. Appointment scheduled for today @ 13:00.    ----- Message from Radha sent at 12/3/2024  9:51 AM CST -----  Pt [phone] asked for Abelino, rash on breast and shoulder isn't going away, needs you to cancel a script for speciality pharm because they charge more than [facility], and a message from her rheumatologist.

## 2024-12-09 ENCOUNTER — TELEPHONE (OUTPATIENT)
Dept: DERMATOLOGY | Facility: CLINIC | Age: 64
End: 2024-12-09
Payer: MEDICARE

## 2024-12-09 NOTE — TELEPHONE ENCOUNTER
Call back to pt. Patient states she cannot remember the Mupirocin manufacturers she can use with her allergies and was unable to get her allergist's office on the phone. Informed pt per Dr. Novoa's notes she may use Mupirocin manufactured by [employer], [employer], or [employer]. Verbalized understanding. Denies further questions or concerns at this time.     ----- Message from Eliana sent at 12/9/2024 11:26 AM CST -----  PATIENT CALL TO SEE WHAT MEDICATION SHE CAN USE, CALL BACK NUMBER [phone]

## 2024-12-16 ENCOUNTER — TELEPHONE (OUTPATIENT)
Dept: DERMATOLOGY | Facility: CLINIC | Age: 64
End: 2024-12-16
Payer: MEDICARE

## 2024-12-16 NOTE — TELEPHONE ENCOUNTER
Patient states Dupixent is causing her to have an increase in cold sores and she would like to stop the Dupixent at this time. Informed pt that Dr. Perry says it is ok to stop Dupixent at this time, but to call the clinic if condition worsens to consider following up with Dr. Novoa @ [facility] for a second opinion. Verbalized understanding.     ----- Message from Radha sent at 12/16/2024  8:13 AM CST -----  Would like to speak to Abelino about Dupixent. [phone]  
Cardiac

## 2024-12-18 ENCOUNTER — PATIENT MESSAGE (OUTPATIENT)
Dept: FAMILY MEDICINE | Facility: CLINIC | Age: 64
End: 2024-12-18
Payer: MEDICARE

## 2024-12-26 DIAGNOSIS — M62.838 SPASM OF MUSCLE: ICD-10-CM

## 2024-12-27 RX ORDER — TIZANIDINE 4 MG/1
4 TABLET ORAL 2 TIMES DAILY
Qty: 180 TABLET | Refills: 1 | Status: SHIPPED | OUTPATIENT
Start: 2024-12-27

## 2025-01-02 ENCOUNTER — OFFICE VISIT (OUTPATIENT)
Dept: DERMATOLOGY | Facility: CLINIC | Age: 65
End: 2025-01-02
Payer: MEDICARE

## 2025-01-02 ENCOUNTER — TELEPHONE (OUTPATIENT)
Dept: DERMATOLOGY | Facility: CLINIC | Age: 65
End: 2025-01-02
Payer: MEDICARE

## 2025-01-02 VITALS — HEIGHT: 67 IN | WEIGHT: 200.38 LBS | RESPIRATION RATE: 18 BRPM | BODY MASS INDEX: 31.45 KG/M2

## 2025-01-02 DIAGNOSIS — L23.9 ALLERGIC CONTACT DERMATITIS, UNSPECIFIED TRIGGER: ICD-10-CM

## 2025-01-02 DIAGNOSIS — L08.9 SKIN INFECTION: Primary | ICD-10-CM

## 2025-01-02 DIAGNOSIS — L30.9 DERMATITIS, UNSPECIFIED: ICD-10-CM

## 2025-01-02 DIAGNOSIS — L40.50 PSORIATIC ARTHRITIS: ICD-10-CM

## 2025-01-02 DIAGNOSIS — L28.1 PRURIGO NODULARIS: ICD-10-CM

## 2025-01-02 DIAGNOSIS — L40.9 PSORIASIS: ICD-10-CM

## 2025-01-02 DIAGNOSIS — B86 SCABIES: ICD-10-CM

## 2025-01-02 DIAGNOSIS — Z79.899 HIGH RISK MEDICATION USE: ICD-10-CM

## 2025-01-02 PROCEDURE — 87070 CULTURE OTHR SPECIMN AEROBIC: CPT | Mod: ,,, | Performed by: CLINICAL MEDICAL LABORATORY

## 2025-01-02 RX ORDER — DOXYCYCLINE 100 MG/1
CAPSULE ORAL
Qty: 14 CAPSULE | Refills: 0 | Status: SHIPPED | OUTPATIENT
Start: 2025-01-02

## 2025-01-02 RX ORDER — IVERMECTIN 3 MG/1
TABLET ORAL
Qty: 12 TABLET | Refills: 0 | Status: SHIPPED | OUTPATIENT
Start: 2025-01-02

## 2025-01-02 NOTE — PROGRESS NOTES
[facility]   Tatum Perry MD    Patient Name: Janene Castillo  Patient YOB: 1960   Date of Service: 25    CC: Follow-up Psoriasis, Prurigo Nodularis, and Allergic Contact Derm    HPI: Janene Castillo is a 64 y.o. female here today for follow-up of psoriasis, PN, and ACD, last seen 2024.  Previous treatments include Prednisone, Dupixent, Cosentyx, Stelara, Skyrizi, clobetasol, betamethasone, Elidel, and ProTopic, TAC, and currently on Rinvoq.  Overall, the psoriasis, PN, and ACD is worse.  Treatment plan was followed as directed.    Past Medical History:   Diagnosis Date    Allergic contact dermatitis     balsam of peru, bacitracin, fragrance, methyldibromo glutaronite    Anxiety     Chronic pain     HTN (hypertension)     Hyperlipidemia     Plaque psoriasis     Prurigo nodularis     Psoriatic arthritis     Stage 3b chronic kidney disease      Past Surgical History:   Procedure Laterality Date    ANGIOGRAM, CORONARY, WITH LEFT HEART CATHETERIZATION N/A 2022    Procedure: Angiogram, Coronary, with Left Heart Cath;  Surgeon: Jason Ratliff MD;  Location: [facility] CATH LAB;  Service: Cardiology;  Laterality: N/A;     SECTION       Review of patient's allergies indicates:   Allergen Reactions    Bacitracin Dermatitis    Codeine sulfate     Codeine Rash     Rash     Methyldibromoglutaronitrile Dermatitis    Permethrin Rash     Rash     Sulfa (sulfonamide antibiotics) Other (See Comments) and Rash     unknown       Current Outpatient Medications:     amLODIPine (NORVASC) 2.5 MG tablet, Take 1 tablet (2.5 mg total) by mouth once daily., Disp: 90 tablet, Rfl: 1    benazepriL (LOTENSIN) 20 MG tablet, Take 1 tablet by mouth once daily, Disp: 90 tablet, Rfl: 0    buPROPion (WELLBUTRIN) 100 MG tablet, Take 1 tablet by mouth twice daily, Disp: 180 tablet, Rfl: 1    calcipotriene-betamethasone (TACLONEX) ointment, Apply topically to affected area twice daily, tapering with  improvement., Disp: 100 g, Rfl: 1    calcium carbonate (OS-CAIN) 600 mg calcium (1,500 mg) Tab, Take 600 mg by mouth once daily., Disp: , Rfl:     coenzyme Q10 100 mg capsule, as directed Orally, Disp: , Rfl:     doxycycline (VIBRAMYCIN) 100 MG Cap, Take one 100mg capsule PO BID. AVOID TAKING WITH DAIRY PRODUCTS, Disp: 14 capsule, Rfl: 0    dupilumab (DUPIXENT) 300 mg/2 mL Syrg, Inject 2 mLs (300 mg total) into the skin every 14 (fourteen) days., Disp: 4 mL, Rfl: 11    ezetimibe (ZETIA) 10 mg tablet, Take 1 tablet (10 mg total) by mouth every evening., Disp: 90 tablet, Rfl: 1    famotidine (PEPCID) 20 MG tablet, Take 1 tablet by mouth twice daily, Disp: 180 tablet, Rfl: 0    ivermectin (STROMECTOL) 3 mg Tab, Take 6 tablets PO today. Repeat in one week, Disp: 12 tablet, Rfl: 0    linaCLOtide (LINZESS) 145 mcg Cap capsule, Take 1 capsule (145 mcg total) by mouth before breakfast., Disp: 90 capsule, Rfl: 1    mupirocin (BACTROBAN) 2 % ointment, Apply to affected areas TID, Disp: 60 g, Rfl: 2    oxyCODONE-acetaminophen (PERCOCET)  mg per tablet, Take 1 tablet by mouth 3 (three) times daily as needed., Disp: , Rfl:     predniSONE (DELTASONE) 10 MG tablet, Take 6 tab po daily x1 day, 4 tab po daily x2 days, 3 tabs po daily x3 days, 2 tabs po daily x4 days, 1 tab po daily x5 days (Patient not taking: Reported on 10/1/2024), Disp: 36 tablet, Rfl: 0    RINVOQ 15 mg 24 hr tablet, Take 15 mg by mouth once daily., Disp: , Rfl:     tiZANidine (ZANAFLEX) 4 MG tablet, Take 1 tablet by mouth twice daily, Disp: 180 tablet, Rfl: 1    upadacitinib (RINVOQ) 30 mg Tb24, Take 1 tablet (30 mg total) by mouth once daily., Disp: 30 tablet, Rfl: 2    valACYclovir (VALTREX) 1000 MG tablet, Take 2 tablets b.i.d. x1 day as needed for a cold sore, Disp: 4 tablet, Rfl: 11    varenicline (CHANTIX) 1 mg Tab, Take 1 tablet by mouth twice daily, Disp: 180 tablet, Rfl: 0    zinc gluconate 50 mg tablet, Take 50 mg by mouth once daily., Disp: ,  Rfl:     Current Facility-Administered Medications:     inclisiran 284 mg/1.5 mL subcutaneous injection 284 mg, 284 mg, Subcutaneous, Q90 Days, Asael Huizar MD    ROS: A focused review of systems was obtained and negative.     Exam: A focused skin exam was performed. All areas examined were normal except as mentioned in the assessment and plan below.  General Appearance of the patient is well developed and well nourished.  Orientation: alert and oriented x 3.  Mood and affect: pleasant.    Assessment:   The primary encounter diagnosis was Skin infection. Diagnoses of Allergic contact dermatitis, unspecified trigger, Prurigo nodularis, Psoriasis, Scabies, High risk medication use, Psoriatic arthritis, and Dermatitis, unspecified were also pertinent to this visit.    Plan:   Medications Ordered This Encounter   Medications    doxycycline (VIBRAMYCIN) 100 MG Cap     Sig: Take one 100mg capsule PO BID. AVOID TAKING WITH DAIRY PRODUCTS     Dispense:  14 capsule     Refill:  0    ivermectin (STROMECTOL) 3 mg Tab     Sig: Take 6 tablets PO today. Repeat in one week     Dispense:  12 tablet     Refill:  0     Order Specific Question:   Current clinical data does NOT support the use of Ivermectin for the treatment or prevention of COVID-19, and as such, should NOT be used outside of a clinical trial for this purpose. Click Yes to acknowledge.     Answer:   Yes     Allergic Contact Dermatitis (L23.89)  - Well demarcated, geometric eczematous patches  Status: stable    Plan: Counseling.  I counseled the patient regarding the following:  Skin care: Patient should use hypoallergenic products such as unscented soaps. Eliminate exposure to all new  cosmetics, fragrances, hair products, nail products shampoos, scented soaps, plants, metals and sunscreens.  Expectations: Allergic contact dermatitis can persist for several weeks before it fully resolves. Sometimes, patch  testing is necessary if reactions persist or if the  patient is in contact with several potential allergens.  Contact office if: Allergic contact dermatitis worsens or fails to improve despite several weeks of treatment.  - continue to follow [place] list   - Will refer to Dr. Novoa for second opinion if she is unable to update her neris for new [place] list    Plaque Psoriasis  - clear  Status: stable    Plan: Counseling  I counseled the patient regarding the following:  Skin care: Emollients, ambient sun exposure, shampoos with tar, selenium or zinc pyrithione can improve psoriasis.  Expectations: Psoriasis is chronic in nature with periods of remissions and flares. Flares can be triggered by stress, infections (group A strep), certain medications and alcohol.  Contact office if: Psoriasis worsens, or fails to improve despite several months of treatment.    - continue rinvoq    Psoriatic Arthritis  - Psoriasiform plaques with micaceous scale  Status: stable    Plan: Counseling.  I counseled the patient regarding the following:  Instructions: Systemic medications are the treatment of choice for psoriatic arthritis. Treatment options include  anti-TNF therapy and methotrexate.  Expectations: Psoriatic arthritis is a type of inflammatory arthritis which occurs in conjunction with psoriasis.  Approximately 5% of psoriasis patients develop psoriatic arthritis. Psoriatic arthritis is chronic in nature with  periods of remissions and flares. Flares can be triggered by stress, infections (group A strep), certain medications  and alcohol. Psoriatic arthritis requires systemic medication for adequate treatment.  Contact office if: Your psoriatic arthritis worsens, or fails to improve despite several months of treatment.    - continue rinvoq    Prurigo Nodularis  - erythematous nodules with central erosions located on the trunk and bilateral extremities  Severity: Moderate   Estimated nodule count: 20    Plan: Counseling  I counseled the patient regarding the following:  Skin care:  Recommend trimming nails short, anti-itch lotions such as Sarna, topical steroids and antihistamines.  Expectations: Prurigo Nodularis is a self-inflicted lesion that results from picking or rubbing the same spot of skin over and over again. If the itch-scratch cycle is broken, the lesions will resolve.  Contact office if: Prurigo Nodularis worsens, or if itching is accompanied by constitutional symptoms.    - continue rinvoq    Scabies (B86)  - erythematous papules and vesicles with linear burrows  Status: Inadequately controlled   Plan: Counseling.  I counseled the patient regarding the following:  Contact office if: Scabies fails to resolve after several weeks of treatment.  - Will empirically treat for scabies, will send in Ivermectin     Skin Infection, NOS   - some pustules on exam per below    Plan: Counseling  I counseled the patient regarding the following:  Skin care: Patients with purulence or fluid collections should have their wounds re-opened, drained, cultured and irrigated. All wound infections should be treated with antibiotics.  Expectations: Wound Infections usually occur 4-7 days postoperatively. Patients exhibit, pain, rednes, swelling, cellulitic changes and fever.  Contact Office if: Wound Infection fails to respond to treatment or worsens, patient develops a fever, or if redness spreads despite antibiotics.    A bacterial culture was obtained from the back    High Risk Medication Monitoring (Z79.899) : The risks and benefits of the medication were reviewed in full with the patient. Should any side effects occur, the patient will stop the medication and contact me immediately.    Rinvoq counseling: I discussed with patient potential side effects including lowered immune system, infections, cardiovascular risk including heart attack and stroke, allergic reactions, blot clots, cancer, and tears in the stomach or intestines.  I discussed lab monitoring with patient.  Patient will call with any  side effects including symptoms of infection.     Dermatitis Unspecified  - scattered erythematous papules with a few pinpoint pustules.  Papules have blanching on the back  DDx: ACD vs scabies vs folliculitis vs drug eruption     Plan: Counseling  I counseled the patient regarding the following:  Skin care: Patient instructed to use gentle skin care including dove unscented soap, CeraVe moisturizing cream, and fragrance free laundry detergent.  Expectations: The patient understands that there is not a definitive diagnosis at this time. Further testing or empiric therapy may be necessary to diagnose and improve the condition.  Contact office if: The patient develops a fever, or rash dramatically worsens despite treatment.    - update [place] list  - culture obtained  - Will stop Linzess as this was the most recently started medications  - will treat empirically for scabies    Follow up if symptoms worsen or fail to improve.    Tatum Perry MD

## 2025-01-02 NOTE — TELEPHONE ENCOUNTER
Pt came in as walk in. Will see pt at 13:45 as we had an opening on today.    ----- Message from Eliana sent at 1/2/2025  9:09 AM CST -----  PATIENT CALLED AND REQUESTING A CALL BACK, CALL BACK NUMBER [phone]

## 2025-01-04 LAB — MICROORGANISM SPEC CULT: NORMAL

## 2025-01-13 ENCOUNTER — TELEPHONE (OUTPATIENT)
Dept: FAMILY MEDICINE | Facility: CLINIC | Age: 65
End: 2025-01-13
Payer: MEDICARE

## 2025-01-13 NOTE — TELEPHONE ENCOUNTER
"----- Message from Annelise sent at 1/13/2025  2:12 PM CST -----  Regarding: FW: Pt Call Back  Pt states she has to go into work at 3:00 and stated if a nurse could not call her by then, if we could call first thing in the morning. Explained to pt that we start clinic at 8:00am so the nurse might not be able to call first thing in the morning.  ----- Message -----  From: Annelise Mccray  Sent: 1/13/2025   1:03 PM CST  To: [facility] Staff  Subject: Pt Call Back                                     Pt called during provider lunch break asking to speak with a nurse. Pt asked for a call back from a nurse. When asked what it was regarding, pt stated "about being sick." Pt phone #: [phone]  "

## 2025-01-15 ENCOUNTER — OFFICE VISIT (OUTPATIENT)
Dept: FAMILY MEDICINE | Facility: CLINIC | Age: 65
End: 2025-01-15
Payer: MEDICARE

## 2025-01-15 VITALS
RESPIRATION RATE: 19 BRPM | OXYGEN SATURATION: 100 % | HEIGHT: 67 IN | DIASTOLIC BLOOD PRESSURE: 74 MMHG | SYSTOLIC BLOOD PRESSURE: 108 MMHG | HEART RATE: 73 BPM | TEMPERATURE: 98 F | BODY MASS INDEX: 32.33 KG/M2 | WEIGHT: 206 LBS

## 2025-01-15 DIAGNOSIS — F32.1 MAJOR DEPRESSIVE DISORDER, SINGLE EPISODE, MODERATE: ICD-10-CM

## 2025-01-15 DIAGNOSIS — Z20.828 EXPOSURE TO VIRAL DISEASE: ICD-10-CM

## 2025-01-15 DIAGNOSIS — R09.81 CONGESTION OF NASAL SINUS: Primary | ICD-10-CM

## 2025-01-15 DIAGNOSIS — Z11.52 ENCOUNTER FOR SCREENING FOR COVID-19: ICD-10-CM

## 2025-01-15 DIAGNOSIS — N18.32 STAGE 3B CHRONIC KIDNEY DISEASE: ICD-10-CM

## 2025-01-15 DIAGNOSIS — J02.9 SORE THROAT: ICD-10-CM

## 2025-01-15 LAB
CTP QC/QA: YES
MOLECULAR STREP A: NEGATIVE
POC MOLECULAR INFLUENZA A AGN: NEGATIVE
POC MOLECULAR INFLUENZA B AGN: NEGATIVE
SARS-COV-2 RDRP RESP QL NAA+PROBE: NEGATIVE

## 2025-01-15 PROCEDURE — 96372 THER/PROPH/DIAG INJ SC/IM: CPT | Mod: ,,, | Performed by: FAMILY MEDICINE

## 2025-01-15 PROCEDURE — 3074F SYST BP LT 130 MM HG: CPT | Mod: ,,, | Performed by: FAMILY MEDICINE

## 2025-01-15 PROCEDURE — 1160F RVW MEDS BY RX/DR IN RCRD: CPT | Mod: ,,, | Performed by: FAMILY MEDICINE

## 2025-01-15 PROCEDURE — 87635 SARS-COV-2 COVID-19 AMP PRB: CPT | Mod: QW,,, | Performed by: FAMILY MEDICINE

## 2025-01-15 PROCEDURE — 87651 STREP A DNA AMP PROBE: CPT | Mod: QW,,, | Performed by: FAMILY MEDICINE

## 2025-01-15 PROCEDURE — 3078F DIAST BP <80 MM HG: CPT | Mod: ,,, | Performed by: FAMILY MEDICINE

## 2025-01-15 PROCEDURE — 87502 INFLUENZA DNA AMP PROBE: CPT | Mod: QW,,, | Performed by: FAMILY MEDICINE

## 2025-01-15 PROCEDURE — 99214 OFFICE O/P EST MOD 30 MIN: CPT | Mod: 25,,, | Performed by: FAMILY MEDICINE

## 2025-01-15 PROCEDURE — 3008F BODY MASS INDEX DOCD: CPT | Mod: ,,, | Performed by: FAMILY MEDICINE

## 2025-01-15 PROCEDURE — 1159F MED LIST DOCD IN RCRD: CPT | Mod: ,,, | Performed by: FAMILY MEDICINE

## 2025-01-15 RX ORDER — FLUTICASONE PROPIONATE 50 MCG
2 SPRAY, SUSPENSION (ML) NASAL DAILY
Qty: 16 G | Refills: 5 | Status: SHIPPED | OUTPATIENT
Start: 2025-01-15

## 2025-01-15 RX ORDER — BETAMETHASONE SODIUM PHOSPHATE AND BETAMETHASONE ACETATE 3; 3 MG/ML; MG/ML
6 INJECTION, SUSPENSION INTRA-ARTICULAR; INTRALESIONAL; INTRAMUSCULAR; SOFT TISSUE
Status: COMPLETED | OUTPATIENT
Start: 2025-01-15 | End: 2025-01-15

## 2025-01-15 RX ADMIN — BETAMETHASONE SODIUM PHOSPHATE AND BETAMETHASONE ACETATE 6 MG: 3; 3 INJECTION, SUSPENSION INTRA-ARTICULAR; INTRALESIONAL; INTRAMUSCULAR; SOFT TISSUE at 12:01

## 2025-01-15 NOTE — PROGRESS NOTES
Janene Castillo is a 64 y.o. female seen today for intermittent nasal congestion with postnasal drainage and sore throat.  This last round his lasted for proximally week and a half.  So far she has been afebrile and her flu COVID strep testing were negative today.  We discussed a trial of daily Mucinex as well as Flonase daily.  Patient discontinued her Leqvio due to myalgias and her LDL cholesterol in December was not to goal.  She will work on her diet and we will recheck in 3 months.    Past Medical History:   Diagnosis Date    Allergic contact dermatitis     balsam of peru, bacitracin, fragrance, methyldibromo glutaronite    Anxiety     Chronic pain     HTN (hypertension)     Hyperlipidemia     Plaque psoriasis     Prurigo nodularis     Psoriatic arthritis     Stage 3b chronic kidney disease      Family History   Problem Relation Name Age of Onset    Hypertension Mother      Heart disease Father      Heart attack Father      Heart failure Father      Pacemaker/defibrilator Father      Melanoma Neg Hx       Current Outpatient Medications on File Prior to Visit   Medication Sig Dispense Refill    amLODIPine (NORVASC) 2.5 MG tablet Take 1 tablet (2.5 mg total) by mouth once daily. 90 tablet 1    benazepriL (LOTENSIN) 20 MG tablet Take 1 tablet by mouth once daily 90 tablet 0    buPROPion (WELLBUTRIN) 100 MG tablet Take 1 tablet by mouth twice daily 180 tablet 1    calcipotriene-betamethasone (TACLONEX) ointment Apply topically to affected area twice daily, tapering with improvement. 100 g 1    calcium carbonate (OS-CAIN) 600 mg calcium (1,500 mg) Tab Take 600 mg by mouth once daily.      coenzyme Q10 100 mg capsule as directed Orally      ezetimibe (ZETIA) 10 mg tablet Take 1 tablet (10 mg total) by mouth every evening. 90 tablet 1    famotidine (PEPCID) 20 MG tablet Take 1 tablet by mouth twice daily 180 tablet 0    ivermectin (STROMECTOL) 3 mg Tab Take 6 tablets PO today. Repeat in one week 12 tablet 0    mupirocin  (BACTROBAN) 2 % ointment Apply to affected areas TID 60 g 2    oxyCODONE-acetaminophen (PERCOCET)  mg per tablet Take 1 tablet by mouth 3 (three) times daily as needed.      tiZANidine (ZANAFLEX) 4 MG tablet Take 1 tablet by mouth twice daily 180 tablet 1    upadacitinib (RINVOQ) 30 mg Tb24 Take 1 tablet (30 mg total) by mouth once daily. 30 tablet 2    valACYclovir (VALTREX) 1000 MG tablet Take 2 tablets b.i.d. x1 day as needed for a cold sore 4 tablet 11    varenicline (CHANTIX) 1 mg Tab Take 1 tablet by mouth twice daily 180 tablet 0    zinc gluconate 50 mg tablet Take 50 mg by mouth once daily.      doxycycline (VIBRAMYCIN) 100 MG Cap Take one 100mg capsule PO BID. AVOID TAKING WITH DAIRY PRODUCTS (Patient not taking: Reported on 1/15/2025) 14 capsule 0    dupilumab (DUPIXENT) 300 mg/2 mL Syrg Inject 2 mLs (300 mg total) into the skin every 14 (fourteen) days. (Patient not taking: Reported on 1/15/2025) 4 mL 11    linaCLOtide (LINZESS) 145 mcg Cap capsule Take 1 capsule (145 mcg total) by mouth before breakfast. (Patient not taking: Reported on 1/15/2025) 90 capsule 1    predniSONE (DELTASONE) 10 MG tablet Take 6 tab po daily x1 day, 4 tab po daily x2 days, 3 tabs po daily x3 days, 2 tabs po daily x4 days, 1 tab po daily x5 days (Patient not taking: Reported on 1/15/2025) 36 tablet 0    RINVOQ 15 mg 24 hr tablet Take 15 mg by mouth once daily. (Patient not taking: Reported on 1/15/2025)       Current Facility-Administered Medications on File Prior to Visit   Medication Dose Route Frequency Provider Last Rate Last Admin    inclisiran 284 mg/1.5 mL subcutaneous injection 284 mg  284 mg Subcutaneous Q90 Days Asael Huizar MD         Immunization History   Administered Date(s) Administered    COVID-19 MRNA, LN-S PF (MODERNA HALF 0.25 ML DOSE) 04/26/2022    COVID-19, MRNA, LN-S, PF (MODERNA FULL 0.5 ML DOSE) 06/22/2021, 08/06/2021    COVID-19, mRNA, LNP-S, bivalent booster, PF (Moderna Omicron)12 + YEARS  11/16/2022       Review of Systems   Constitutional:  Negative for fever, malaise/fatigue and weight loss.   HENT:  Positive for congestion.    Respiratory:  Negative for shortness of breath.    Cardiovascular:  Negative for chest pain and palpitations.   Gastrointestinal:  Negative for nausea and vomiting.   Psychiatric/Behavioral:  Negative for depression.         Vitals:    01/15/25 1148   BP: 108/74   Pulse: 73   Resp: 19   Temp: 98.2 °F (36.8 °C)       Physical Exam  Vitals reviewed.   Constitutional:       Appearance: Normal appearance.   HENT:      Head: Normocephalic.      Nose:      Right Turbinates: Swollen.      Left Turbinates: Swollen.      Mouth/Throat:      Pharynx: Posterior oropharyngeal erythema and postnasal drip present. No pharyngeal swelling, oropharyngeal exudate or uvula swelling.   Eyes:      Extraocular Movements: Extraocular movements intact.      Conjunctiva/sclera: Conjunctivae normal.      Pupils: Pupils are equal, round, and reactive to light.   Neck:      Thyroid: No thyroid mass or thyromegaly.   Cardiovascular:      Rate and Rhythm: Normal rate and regular rhythm.      Heart sounds: Normal heart sounds. No murmur heard.     No gallop.   Pulmonary:      Effort: Pulmonary effort is normal. No respiratory distress.      Breath sounds: Normal breath sounds. No wheezing or rales.   Skin:     General: Skin is warm and dry.      Coloration: Skin is not jaundiced or pale.   Neurological:      Mental Status: She is alert.   Psychiatric:         Mood and Affect: Mood normal.         Behavior: Behavior normal.         Thought Content: Thought content normal.         Judgment: Judgment normal.          Assessment and Plan  1. Congestion of nasal sinus  -     betamethasone acetate-betamethasone sodium phosphate injection 6 mg  -     fluticasone propionate (FLONASE) 50 mcg/actuation nasal spray; 2 sprays (100 mcg total) by Each Nostril route once daily.  Dispense: 16 g; Refill: 5    2. Encounter  for screening for COVID-19  -     POCT COVID-19 Rapid Screening    3. Exposure to viral disease  -     POCT Influenza A/B Molecular    4. Sore throat  -     POCT Strep A, Molecular    5. Major depressive disorder, single episode, moderate  Assessment & Plan:  We will continue current medications and monitor for any exacerbation.      6. Stage 3b chronic kidney disease  Assessment & Plan:  We will continue to recommend hydration and avoidance of NSAIDs and monitor renal function               Return to clinic in 2-3 months for follow-up on her lipids.    Health Maintenance Topics with due status: Not Due       Topic Last Completion Date    Colorectal Cancer Screening 12/22/2020    Cervical Cancer Screening 08/03/2023    Annual UACr 10/01/2024    Lipid Panel 12/12/2024

## 2025-01-30 ENCOUNTER — OFFICE VISIT (OUTPATIENT)
Dept: DERMATOLOGY | Facility: CLINIC | Age: 65
End: 2025-01-30
Payer: MEDICARE

## 2025-01-30 VITALS — RESPIRATION RATE: 18 BRPM | WEIGHT: 205.94 LBS | BODY MASS INDEX: 32.32 KG/M2 | HEIGHT: 67 IN

## 2025-01-30 DIAGNOSIS — L30.9 DERMATITIS, UNSPECIFIED: ICD-10-CM

## 2025-01-30 DIAGNOSIS — L28.1 PRURIGO NODULARIS: ICD-10-CM

## 2025-01-30 DIAGNOSIS — B86 SCABIES: ICD-10-CM

## 2025-01-30 DIAGNOSIS — L40.9 PSORIASIS: ICD-10-CM

## 2025-01-30 DIAGNOSIS — L57.8 OTHER SKIN CHANGES DUE TO CHRONIC EXPOSURE TO NONIONIZING RADIATION: Primary | ICD-10-CM

## 2025-01-30 DIAGNOSIS — L23.5 ALLERGIC DERMATITIS DUE TO OTHER CHEMICAL PRODUCT: ICD-10-CM

## 2025-01-30 DIAGNOSIS — L40.50 PSORIATIC ARTHRITIS: ICD-10-CM

## 2025-01-30 NOTE — PROGRESS NOTES
[facility]   Tatum Perry MD    Patient Name: Janene Castillo  Patient YOB: 1960   Date of Service: 25    CC: Follow-up Dermatitis    HPI: Janene Castillo is a 64 y.o. female here today for follow-up of dermatitis, last seen 2025.  Previous treatments include Ivermectin and doxycycline.  Overall, the dermatitis is improved.  Treatment plan was followed as directed.    Past Medical History:   Diagnosis Date    Allergic contact dermatitis     balsam of peru, bacitracin, fragrance, methyldibromo glutaronite    Anxiety     Chronic pain     HTN (hypertension)     Hyperlipidemia     Plaque psoriasis     Prurigo nodularis     Psoriatic arthritis     Stage 3b chronic kidney disease      Past Surgical History:   Procedure Laterality Date    ANGIOGRAM, CORONARY, WITH LEFT HEART CATHETERIZATION N/A 2022    Procedure: Angiogram, Coronary, with Left Heart Cath;  Surgeon: Jason Ratliff MD;  Location: [facility] CATH LAB;  Service: Cardiology;  Laterality: N/A;     SECTION       Review of patient's allergies indicates:   Allergen Reactions    Bacitracin Dermatitis    Codeine sulfate     Codeine Rash     Rash     Methyldibromoglutaronitrile Dermatitis    Permethrin Rash     Rash     Sulfa (sulfonamide antibiotics) Other (See Comments) and Rash     unknown       Current Outpatient Medications:     amLODIPine (NORVASC) 2.5 MG tablet, Take 1 tablet (2.5 mg total) by mouth once daily., Disp: 90 tablet, Rfl: 1    benazepriL (LOTENSIN) 20 MG tablet, Take 1 tablet by mouth once daily, Disp: 90 tablet, Rfl: 0    buPROPion (WELLBUTRIN) 100 MG tablet, Take 1 tablet by mouth twice daily, Disp: 180 tablet, Rfl: 1    calcipotriene-betamethasone (TACLONEX) ointment, Apply topically to affected area twice daily, tapering with improvement., Disp: 100 g, Rfl: 1    calcium carbonate (OS-CAIN) 600 mg calcium (1,500 mg) Tab, Take 600 mg by mouth once daily., Disp: , Rfl:     coenzyme Q10 100 mg capsule,  as directed Orally, Disp: , Rfl:     ezetimibe (ZETIA) 10 mg tablet, Take 1 tablet (10 mg total) by mouth every evening., Disp: 90 tablet, Rfl: 1    famotidine (PEPCID) 20 MG tablet, Take 1 tablet by mouth twice daily, Disp: 180 tablet, Rfl: 0    fluticasone propionate (FLONASE) 50 mcg/actuation nasal spray, 2 sprays (100 mcg total) by Each Nostril route once daily., Disp: 16 g, Rfl: 5    ivermectin (STROMECTOL) 3 mg Tab, Take 6 tablets PO today. Repeat in one week, Disp: 12 tablet, Rfl: 0    mupirocin (BACTROBAN) 2 % ointment, Apply to affected areas TID, Disp: 60 g, Rfl: 2    oxyCODONE-acetaminophen (PERCOCET)  mg per tablet, Take 1 tablet by mouth 3 (three) times daily as needed., Disp: , Rfl:     tiZANidine (ZANAFLEX) 4 MG tablet, Take 1 tablet by mouth twice daily, Disp: 180 tablet, Rfl: 1    upadacitinib (RINVOQ) 30 mg Tb24, Take 1 tablet (30 mg total) by mouth once daily., Disp: 30 tablet, Rfl: 2    valACYclovir (VALTREX) 1000 MG tablet, Take 2 tablets b.i.d. x1 day as needed for a cold sore, Disp: 4 tablet, Rfl: 11    varenicline (CHANTIX) 1 mg Tab, Take 1 tablet by mouth twice daily, Disp: 180 tablet, Rfl: 0    zinc gluconate 50 mg tablet, Take 50 mg by mouth once daily., Disp: , Rfl:     ROS: A focused review of systems was obtained and negative.     Exam: A focused skin exam was performed. All areas examined were normal except as mentioned in the assessment and plan below.  General Appearance of the patient is well developed and well nourished.  Orientation: alert and oriented x 3.  Mood and affect: pleasant.    Assessment:   The primary encounter diagnosis was Other skin changes due to chronic exposure to nonionizing radiation. Diagnoses of Psoriasis, Dermatitis, unspecified, Allergic dermatitis due to other chemical product, Psoriatic arthritis, Prurigo nodularis, and Scabies were also pertinent to this visit.    Plan:         Dermatitis Unspecified  - resolving  DDx: ACD vs scabies vs  folliculitis vs drug eruption      Plan: Counseling  I counseled the patient regarding the following:  Skin care: Patient instructed to use gentle skin care including dove unscented soap, CeraVe moisturizing cream, and fragrance free laundry detergent.  Expectations: The patient understands that there is not a definitive diagnosis at this time. Further testing or empiric therapy may be necessary to diagnose and improve the condition.  Contact office if: The patient develops a fever, or rash dramatically worsens despite treatment.     - update camp list per Dr. Novoa's office  Plan: Discussion of Management with External Provider: Dr. Novoa  Discussion Details: asked her to reach out to the patient to instruct how to update CAMP list    - Pt discontinued Linzess  - treated empirically for scabies    Allergic Contact Dermatitis (L23.89)  - Well demarcated, geometric eczematous patches  Status: stable     Plan: Counseling.  I counseled the patient regarding the following:  Skin care: Patient should use hypoallergenic products such as unscented soaps. Eliminate exposure to all new  cosmetics, fragrances, hair products, nail products shampoos, scented soaps, plants, metals and sunscreens.  Expectations: Allergic contact dermatitis can persist for several weeks before it fully resolves. Sometimes, patch  testing is necessary if reactions persist or if the patient is in contact with several potential allergens.  Contact office if: Allergic contact dermatitis worsens or fails to improve despite several weeks of treatment.  - continue to follow [place] list   - Will refer to Dr. Novoa for second opinion if she is unable to update her neris for new CAMP list     Plaque Psoriasis  - clear  Status: stable     Plan: Counseling  I counseled the patient regarding the following:  Skin care: Emollients, ambient sun exposure, shampoos with tar, selenium or zinc pyrithione can improve psoriasis.  Expectations: Psoriasis is chronic in  nature with periods of remissions and flares. Flares can be triggered by stress, infections (group A strep), certain medications and alcohol.  Contact office if: Psoriasis worsens, or fails to improve despite several months of treatment.     - continue rinvoq     Psoriatic Arthritis  - Psoriasiform plaques with micaceous scale  Status: stable     Plan: Counseling.  I counseled the patient regarding the following:  Instructions: Systemic medications are the treatment of choice for psoriatic arthritis. Treatment options include  anti-TNF therapy and methotrexate.  Expectations: Psoriatic arthritis is a type of inflammatory arthritis which occurs in conjunction with psoriasis.  Approximately 5% of psoriasis patients develop psoriatic arthritis. Psoriatic arthritis is chronic in nature with  periods of remissions and flares. Flares can be triggered by stress, infections (group A strep), certain medications  and alcohol. Psoriatic arthritis requires systemic medication for adequate treatment.  Contact office if: Your psoriatic arthritis worsens, or fails to improve despite several months of treatment.     - continue rinvoq     Prurigo Nodularis  - erythematous nodules with central erosions located on the trunk and bilateral extremities  Severity: Moderate   Estimated nodule count: 20     Plan: Counseling  I counseled the patient regarding the following:  Skin care: Recommend trimming nails short, anti-itch lotions such as Sarna, topical steroids and antihistamines.  Expectations: Prurigo Nodularis is a self-inflicted lesion that results from picking or rubbing the same spot of skin over and over again. If the itch-scratch cycle is broken, the lesions will resolve.  Contact office if: Prurigo Nodularis worsens, or if itching is accompanied by constitutional symptoms.     - continue rinvoq     Scabies (B86)  - clear  Status: Improved  Plan: Counseling.  I counseled the patient regarding the following:  Contact office if:  Scabies fails to resolve after several weeks of treatment.      High Risk Medication Monitoring (Z79.899) : The risks and benefits of the medication were reviewed in full with the patient. Should any side effects occur, the patient will stop the medication and contact me immediately.     Rinvoq counseling: I discussed with patient potential side effects including lowered immune system, infections, cardiovascular risk including heart attack and stroke, allergic reactions, blot clots, cancer, and tears in the stomach or intestines.  I discussed lab monitoring with patient.  Patient will call with any side effects including symptoms of infection.     Follow up in about 6 months (around 7/30/2025).    Tatum Perry MD

## 2025-02-11 DIAGNOSIS — I10 HYPERTENSION, UNSPECIFIED TYPE: ICD-10-CM

## 2025-02-11 RX ORDER — BENAZEPRIL HYDROCHLORIDE 20 MG/1
20 TABLET ORAL
Qty: 90 TABLET | Refills: 0 | Status: SHIPPED | OUTPATIENT
Start: 2025-02-11

## 2025-02-12 DIAGNOSIS — E78.5 HYPERLIPIDEMIA, UNSPECIFIED HYPERLIPIDEMIA TYPE: ICD-10-CM

## 2025-02-12 RX ORDER — EZETIMIBE 10 MG/1
10 TABLET ORAL NIGHTLY
Qty: 90 TABLET | Refills: 1 | Status: SHIPPED | OUTPATIENT
Start: 2025-02-12

## 2025-02-15 DIAGNOSIS — F17.200 SMOKER: ICD-10-CM

## 2025-02-17 RX ORDER — VARENICLINE TARTRATE 1 MG/1
1 TABLET, FILM COATED ORAL 2 TIMES DAILY
Qty: 180 TABLET | Refills: 0 | Status: SHIPPED | OUTPATIENT
Start: 2025-02-17

## 2025-03-03 ENCOUNTER — OFFICE VISIT (OUTPATIENT)
Dept: FAMILY MEDICINE | Facility: CLINIC | Age: 65
End: 2025-03-03
Payer: MEDICARE

## 2025-03-03 ENCOUNTER — RESULTS FOLLOW-UP (OUTPATIENT)
Dept: FAMILY MEDICINE | Facility: CLINIC | Age: 65
End: 2025-03-03
Payer: MEDICARE

## 2025-03-03 VITALS
SYSTOLIC BLOOD PRESSURE: 109 MMHG | RESPIRATION RATE: 15 BRPM | DIASTOLIC BLOOD PRESSURE: 73 MMHG | HEART RATE: 69 BPM | BODY MASS INDEX: 31.71 KG/M2 | OXYGEN SATURATION: 99 % | TEMPERATURE: 98 F | HEIGHT: 67 IN | WEIGHT: 202 LBS

## 2025-03-03 DIAGNOSIS — S69.91XA INJURY OF RIGHT HAND, INITIAL ENCOUNTER: Primary | ICD-10-CM

## 2025-03-03 RX ORDER — KETOROLAC TROMETHAMINE 30 MG/ML
60 INJECTION, SOLUTION INTRAMUSCULAR; INTRAVENOUS
Status: COMPLETED | OUTPATIENT
Start: 2025-03-03 | End: 2025-03-03

## 2025-03-03 RX ORDER — UPADACITINIB 30 MG/1
1 TABLET, EXTENDED RELEASE ORAL DAILY
COMMUNITY
Start: 2025-02-11

## 2025-03-03 RX ADMIN — KETOROLAC TROMETHAMINE 60 MG: 30 INJECTION, SOLUTION INTRAMUSCULAR; INTRAVENOUS at 09:03

## 2025-03-03 NOTE — TELEPHONE ENCOUNTER
Patient notified that Mariah Ellis FNP stated the Xray showed Mild osteoarthritis noted in R hand but no fractures , patient verbalized understanding.

## 2025-03-03 NOTE — TELEPHONE ENCOUNTER
----- Message from JANET Ortiz sent at 3/3/2025 11:15 AM CST -----  Mild osteoarthritis noted in R hand but no fractures  ----- Message -----  From: Interface, Rad Results In  Sent: 3/3/2025  10:54 AM CST  To: JANET Franklin

## 2025-03-03 NOTE — PROGRESS NOTES
[facility]    Chief Complaint      Chief Complaint   Patient presents with    Pain     Right hand, near thumb , patient reports she hit her hand 4 weeks ago ( patient can not remember what she hit her hand on ) and states she is still having pain in that area.     She also reports a sore on her arm that will not heal ( right arm near elbow)         History of Present Illness      Janene Castillo is a 64 y.o. female. She  has a past medical history of Allergic contact dermatitis, Anxiety, Chronic pain, HTN (hypertension), Hyperlipidemia, Plaque psoriasis, Prurigo nodularis, Psoriatic arthritis, and Stage 3b chronic kidney disease., who presents today for right hand pain for four weeks. States she hit it on something but can not remember what she hit it on. Patient states that she will schedule an appointment with dermatology for her arm.     Past Medical History:  Past Medical History:   Diagnosis Date    Allergic contact dermatitis     balsam of peru, bacitracin, fragrance, methyldibromo glutaronite    Anxiety     Chronic pain     HTN (hypertension)     Hyperlipidemia     Plaque psoriasis     Prurigo nodularis     Psoriatic arthritis     Stage 3b chronic kidney disease        Past Surgical History:   has a past surgical history that includes  section and ANGIOGRAM, CORONARY, WITH LEFT HEART CATHETERIZATION (N/A, 2022).    Social History:  Social History[1]    I personally reviewed all past medical, surgical, and social.     Review of Systems   Constitutional:  Negative for fatigue.   HENT:  Negative for ear pain and sore throat.    Eyes:  Negative for pain, discharge and visual disturbance.   Respiratory:  Negative for cough and shortness of breath.    Cardiovascular: Negative.    Gastrointestinal:  Negative for abdominal pain, constipation and diarrhea.   Musculoskeletal:  Positive for arthralgias. Negative for gait problem.   Neurological:  Negative for dizziness and light-headedness.     "    Medications:  Encounter Medications[2]    Allergies:  Review of patient's allergies indicates:   Allergen Reactions    Bacitracin Dermatitis    Codeine sulfate     Codeine Rash     Rash     Methyldibromoglutaronitrile Dermatitis    Permethrin Rash     Rash     Sulfa (sulfonamide antibiotics) Other (See Comments) and Rash     unknown       Health Maintenance:  Immunization History   Administered Date(s) Administered    COVID-19 MRNA, LN-S PF (MODERNA HALF 0.25 ML DOSE) 04/26/2022    COVID-19, MRNA, LN-S, PF (MODERNA FULL 0.5 ML DOSE) 06/22/2021, 08/06/2021    COVID-19, mRNA, LNP-S, bivalent booster, PF (Moderna Omicron)12 + YEARS 11/16/2022      Health Maintenance   Topic Date Due    HIV Screening  Never done    TETANUS VACCINE  Never done    Hemoglobin A1c (Diabetic Prevention Screening)  Never done    Shingles Vaccine (1 of 2) Never done    Pneumococcal Vaccines (Age 50+) (1 of 1 - PCV) Never done    RSV Vaccine (Age 60+ and Pregnant patients) (1 - Risk 60-74 years 1-dose series) Never done    Mammogram  08/28/2024    Influenza Vaccine (1) Never done    COVID-19 Vaccine (5 - 2024-25 season) 09/01/2024    Annual UACr  10/01/2025    Cervical Cancer Screening  08/03/2028    Lipid Panel  12/12/2029    Colorectal Cancer Screening  12/22/2030    Hepatitis C Screening  Completed        Physical Exam      Vital Signs  Temp: 97.6 °F (36.4 °C)  Temp Source: Oral  Pulse: 69  Resp: 15  SpO2: 99 %  BP: 109/73  BP Location: Right arm  Patient Position: Sitting  Pain Score:   5  Pain Loc: Hand  Height and Weight  Height: 5' 7" (170.2 cm)  Weight: 91.6 kg (202 lb)  BSA (Calculated - sq m): 2.08 sq meters  BMI (Calculated): 31.6  Weight in (lb) to have BMI = 25: 159.3]    Physical Exam  Vitals and nursing note reviewed.   Constitutional:       Appearance: Normal appearance. She is well-developed.   HENT:      Head: Normocephalic.      Right Ear: Hearing normal.      Left Ear: Hearing normal.      Nose: Nose normal.   Eyes:      " General: Lids are normal.      Conjunctiva/sclera: Conjunctivae normal.   Cardiovascular:      Rate and Rhythm: Normal rate and regular rhythm.      Heart sounds: Normal heart sounds.   Pulmonary:      Effort: Pulmonary effort is normal.      Breath sounds: Normal breath sounds.   Musculoskeletal:         General: Tenderness and signs of injury present. No deformity.      Right hand: Tenderness present.      Cervical back: Normal range of motion and neck supple.      Right lower leg: No edema.      Left lower leg: No edema.   Skin:     General: Skin is warm and dry.   Neurological:      Mental Status: She is alert and oriented to person, place, and time.      Gait: Gait is intact.   Psychiatric:         Behavior: Behavior is cooperative.          Laboratory:  CBC:  Recent Labs   Lab 01/05/24  1019 07/02/24  0857 12/12/24  0715   WBC 3.47 L 3.48 L 5.03   RBC 3.71 L 3.68 L 3.92 L   Hemoglobin 10.9 L 10.8 L 11.3 L   Hematocrit 34.2 L 36.0 L 36.6 L   Platelet Count 281 263 322   MCV 92.2 97.8 H 93.4   MCH 29.4 29.3 28.8   MCHC 31.9 L 30.0 L 30.9 L     CMP:  Recent Labs   Lab 01/05/24  1019 07/02/24  0857 12/12/24  0715   Glucose 93 82 94   Calcium 9.3 9.0 9.2   Albumin 3.8 3.9 4.0   Total Protein 7.0 6.6 6.9   Sodium 140 142 138   Potassium 5.5 H 5.2 H 5.4 H   CO2 28 28 25   Chloride 110 H 110 H 107   BUN 21 H 19 H 20   Alk Phos 116 122 138   ALT 28 24 17   AST 16 23 35 H   Bilirubin, Total 0.4 0.4 0.3     LIPIDS:  Recent Labs   Lab 01/05/24  1019 07/02/24  0857 12/12/24  0715   HDL Cholesterol 65 H 56 56   Cholesterol 181 185 165   Triglycerides 85 107 105   LDL Calculated 99 108 88   Cholesterol/HDL Ratio (Risk Factor) 2.8 3.3 2.9   Non- 129 109     TSH:      A1C:        Assessment/Plan     Janene Castilol is a 64 y.o.female with:     1. Injury of right hand, initial encounter  -     X-Ray Hand 3 view Right; Future; Expected date: 03/03/2025  -     ketorolac injection 60 mg         Schedule follow up appointment  with dermatology.        Total time spent face-to-face and non-face-to-face coordinating care for this encounter was: 20 minutes     Chronic conditions status updated as per HPI.  Other than changes above, cont current medications and maintain follow up with specialists.  Return to clinic prn if symptoms worsen or fail to improve.    Mariah Ellis, FNP  [facility]         [1]   Social History  Tobacco Use    Smoking status: Former     Current packs/day: 1.00     Average packs/day: 1 pack/day for 44.0 years (44.0 ttl pk-yrs)     Types: Cigarettes     Passive exposure: Past    Smokeless tobacco: Never   Substance Use Topics    Alcohol use: Not Currently    Drug use: Never   [2]   Outpatient Encounter Medications as of 3/3/2025   Medication Sig Dispense Refill    amLODIPine (NORVASC) 2.5 MG tablet Take 1 tablet (2.5 mg total) by mouth once daily. 90 tablet 1    benazepriL (LOTENSIN) 20 MG tablet Take 1 tablet by mouth once daily 90 tablet 0    buPROPion (WELLBUTRIN) 100 MG tablet Take 1 tablet by mouth twice daily 180 tablet 1    calcipotriene-betamethasone (TACLONEX) ointment Apply topically to affected area twice daily, tapering with improvement. 100 g 1    calcium carbonate (OS-CAIN) 600 mg calcium (1,500 mg) Tab Take 600 mg by mouth once daily.      coenzyme Q10 100 mg capsule as directed Orally      ezetimibe (ZETIA) 10 mg tablet TAKE 1 TABLET BY MOUTH ONCE DAILY IN THE EVENING 90 tablet 1    famotidine (PEPCID) 20 MG tablet Take 1 tablet by mouth twice daily 180 tablet 0    mupirocin (BACTROBAN) 2 % ointment Apply to affected areas TID 60 g 2    oxyCODONE-acetaminophen (PERCOCET)  mg per tablet Take 1 tablet by mouth 3 (three) times daily as needed.      RINVOQ 30 mg Tb24 Take 1 tablet by mouth once daily.      tiZANidine (ZANAFLEX) 4 MG tablet Take 1 tablet by mouth twice daily 180 tablet 1    valACYclovir (VALTREX) 1000 MG tablet Take 2 tablets b.i.d. x1 day as needed for a cold sore 4 tablet 11     varenicline tartrate (CHANTIX) 1 mg Tab Take 1 tablet by mouth twice daily 180 tablet 0    zinc gluconate 50 mg tablet Take 50 mg by mouth once daily.      fluticasone propionate (FLONASE) 50 mcg/actuation nasal spray 2 sprays (100 mcg total) by Each Nostril route once daily. (Patient not taking: Reported on 3/3/2025) 16 g 5    ivermectin (STROMECTOL) 3 mg Tab Take 6 tablets PO today. Repeat in one week (Patient not taking: Reported on 3/3/2025) 12 tablet 0    [] ketorolac injection 60 mg        No facility-administered encounter medications on file as of 3/3/2025.

## 2025-03-06 ENCOUNTER — OFFICE VISIT (OUTPATIENT)
Dept: DERMATOLOGY | Facility: CLINIC | Age: 65
End: 2025-03-06
Payer: MEDICARE

## 2025-03-06 VITALS — RESPIRATION RATE: 18 BRPM | BODY MASS INDEX: 31.7 KG/M2 | WEIGHT: 201.94 LBS | HEIGHT: 67 IN

## 2025-03-06 DIAGNOSIS — L28.1 PRURIGO NODULARIS: Primary | ICD-10-CM

## 2025-03-06 DIAGNOSIS — L08.9 SKIN INFECTION: ICD-10-CM

## 2025-03-06 PROCEDURE — 87186 SC STD MICRODIL/AGAR DIL: CPT | Mod: ,,, | Performed by: CLINICAL MEDICAL LABORATORY

## 2025-03-06 PROCEDURE — 87070 CULTURE OTHR SPECIMN AEROBIC: CPT | Mod: ,,, | Performed by: CLINICAL MEDICAL LABORATORY

## 2025-03-08 LAB — MICROORGANISM SPEC CULT: ABNORMAL

## 2025-03-10 ENCOUNTER — RESULTS FOLLOW-UP (OUTPATIENT)
Dept: DERMATOLOGY | Facility: CLINIC | Age: 65
End: 2025-03-10

## 2025-03-18 ENCOUNTER — TELEPHONE (OUTPATIENT)
Dept: DERMATOLOGY | Facility: CLINIC | Age: 65
End: 2025-03-18
Payer: MEDICARE

## 2025-03-18 NOTE — TELEPHONE ENCOUNTER
----- Message from Carmencita sent at 3/17/2025 12:56 PM CDT -----  Who Called: Janene Magana Method of Contact: Phone CallPatient's Preferred Phone Number on File: [phone] Additional Information: pt needs a call back from Dr. Perry's nurse. Patient would not say what the reason was would like to tell the nurse herself. Please contact pt for more info

## 2025-03-28 NOTE — PROGRESS NOTES
[facility]   Tatum Perry MD    Patient Name: Janene Castillo  Patient YOB: 1960   Date of Service: 3/6/25    CC: Lesion    HPI: Janene Castillo is a 64 y.o. female here today for lesion, located on the right forearm.  Lesion  has been present for 2 months.  Previous treatments include Mupirocin.      Past Medical History:   Diagnosis Date    Allergic contact dermatitis     balsam of peru, bacitracin, fragrance, methyldibromo glutaronite    Anxiety     Chronic pain     HTN (hypertension)     Hyperlipidemia     Plaque psoriasis     Prurigo nodularis     Psoriatic arthritis     Stage 3b chronic kidney disease      Past Surgical History:   Procedure Laterality Date    ANGIOGRAM, CORONARY, WITH LEFT HEART CATHETERIZATION N/A 2022    Procedure: Angiogram, Coronary, with Left Heart Cath;  Surgeon: Jason Ratliff MD;  Location: [facility] CATH LAB;  Service: Cardiology;  Laterality: N/A;     SECTION       Review of patient's allergies indicates:   Allergen Reactions    Bacitracin Dermatitis    Codeine sulfate     Codeine Rash     Rash     Methyldibromoglutaronitrile Dermatitis    Permethrin Rash     Rash     Sulfa (sulfonamide antibiotics) Other (See Comments) and Rash     unknown     Current Medications[1]    ROS: A focused review of systems was obtained and negative.     Exam: A focused skin exam was performed. All areas examined were normal except as mentioned in the assessment and plan below.  General Appearance of the patient is well developed and well nourished.  Orientation: alert and oriented x 3.  Mood and affect: pleasant.    Assessment:   The primary encounter diagnosis was Prurigo nodularis. A diagnosis of Skin infection was also pertinent to this visit.    Plan:   Medications Ordered This Encounter   Medications    triamcinolone acetonide injection 1 mg     Prurigo Nodularis  - erythematous nodules with central erosions located on the right forearm  Severity:  Referral pended for [facility]. Their phone number is [phone]  Last office visit 2/19/25     mild  Estimated nodule count: 1    Plan: Counseling  I counseled the patient regarding the following:  Skin care: Recommend trimming nails short, anti-itch lotions such as Sarna, topical steroids and antihistamines.  Expectations: Prurigo Nodularis is a self-inflicted lesion that results from picking or rubbing the same spot of skin over and over again. If the itch-scratch cycle is broken, the lesions will resolve.  Contact office if: Prurigo Nodularis worsens, or if itching is accompanied by constitutional symptoms.  Plan: Intralesional Kenalog    Treatment Number: 1  Lesions Injected: 1  Medication Injected: 10 mg/cc of Kenalog  Total Volume Injected: 0.1 cc  Lot Number: [id number]  Expiration Date: 03/2026  NDC #: 9114-0330-45  Administered by: Tatum Perry    The risks of atrophy were reviewed with the patient.      Skin Infection, NOS   - crusting of PN    Plan: Counseling  I counseled the patient regarding the following:  Skin care: Patients with purulence or fluid collections should have their wounds re-opened, drained, cultured and irrigated. All wound infections should be treated with antibiotics.  Expectations: Wound Infections usually occur 4-7 days postoperatively. Patients exhibit, pain, rednes, swelling, cellulitic changes and fever.  Contact Office if: Wound Infection fails to respond to treatment or worsens, patient develops a fever, or if redness spreads despite antibiotics.    A bacterial culture was obtained from the right forearm      Follow up if symptoms worsen or fail to improve.    Tatum Perry MD         [1]   Current Outpatient Medications:     amLODIPine (NORVASC) 2.5 MG tablet, Take 1 tablet (2.5 mg total) by mouth once daily., Disp: 90 tablet, Rfl: 1    benazepriL (LOTENSIN) 20 MG tablet, Take 1 tablet by mouth once daily, Disp: 90 tablet, Rfl: 0    buPROPion (WELLBUTRIN) 100 MG tablet, Take 1 tablet by mouth twice daily, Disp: 180 tablet, Rfl: 1    calcipotriene-betamethasone (TACLONEX)  ointment, Apply topically to affected area twice daily, tapering with improvement., Disp: 100 g, Rfl: 1    calcium carbonate (OS-CAIN) 600 mg calcium (1,500 mg) Tab, Take 600 mg by mouth once daily., Disp: , Rfl:     coenzyme Q10 100 mg capsule, as directed Orally, Disp: , Rfl:     ezetimibe (ZETIA) 10 mg tablet, TAKE 1 TABLET BY MOUTH ONCE DAILY IN THE EVENING, Disp: 90 tablet, Rfl: 1    famotidine (PEPCID) 20 MG tablet, Take 1 tablet by mouth twice daily, Disp: 180 tablet, Rfl: 0    fluticasone propionate (FLONASE) 50 mcg/actuation nasal spray, 2 sprays (100 mcg total) by Each Nostril route once daily. (Patient not taking: Reported on 3/3/2025), Disp: 16 g, Rfl: 5    ivermectin (STROMECTOL) 3 mg Tab, Take 6 tablets PO today. Repeat in one week (Patient not taking: Reported on 3/3/2025), Disp: 12 tablet, Rfl: 0    mupirocin (BACTROBAN) 2 % ointment, Apply to affected areas TID, Disp: 60 g, Rfl: 2    oxyCODONE-acetaminophen (PERCOCET)  mg per tablet, Take 1 tablet by mouth 3 (three) times daily as needed., Disp: , Rfl:     RINVOQ 30 mg Tb24, Take 1 tablet by mouth once daily., Disp: , Rfl:     tiZANidine (ZANAFLEX) 4 MG tablet, Take 1 tablet by mouth twice daily, Disp: 180 tablet, Rfl: 1    valACYclovir (VALTREX) 1000 MG tablet, Take 2 tablets b.i.d. x1 day as needed for a cold sore, Disp: 4 tablet, Rfl: 11    varenicline tartrate (CHANTIX) 1 mg Tab, Take 1 tablet by mouth twice daily, Disp: 180 tablet, Rfl: 0    zinc gluconate 50 mg tablet, Take 50 mg by mouth once daily., Disp: , Rfl:   No current facility-administered medications for this visit.

## 2025-04-17 ENCOUNTER — TELEPHONE (OUTPATIENT)
Dept: DERMATOLOGY | Facility: CLINIC | Age: 65
End: 2025-04-17
Payer: MEDICARE

## 2025-04-17 NOTE — TELEPHONE ENCOUNTER
Returned call to pt, pt states her face is bleeding and needs another appt due to being tired working till 11 p.m. last night so she was not able to make her appt this morning (04/17/2025 @ 8:45 a.m.). Made an appt for her on 04/23/2025 @10:45 a.m and will add her to wait list for a sooner appt.     ----- Message from Xiomy sent at 4/17/2025 12:04 PM CDT -----  Regarding: MISSED APPOINTMENT  Who Called: Janene Cornejo Left Message for Patient:Does the patient know what this is regarding?:YESPreferred Method of Contact: Phone CallPatient's Preferred Phone Number on File: [phone] Best Call Back Number, if different:Additional Information: PATIENT MISSED HER APPOINTMENT THIS MORNING AND WANTS TO SEE IF SHE COULD POSSIBLY GET IN THIS AFTERNOON. SHE SAYS SHE HAS AN INFECTION IN HER FACE AND HER FACE IS BLEEDING RIGHT NOW.

## 2025-04-23 ENCOUNTER — OFFICE VISIT (OUTPATIENT)
Dept: DERMATOLOGY | Facility: CLINIC | Age: 65
End: 2025-04-23
Payer: MEDICARE

## 2025-04-23 VITALS — WEIGHT: 201.94 LBS | HEIGHT: 67 IN | BODY MASS INDEX: 31.7 KG/M2

## 2025-04-23 DIAGNOSIS — L08.9 SKIN INFECTION: ICD-10-CM

## 2025-04-23 DIAGNOSIS — L23.5 ALLERGIC DERMATITIS DUE TO OTHER CHEMICAL PRODUCT: ICD-10-CM

## 2025-04-23 DIAGNOSIS — L40.9 PSORIASIS: ICD-10-CM

## 2025-04-23 DIAGNOSIS — L40.50 PSORIATIC ARTHRITIS: ICD-10-CM

## 2025-04-23 DIAGNOSIS — L28.1 PRURIGO NODULARIS: Primary | ICD-10-CM

## 2025-04-23 DIAGNOSIS — Z79.899 HIGH RISK MEDICATION USE: ICD-10-CM

## 2025-04-23 PROCEDURE — 87070 CULTURE OTHR SPECIMN AEROBIC: CPT | Mod: ,,, | Performed by: CLINICAL MEDICAL LABORATORY

## 2025-04-23 NOTE — PROGRESS NOTES
[facility]   Tatum Perry MD    Patient Name: Janene Castillo  Patient YOB: 1960   Date of Service: 25    CC: Follow-up Dermatitis    HPI: Janene Castillo is a 65 y.o. female here today for follow-up of Dermatitis, last seen 3/6/2025.  Previous treatments include Mupirocin, TAC, Rinvoq.  Overall, the dermatitis is stable.  Treatment plan was followed as directed.    Past Medical History:   Diagnosis Date    Allergic contact dermatitis     balsam of peru, bacitracin, fragrance, methyldibromo glutaronite    Anxiety     Chronic pain     HTN (hypertension)     Hyperlipidemia     Plaque psoriasis     Prurigo nodularis     Psoriatic arthritis     Stage 3b chronic kidney disease      Past Surgical History:   Procedure Laterality Date    ANGIOGRAM, CORONARY, WITH LEFT HEART CATHETERIZATION N/A 2022    Procedure: Angiogram, Coronary, with Left Heart Cath;  Surgeon: Jason Ratliff MD;  Location: [facility];  Service: Cardiology;  Laterality: N/A;     SECTION       Review of patient's allergies indicates:   Allergen Reactions    Bacitracin Dermatitis    Codeine sulfate     Codeine Rash     Rash     Methyldibromoglutaronitrile Dermatitis    Permethrin Rash     Rash     Sulfa (sulfonamide antibiotics) Other (See Comments) and Rash     unknown     Current Medications[1]    ROS: A focused review of systems was obtained and negative.     Exam: A focused skin exam was performed. All areas examined were normal except as mentioned in the assessment and plan below.  General Appearance of the patient is well developed and well nourished.  Orientation: alert and oriented x 3.  Mood and affect: pleasant.    Assessment:   The primary encounter diagnosis was Prurigo nodularis. Diagnoses of Skin infection, Psoriasis, Allergic dermatitis due to other chemical product, Psoriatic arthritis, and High risk medication use were also pertinent to this visit.    Plan:      Allergic Contact  Dermatitis (L23.89)  - Well demarcated, geometric eczematous patches  Status: stable     Plan: Counseling.  I counseled the patient regarding the following:  Skin care: Patient should use hypoallergenic products such as unscented soaps. Eliminate exposure to all new  cosmetics, fragrances, hair products, nail products shampoos, scented soaps, plants, metals and sunscreens.  Expectations: Allergic contact dermatitis can persist for several weeks before it fully resolves. Sometimes, patch  testing is necessary if reactions persist or if the patient is in contact with several potential allergens.  Contact office if: Allergic contact dermatitis worsens or fails to improve despite several weeks of treatment.    - continue to follow CAMP list   - Appt scheduled w/ Dr. Novoa on 7/17@9:15 to re-evaluate      Plaque Psoriasis  - clear  Status: stable     Plan: Counseling  I counseled the patient regarding the following:  Skin care: Emollients, ambient sun exposure, shampoos with tar, selenium or zinc pyrithione can improve psoriasis.  Expectations: Psoriasis is chronic in nature with periods of remissions and flares. Flares can be triggered by stress, infections (group A strep), certain medications and alcohol.  Contact office if: Psoriasis worsens, or fails to improve despite several months of treatment.     - continue rinvoq     Psoriatic Arthritis  - Psoriasiform plaques with micaceous scale  Status: stable     Plan: Counseling.  I counseled the patient regarding the following:  Instructions: Systemic medications are the treatment of choice for psoriatic arthritis. Treatment options include  anti-TNF therapy and methotrexate.  Expectations: Psoriatic arthritis is a type of inflammatory arthritis which occurs in conjunction with psoriasis.  Approximately 5% of psoriasis patients develop psoriatic arthritis. Psoriatic arthritis is chronic in nature with  periods of remissions and flares. Flares can be triggered by stress,  infections (group A strep), certain medications  and alcohol. Psoriatic arthritis requires systemic medication for adequate treatment.  Contact office if: Your psoriatic arthritis worsens, or fails to improve despite several months of treatment.     - continue rinvoq     Prurigo Nodularis  - erythematous nodules with central erosions located on the trunk and bilateral extremities  Severity: Moderate   Estimated nodule count: 20     Plan: Counseling  I counseled the patient regarding the following:  Skin care: Recommend trimming nails short, anti-itch lotions such as Sarna, topical steroids and antihistamines.  Expectations: Prurigo Nodularis is a self-inflicted lesion that results from picking or rubbing the same spot of skin over and over again. If the itch-scratch cycle is broken, the lesions will resolve.  Contact office if: Prurigo Nodularis worsens, or if itching is accompanied by constitutional symptoms.     - continue rinvoq    Dermatitis Unspecified  - resolving  DDx: ACD vs scabies vs folliculitis vs drug eruption      Plan: Counseling  I counseled the patient regarding the following:  Skin care: Patient instructed to use gentle skin care including dove unscented soap, CeraVe moisturizing cream, and fragrance free laundry detergent.  Expectations: The patient understands that there is not a definitive diagnosis at this time. Further testing or empiric therapy may be necessary to diagnose and improve the condition.  Contact office if: The patient develops a fever, or rash dramatically worsens despite treatment.     - update camp list per Dr. Novoa's office  Plan: Discussion of Management with External Provider: Dr. Novoa  Discussion Details: asked her to reach out to the patient to instruct how to update CAMP list     - Pt discontinued Linzess  - treated empirically for scabies    Skin Infection, NOS   - crusted papules on the face     Plan: Counseling  I counseled the patient regarding the  following:  Skin care: Patients with purulence or fluid collections should have their wounds re-opened, drained, cultured and irrigated. All wound infections should be treated with antibiotics.  Expectations: Wound Infections usually occur 4-7 days postoperatively. Patients exhibit, pain, rednes, swelling, cellulitic changes and fever.  Contact Office if: Wound Infection fails to respond to treatment or worsens, patient develops a fever, or if redness spreads despite antibiotics.    A bacterial culture was obtained from the face    High Risk Medication Monitoring (Z79.899) : The risks and benefits of the medication were reviewed in full with the patient. Should any side effects occur, the patient will stop the medication and contact me immediately.    Rinvoq counseling: I discussed with patient potential side effects including lowered immune system, infections, cardiovascular risk including heart attack and stroke, allergic reactions, blot clots, cancer, and tears in the stomach or intestines.  I discussed lab monitoring with patient.  Patient will call with any side effects including symptoms of infection.     - recent CBC and other labs reviewed.  H/H at baseline but WBC count lower after starting rinvoq.  Will recheck CBC today      Follow up in about 6 months (around 10/23/2025).    Tatum Perry MD         [1]   Current Outpatient Medications:     amLODIPine (NORVASC) 2.5 MG tablet, Take 1 tablet (2.5 mg total) by mouth once daily., Disp: 90 tablet, Rfl: 1    benazepriL (LOTENSIN) 20 MG tablet, Take 1 tablet by mouth once daily, Disp: 90 tablet, Rfl: 0    buPROPion (WELLBUTRIN) 100 MG tablet, Take 1 tablet by mouth twice daily, Disp: 180 tablet, Rfl: 1    calcipotriene-betamethasone (TACLONEX) ointment, Apply topically to affected area twice daily, tapering with improvement., Disp: 100 g, Rfl: 1    calcium carbonate (OS-CAIN) 600 mg calcium (1,500 mg) Tab, Take 600 mg by mouth once daily., Disp: , Rfl:     coenzyme  Q10 100 mg capsule, as directed Orally, Disp: , Rfl:     ezetimibe (ZETIA) 10 mg tablet, TAKE 1 TABLET BY MOUTH ONCE DAILY IN THE EVENING, Disp: 90 tablet, Rfl: 1    famotidine (PEPCID) 20 MG tablet, Take 1 tablet by mouth twice daily, Disp: 180 tablet, Rfl: 0    fluticasone propionate (FLONASE) 50 mcg/actuation nasal spray, 2 sprays (100 mcg total) by Each Nostril route once daily. (Patient not taking: Reported on 3/3/2025), Disp: 16 g, Rfl: 5    ivermectin (STROMECTOL) 3 mg Tab, Take 6 tablets PO today. Repeat in one week (Patient not taking: Reported on 3/3/2025), Disp: 12 tablet, Rfl: 0    mupirocin (BACTROBAN) 2 % ointment, Apply to affected areas TID, Disp: 60 g, Rfl: 2    oxyCODONE-acetaminophen (PERCOCET)  mg per tablet, Take 1 tablet by mouth 3 (three) times daily as needed., Disp: , Rfl:     RINVOQ 30 mg Tb24, Take 1 tablet by mouth once daily., Disp: , Rfl:     tiZANidine (ZANAFLEX) 4 MG tablet, Take 1 tablet by mouth twice daily, Disp: 180 tablet, Rfl: 1    valACYclovir (VALTREX) 1000 MG tablet, Take 2 tablets b.i.d. x1 day as needed for a cold sore, Disp: 4 tablet, Rfl: 11    varenicline tartrate (CHANTIX) 1 mg Tab, Take 1 tablet by mouth twice daily, Disp: 180 tablet, Rfl: 0    zinc gluconate 50 mg tablet, Take 50 mg by mouth once daily., Disp: , Rfl:

## 2025-04-25 ENCOUNTER — RESULTS FOLLOW-UP (OUTPATIENT)
Dept: DERMATOLOGY | Facility: CLINIC | Age: 65
End: 2025-04-25

## 2025-04-25 LAB — MICROORGANISM SPEC CULT: NORMAL

## 2025-04-28 ENCOUNTER — TELEPHONE (OUTPATIENT)
Dept: DERMATOLOGY | Facility: CLINIC | Age: 65
End: 2025-04-28
Payer: MEDICARE

## 2025-04-28 DIAGNOSIS — H92.09 OTALGIA, UNSPECIFIED LATERALITY: Primary | ICD-10-CM

## 2025-04-28 NOTE — TELEPHONE ENCOUNTER
Call to patient. Notified that Dr. Perry would like to refer to ENT at this time. Verbalized understanding. Denies further questions or concerns at this time.     ----- Message from Aaliyah sent at 4/28/2025  9:17 AM CDT -----  Regarding: Talk to Nurse  Who Called: Janene Ybarra said her ear is still swollen and she cannot hear out of it now. She is requesting to speak to a nursePatient's Preferred Phone Number on File: [phone]

## 2025-04-29 ENCOUNTER — OFFICE VISIT (OUTPATIENT)
Dept: OTOLARYNGOLOGY | Facility: CLINIC | Age: 65
End: 2025-04-29
Payer: MEDICARE

## 2025-04-29 VITALS — HEIGHT: 67 IN | WEIGHT: 201 LBS | BODY MASS INDEX: 31.55 KG/M2

## 2025-04-29 DIAGNOSIS — H60.91 OTITIS EXTERNA OF RIGHT EAR, UNSPECIFIED CHRONICITY, UNSPECIFIED TYPE: ICD-10-CM

## 2025-04-29 DIAGNOSIS — H92.09 OTALGIA, UNSPECIFIED LATERALITY: ICD-10-CM

## 2025-04-29 DIAGNOSIS — H60.501 ACUTE OTITIS EXTERNA OF RIGHT EAR, UNSPECIFIED TYPE: Primary | ICD-10-CM

## 2025-04-29 PROCEDURE — 99214 OFFICE O/P EST MOD 30 MIN: CPT | Mod: PBBFAC | Performed by: OTOLARYNGOLOGY

## 2025-04-29 PROCEDURE — 99204 OFFICE O/P NEW MOD 45 MIN: CPT | Mod: S$PBB,,, | Performed by: OTOLARYNGOLOGY

## 2025-04-29 PROCEDURE — 1160F RVW MEDS BY RX/DR IN RCRD: CPT | Mod: CPTII,,, | Performed by: OTOLARYNGOLOGY

## 2025-04-29 PROCEDURE — 3008F BODY MASS INDEX DOCD: CPT | Mod: CPTII,,, | Performed by: OTOLARYNGOLOGY

## 2025-04-29 PROCEDURE — 4010F ACE/ARB THERAPY RXD/TAKEN: CPT | Mod: CPTII,,, | Performed by: OTOLARYNGOLOGY

## 2025-04-29 PROCEDURE — 99999 PR PBB SHADOW E&M-EST. PATIENT-LVL IV: CPT | Mod: PBBFAC,,, | Performed by: OTOLARYNGOLOGY

## 2025-04-29 PROCEDURE — 1159F MED LIST DOCD IN RCRD: CPT | Mod: CPTII,,, | Performed by: OTOLARYNGOLOGY

## 2025-04-29 RX ORDER — NEOMYCIN SULFATE, POLYMYXIN B SULFATE AND HYDROCORTISONE 10; 3.5; 1 MG/ML; MG/ML; [USP'U]/ML
3 SUSPENSION/ DROPS AURICULAR (OTIC) 2 TIMES DAILY
Qty: 10 ML | Refills: 0 | Status: SHIPPED | OUTPATIENT
Start: 2025-04-29

## 2025-04-29 RX ORDER — CIPROFLOXACIN 500 MG/1
500 TABLET ORAL 2 TIMES DAILY
Qty: 20 TABLET | Refills: 0 | Status: SHIPPED | OUTPATIENT
Start: 2025-04-29

## 2025-04-29 NOTE — PROGRESS NOTES
Subjective:       Patient ID: Janene Castillo is a 65 y.o. female.    Chief Complaint: Otalgia (Patient complains of right ear pain. States her ear canal is swollen.) and Other Misc (Patient complains of having to constantly clear her throat. Denies sinus drainage.)    Otalgia   Associated symptoms include ear discharge.     Review of Systems   HENT:  Positive for ear discharge and ear pain.    All other systems reviewed and are negative.      Objective:      Physical Exam  General: NAD  Head: Normocephalic, atraumatic, no facial asymmetry/normal strength,  Ears: Both auricules normal in appearance, w/o deformities tympanic membranes normal external auditory canals swollen red   Nose: External nose w/o deformities normal turbinates no drainage or inflammation  Oral Cavity: Lips, gums, floor of mouth, tongue hard palate, and buccal mucosa without mass/lesion  Oropharynx: Mucosa pink and moist, soft palate, posterior pharynx and oropharyngeal wall without mass/lesion  Neck: Supple, symmetric, trachea midline, no palpable mass/lesion, no palpable cervical lymphadenopathy  Skin: Warm and dry, no concerning lesions  Respiratory: Respirations even, unlabored    Assessment:       1. Acute otitis externa of right ear, unspecified type    2. Otalgia, unspecified laterality    3. Otitis externa of right ear, unspecified chronicity, unspecified type        Plan:     Discussed psoriasis causes   CSP CIPRO    F/u 2 weeks

## 2025-05-06 ENCOUNTER — TELEPHONE (OUTPATIENT)
Dept: OTOLARYNGOLOGY | Facility: CLINIC | Age: 65
End: 2025-05-06
Payer: MEDICARE

## 2025-05-06 ENCOUNTER — TELEPHONE (OUTPATIENT)
Dept: DERMATOLOGY | Facility: CLINIC | Age: 65
End: 2025-05-06
Payer: MEDICARE

## 2025-05-06 RX ORDER — FLUCONAZOLE 150 MG/1
150 TABLET ORAL DAILY
Qty: 1 TABLET | Refills: 0 | Status: SHIPPED | OUTPATIENT
Start: 2025-05-06 | End: 2025-05-07

## 2025-05-06 NOTE — TELEPHONE ENCOUNTER
----- Message from Breonna sent at 5/5/2025  8:36 AM CDT -----  Regarding: call from nurse request  Who Called: Janene CastilloFabcandido is requesting assistance/information from provider's office.Preferred Method of Contact: Phone CallPatient's Preferred Phone Number on File: [phone] Additional Information:  Mrs. Castillo called and stated that her ear is still full and she can not hear out of it. Her mouth is burning possible from the antibiotic. She is on Renvoc and this acts against the antibiotic. Please call her and let her discuss her options with you.        Returned pt's phone call re: left ear.   Dr. Carlson wants to see pt on Thursday, 5/9/25 to see what is going on now with her left ear--appt made for 5/9/25 @ 9:00am--pt agreed and voiced understanding.

## 2025-05-06 NOTE — TELEPHONE ENCOUNTER
Pt complaining of white tongue and burning mouth following cipro.  Will start diflucan for possible yeast.  Pt has follow up with ENT to address her ear pain     ----- Message from Xiomy sent at 5/6/2025  2:52 PM CDT -----  Regarding: MEDICATION  Who Called: Janene Cornejo Left Message for Patient:Does the patient know what this is regarding?:YESPreferred Method of Contact: Phone CallPatient's Preferred Phone Number on File: [phone] Best Call Back Number, if different:Additional Information: NEEDS TO SPEAK WITH THE NURSE ABOUT MEDICATION

## 2025-05-08 ENCOUNTER — OFFICE VISIT (OUTPATIENT)
Dept: OTOLARYNGOLOGY | Facility: CLINIC | Age: 65
End: 2025-05-08
Payer: MEDICARE

## 2025-05-08 VITALS — BODY MASS INDEX: 31.55 KG/M2 | WEIGHT: 201 LBS | HEIGHT: 67 IN

## 2025-05-08 DIAGNOSIS — H91.91 HEARING LOSS OF RIGHT EAR, UNSPECIFIED HEARING LOSS TYPE: Primary | ICD-10-CM

## 2025-05-08 PROCEDURE — 1160F RVW MEDS BY RX/DR IN RCRD: CPT | Mod: CPTII,,, | Performed by: OTOLARYNGOLOGY

## 2025-05-08 PROCEDURE — 99214 OFFICE O/P EST MOD 30 MIN: CPT | Mod: S$PBB,,, | Performed by: OTOLARYNGOLOGY

## 2025-05-08 PROCEDURE — 1159F MED LIST DOCD IN RCRD: CPT | Mod: CPTII,,, | Performed by: OTOLARYNGOLOGY

## 2025-05-08 PROCEDURE — 99999 PR PBB SHADOW E&M-EST. PATIENT-LVL IV: CPT | Mod: PBBFAC,,, | Performed by: OTOLARYNGOLOGY

## 2025-05-08 PROCEDURE — 99214 OFFICE O/P EST MOD 30 MIN: CPT | Mod: PBBFAC | Performed by: OTOLARYNGOLOGY

## 2025-05-08 PROCEDURE — 4010F ACE/ARB THERAPY RXD/TAKEN: CPT | Mod: CPTII,,, | Performed by: OTOLARYNGOLOGY

## 2025-05-08 PROCEDURE — 3008F BODY MASS INDEX DOCD: CPT | Mod: CPTII,,, | Performed by: OTOLARYNGOLOGY

## 2025-05-08 RX ORDER — METHYLPREDNISOLONE 4 MG/1
TABLET ORAL
Qty: 1 EACH | Refills: 0 | Status: SHIPPED | OUTPATIENT
Start: 2025-05-08

## 2025-05-08 NOTE — PROGRESS NOTES
Subjective:       Patient ID: Janene Castillo is a 65 y.o. female.    Chief Complaint: Hearing Loss (Patient complains of not being able to hear from left ear.) and Otalgia (She complains of pain in her left ear. States she is currently taking antibiotic as directed.)    Hearing Loss:    Associated symptoms: Ear pain.    Otalgia   Associated symptoms include hearing loss.     Review of Systems   HENT:  Positive for ear pain and hearing loss.    All other systems reviewed and are negative.      Objective:      Physical Exam  General: NAD  Head: Normocephalic, atraumatic, no facial asymmetry/normal strength,  Ears: Both auricules normal in appearance, w/o deformities tympanic membranes normal external auditory canals normal  Nose: External nose w/o deformities normal turbinates no drainage or inflammation  Oral Cavity: Lips, gums, floor of mouth, tongue hard palate, and buccal mucosa without mass/lesion  Oropharynx: Mucosa pink and moist, soft palate, posterior pharynx and oropharyngeal wall without mass/lesion  Neck: Supple, symmetric, trachea midline, no palpable mass/lesion, no palpable cervical lymphadenopathy  Skin: Warm and dry, no concerning lesions  Respiratory: Respirations even, unlabored    Assessment:       1. Hearing loss of right ear, unspecified hearing loss type        Plan:       TG   TYPE As bilaterally   Will get CT IAC look for other causes

## 2025-05-08 NOTE — ADDENDUM NOTE
Addended by: JEANINE HARLEY on: 5/8/2025 09:23 AM     Modules accepted: Orders    
Addended by: LEONCIO VIZCARRA on: 5/8/2025 09:24 AM     Modules accepted: Orders    
Walk in

## 2025-05-10 DIAGNOSIS — I10 HYPERTENSION, UNSPECIFIED TYPE: ICD-10-CM

## 2025-05-12 RX ORDER — BENAZEPRIL HYDROCHLORIDE 20 MG/1
20 TABLET ORAL
Qty: 90 TABLET | Refills: 0 | Status: SHIPPED | OUTPATIENT
Start: 2025-05-12

## 2025-05-20 ENCOUNTER — OFFICE VISIT (OUTPATIENT)
Dept: FAMILY MEDICINE | Facility: CLINIC | Age: 65
End: 2025-05-20
Payer: MEDICARE

## 2025-05-20 VITALS
WEIGHT: 193 LBS | SYSTOLIC BLOOD PRESSURE: 104 MMHG | TEMPERATURE: 98 F | DIASTOLIC BLOOD PRESSURE: 72 MMHG | HEIGHT: 67 IN | HEART RATE: 68 BPM | RESPIRATION RATE: 18 BRPM | BODY MASS INDEX: 30.29 KG/M2 | OXYGEN SATURATION: 97 %

## 2025-05-20 DIAGNOSIS — M62.838 SPASM OF MUSCLE: ICD-10-CM

## 2025-05-20 DIAGNOSIS — E78.5 HYPERLIPIDEMIA, UNSPECIFIED HYPERLIPIDEMIA TYPE: ICD-10-CM

## 2025-05-20 DIAGNOSIS — R09.81 CONGESTION OF NASAL SINUS: ICD-10-CM

## 2025-05-20 DIAGNOSIS — I10 HYPERTENSION, UNSPECIFIED TYPE: Primary | ICD-10-CM

## 2025-05-20 DIAGNOSIS — H91.91 HEARING LOSS OF RIGHT EAR, UNSPECIFIED HEARING LOSS TYPE: ICD-10-CM

## 2025-05-20 LAB
ALBUMIN SERPL BCP-MCNC: 4.6 G/DL (ref 3.4–4.8)
ALBUMIN/GLOB SERPL: 1.6 {RATIO}
ALP SERPL-CCNC: 85 U/L (ref 40–150)
ALT SERPL W P-5'-P-CCNC: 20 U/L
ANION GAP SERPL CALCULATED.3IONS-SCNC: 16 MMOL/L (ref 7–16)
AST SERPL W P-5'-P-CCNC: 25 U/L (ref 11–45)
BILIRUB SERPL-MCNC: 0.7 MG/DL
BUN SERPL-MCNC: 17 MG/DL (ref 10–20)
BUN/CREAT SERPL: 13 (ref 6–20)
CALCIUM SERPL-MCNC: 9.5 MG/DL (ref 8.4–10.2)
CHLORIDE SERPL-SCNC: 104 MMOL/L (ref 98–107)
CHOLEST SERPL-MCNC: 195 MG/DL
CHOLEST/HDLC SERPL: 2.9 {RATIO}
CO2 SERPL-SCNC: 22 MMOL/L (ref 23–31)
CREAT SERPL-MCNC: 1.3 MG/DL (ref 0.55–1.02)
CREAT SERPL-MCNC: 1.31 MG/DL (ref 0.55–1.02)
EGFR (NO RACE VARIABLE) (RUSH/TITUS): 46 ML/MIN/1.73M2
EGFR (NO RACE VARIABLE) (RUSH/TITUS): 46 ML/MIN/1.73M2
GLOBULIN SER-MCNC: 2.9 G/DL (ref 2–4)
GLUCOSE SERPL-MCNC: 88 MG/DL (ref 82–115)
HDLC SERPL-MCNC: 68 MG/DL (ref 35–60)
LDLC SERPL CALC-MCNC: 108 MG/DL
LDLC/HDLC SERPL: 1.6 {RATIO}
NONHDLC SERPL-MCNC: 127 MG/DL
POTASSIUM SERPL-SCNC: 5.4 MMOL/L (ref 3.5–5.1)
PROT SERPL-MCNC: 7.5 G/DL (ref 5.8–7.6)
SODIUM SERPL-SCNC: 137 MMOL/L (ref 136–145)
TRIGL SERPL-MCNC: 93 MG/DL (ref 37–140)
VLDLC SERPL-MCNC: 19 MG/DL

## 2025-05-20 PROCEDURE — 3074F SYST BP LT 130 MM HG: CPT | Mod: ,,, | Performed by: FAMILY MEDICINE

## 2025-05-20 PROCEDURE — 3078F DIAST BP <80 MM HG: CPT | Mod: ,,, | Performed by: FAMILY MEDICINE

## 2025-05-20 PROCEDURE — 3008F BODY MASS INDEX DOCD: CPT | Mod: ,,, | Performed by: FAMILY MEDICINE

## 2025-05-20 PROCEDURE — 1160F RVW MEDS BY RX/DR IN RCRD: CPT | Mod: ,,, | Performed by: FAMILY MEDICINE

## 2025-05-20 PROCEDURE — 82565 ASSAY OF CREATININE: CPT | Mod: XU,,, | Performed by: CLINICAL MEDICAL LABORATORY

## 2025-05-20 PROCEDURE — 99213 OFFICE O/P EST LOW 20 MIN: CPT | Mod: ,,, | Performed by: FAMILY MEDICINE

## 2025-05-20 PROCEDURE — 80053 COMPREHEN METABOLIC PANEL: CPT | Mod: ,,, | Performed by: CLINICAL MEDICAL LABORATORY

## 2025-05-20 PROCEDURE — 1159F MED LIST DOCD IN RCRD: CPT | Mod: ,,, | Performed by: FAMILY MEDICINE

## 2025-05-20 PROCEDURE — 4010F ACE/ARB THERAPY RXD/TAKEN: CPT | Mod: ,,, | Performed by: FAMILY MEDICINE

## 2025-05-20 PROCEDURE — 80061 LIPID PANEL: CPT | Mod: ,,, | Performed by: CLINICAL MEDICAL LABORATORY

## 2025-05-20 RX ORDER — TIZANIDINE 4 MG/1
4 TABLET ORAL 2 TIMES DAILY
Qty: 180 TABLET | Refills: 1 | Status: SHIPPED | OUTPATIENT
Start: 2025-05-20

## 2025-05-20 RX ORDER — ONDANSETRON 4 MG/1
8 TABLET, FILM COATED ORAL EVERY 8 HOURS PRN
COMMUNITY

## 2025-05-20 RX ORDER — BENAZEPRIL HYDROCHLORIDE 20 MG/1
20 TABLET ORAL DAILY
Qty: 90 TABLET | Refills: 1 | Status: SHIPPED | OUTPATIENT
Start: 2025-05-20

## 2025-05-20 RX ORDER — MOMETASONE FUROATE MONOHYDRATE 50 UG/1
2 SPRAY, METERED NASAL DAILY
Qty: 17 G | Refills: 5 | Status: SHIPPED | OUTPATIENT
Start: 2025-05-20

## 2025-05-20 RX ORDER — EZETIMIBE 10 MG/1
10 TABLET ORAL NIGHTLY
Qty: 90 TABLET | Refills: 1 | Status: SHIPPED | OUTPATIENT
Start: 2025-05-20

## 2025-05-20 RX ORDER — ONDANSETRON 4 MG/1
4 TABLET, FILM COATED ORAL EVERY 8 HOURS PRN
COMMUNITY
End: 2025-05-20 | Stop reason: SDUPTHER

## 2025-05-20 RX ORDER — ONDANSETRON 4 MG/1
4 TABLET, FILM COATED ORAL EVERY 8 HOURS PRN
Qty: 30 TABLET | Refills: 5 | Status: SHIPPED | OUTPATIENT
Start: 2025-05-20

## 2025-05-20 NOTE — PROGRESS NOTES
Janene Castillo is a 65 y.o. female seen today for follow-up on her hyperlipidemia and hypertension.  She reports she is unable to tolerate the Leqvio due to muscle pain and refused the injection yesterday.  She is taking the Zetia but her diet remains poor and I have ordered a lipid panel for today.  She also complains of persistent pressure and pain sensation involving her right ear and reports she can not tolerate the Flonase do with the headaches.  We discussed a trial of Nasonex which has a water-base.  The patient did have emesis x2 today which she relates to her ear discomfort.    Past Medical History:   Diagnosis Date    Allergic contact dermatitis     balsam of peru, bacitracin, fragrance, methyldibromo glutaronite    Anxiety     Chronic pain     HTN (hypertension)     Hyperlipidemia     Plaque psoriasis     Prurigo nodularis     Psoriatic arthritis     Stage 3b chronic kidney disease      Family History   Problem Relation Name Age of Onset    Hypertension Mother      Heart disease Father      Heart attack Father      Heart failure Father      Pacemaker/defibrilator Father      Melanoma Neg Hx       Medications Ordered Prior to Encounter[1]  Immunization History   Administered Date(s) Administered    COVID-19 MRNA, LN-S PF (MODERNA HALF 0.25 ML DOSE) 04/26/2022    COVID-19, MRNA, LN-S, PF (MODERNA FULL 0.5 ML DOSE) 06/22/2021, 08/06/2021    COVID-19, mRNA, LNP-S, bivalent booster, PF (Moderna Omicron)12 + YEARS 11/16/2022    Influenza - Quadrivalent - PF *Preferred* (6 months and older) 10/18/2022    Influenza - Trivalent - Fluarix, Flulaval, Fluzone, Afluria - PF 10/07/2024    Tdap 01/20/2024       Review of Systems   Constitutional:  Negative for fever, malaise/fatigue and weight loss.   HENT:  Positive for congestion and ear pain.    Respiratory:  Negative for shortness of breath.    Cardiovascular:  Negative for chest pain and palpitations.   Gastrointestinal:  Negative for nausea and vomiting.    Psychiatric/Behavioral:  Negative for depression.         Vitals:    05/20/25 0955   BP: 104/72   Pulse: 68   Resp: 18   Temp: 98.2 °F (36.8 °C)       Physical Exam  Vitals reviewed.   Constitutional:       Appearance: Normal appearance.   HENT:      Head: Normocephalic.      Right Ear: Ear canal normal. Tympanic membrane is injected and retracted.   Eyes:      Extraocular Movements: Extraocular movements intact.      Conjunctiva/sclera: Conjunctivae normal.      Pupils: Pupils are equal, round, and reactive to light.   Neck:      Thyroid: No thyroid mass or thyromegaly.   Cardiovascular:      Rate and Rhythm: Normal rate and regular rhythm.      Heart sounds: Normal heart sounds. No murmur heard.     No gallop.   Pulmonary:      Effort: Pulmonary effort is normal. No respiratory distress.      Breath sounds: Normal breath sounds. No wheezing or rales.   Skin:     General: Skin is warm and dry.      Coloration: Skin is not jaundiced or pale.   Neurological:      Mental Status: She is alert.   Psychiatric:         Mood and Affect: Mood normal.         Behavior: Behavior normal.         Thought Content: Thought content normal.         Judgment: Judgment normal.          Assessment and Plan  1. Hypertension, unspecified type  -     Comprehensive Metabolic Panel; Future; Expected date: 05/20/2025  -     benazepriL (LOTENSIN) 20 MG tablet; Take 1 tablet (20 mg total) by mouth once daily.  Dispense: 90 tablet; Refill: 1    2. Hyperlipidemia, unspecified hyperlipidemia type  -     Lipid Panel; Future; Expected date: 05/20/2025  -     ezetimibe (ZETIA) 10 mg tablet; Take 1 tablet (10 mg total) by mouth every evening.  Dispense: 90 tablet; Refill: 1    3. Spasm of muscle  -     tiZANidine (ZANAFLEX) 4 MG tablet; Take 1 tablet (4 mg total) by mouth 2 (two) times daily.  Dispense: 180 tablet; Refill: 1    4. Congestion of nasal sinus  -     mometasone (NASONEX) 50 mcg/actuation nasal spray; 2 sprays by Nasal route once  daily.  Dispense: 17 g; Refill: 5    Other orders  -     ondansetron (ZOFRAN) 4 MG tablet; Take 1 tablet (4 mg total) by mouth every 8 (eight) hours as needed for Nausea.  Dispense: 30 tablet; Refill: 5             Return to clinic in 3 weeks for follow-up on her lab work and nasal congestion.    Health Maintenance Topics with due status: Not Due       Topic Last Completion Date    Colorectal Cancer Screening 12/22/2020    Cervical Cancer Screening 08/03/2023    TETANUS VACCINE 01/20/2024    Annual UACr 10/01/2024    Lipid Panel 12/12/2024              [1]   Current Outpatient Medications on File Prior to Visit   Medication Sig Dispense Refill    amLODIPine (NORVASC) 2.5 MG tablet Take 1 tablet (2.5 mg total) by mouth once daily. 90 tablet 1    buPROPion (WELLBUTRIN) 100 MG tablet Take 1 tablet by mouth twice daily 180 tablet 1    calcium carbonate (OS-CAIN) 600 mg calcium (1,500 mg) Tab Take 600 mg by mouth once daily.      coenzyme Q10 100 mg capsule as directed Orally      mupirocin (BACTROBAN) 2 % ointment Apply to affected areas TID 60 g 2    neomycin-polymyxin-hydrocortisone (CORTISPORIN) 3.5-10,000-1 mg/mL-unit/mL-% otic suspension Place 3 drops into the right ear 2 (two) times a day. 10 mL 0    ondansetron (ZOFRAN) 4 MG tablet Take 8 mg by mouth every 8 (eight) hours as needed for Nausea.      oxyCODONE-acetaminophen (PERCOCET)  mg per tablet Take 1 tablet by mouth 3 (three) times daily as needed.      RINVOQ 30 mg Tb24 Take 1 tablet by mouth once daily.      valACYclovir (VALTREX) 1000 MG tablet Take 2 tablets b.i.d. x1 day as needed for a cold sore 4 tablet 11    varenicline tartrate (CHANTIX) 1 mg Tab Take 1 tablet by mouth twice daily 180 tablet 0    zinc gluconate 50 mg tablet Take 50 mg by mouth once daily.      [DISCONTINUED] benazepriL (LOTENSIN) 20 MG tablet Take 1 tablet by mouth once daily 90 tablet 0    [DISCONTINUED] ezetimibe (ZETIA) 10 mg tablet TAKE 1 TABLET BY MOUTH ONCE DAILY IN THE  EVENING 90 tablet 1    [DISCONTINUED] tiZANidine (ZANAFLEX) 4 MG tablet Take 1 tablet by mouth twice daily 180 tablet 1    calcipotriene-betamethasone (TACLONEX) ointment Apply topically to affected area twice daily, tapering with improvement. (Patient not taking: Reported on 5/20/2025) 100 g 1    famotidine (PEPCID) 20 MG tablet Take 1 tablet by mouth twice daily (Patient not taking: Reported on 5/20/2025) 180 tablet 0    ivermectin (STROMECTOL) 3 mg Tab Take 6 tablets PO today. Repeat in one week (Patient not taking: Reported on 5/20/2025) 12 tablet 0    methylPREDNISolone (MEDROL DOSEPACK) 4 mg tablet use as directed (Patient not taking: Reported on 5/20/2025) 1 each 0    [DISCONTINUED] ciprofloxacin HCl (CIPRO) 500 MG tablet Take 1 tablet (500 mg total) by mouth 2 (two) times daily. (Patient not taking: Reported on 5/20/2025) 20 tablet 0    [DISCONTINUED] fluticasone propionate (FLONASE) 50 mcg/actuation nasal spray 2 sprays (100 mcg total) by Each Nostril route once daily. (Patient not taking: Reported on 5/20/2025) 16 g 5    [DISCONTINUED] ondansetron (ZOFRAN) 4 MG tablet Take 4 mg by mouth every 8 (eight) hours as needed for Nausea. (Patient not taking: Reported on 5/20/2025)       No current facility-administered medications on file prior to visit.

## 2025-05-21 ENCOUNTER — RESULTS FOLLOW-UP (OUTPATIENT)
Dept: FAMILY MEDICINE | Facility: CLINIC | Age: 65
End: 2025-05-21

## 2025-05-22 DIAGNOSIS — L20.84 INTRINSIC ATOPIC DERMATITIS: Primary | ICD-10-CM

## 2025-05-22 RX ORDER — UPADACITINIB 15 MG/1
15 TABLET, EXTENDED RELEASE ORAL DAILY
Qty: 30 TABLET | Refills: 11 | Status: SHIPPED | OUTPATIENT
Start: 2025-05-22

## 2025-05-22 NOTE — TELEPHONE ENCOUNTER
Call to patient. Informed pt that Dr. Perry is ok with lowering Rinvoq to 15mg daily.       Copied from CRM #[id number]. Topic: Medications - Medication Question  >> May 22, 2025  9:33 AM Any wrote:    Who Called: Janene Castillo    Caller is requesting assistance/information from provider's office.    Pt is wanting to speak to nurse to see about changing her prescription of RINVOQ 30 mg Tb24. Pt is wanting to see about getting dosage changed to 15 MG.     Preferred Method of Contact: Phone Call  Patient's Preferred Phone Number on File: [phone]   Best Call Back Number, if different:  Additional Information:

## 2025-05-29 ENCOUNTER — TELEPHONE (OUTPATIENT)
Dept: FAMILY MEDICINE | Facility: CLINIC | Age: 65
End: 2025-05-29
Payer: MEDICARE

## 2025-05-29 ENCOUNTER — TELEPHONE (OUTPATIENT)
Dept: DERMATOLOGY | Facility: CLINIC | Age: 65
End: 2025-05-29
Payer: MEDICARE

## 2025-05-29 NOTE — TELEPHONE ENCOUNTER
----- Message from Asael Huizar MD sent at 5/21/2025  7:53 AM CDT -----  Office visit for elevated potassium.  Blood pressure medication may need to be rearranged.  Her LDL is not to goal.  Her renal function is slowly declining  ----- Message -----  From: Lab, Background User  Sent: 5/20/2025   8:30 PM CDT  To: Asael Huizar MD

## 2025-05-29 NOTE — TELEPHONE ENCOUNTER
Pt notified that Dr Huizar would review her labs when she rtc to clinic on 6/6, notified her the importance of keeping her follow up due to elevated K, LDL and declining renal function and that Dr Huizar may need to adjust some of her bp medications, she voiced understanding and agreed.

## 2025-05-29 NOTE — TELEPHONE ENCOUNTER
Returned call to pt, pt wanted to let us know she started the lower strength rinvoq but was cutting her 30mg tablet in half. Instructed pt to get new prescription that is 15mg instead of cutting in half due to rinvoq being extended release and unable to be cut or crushed. Pt voiced understanding.     Copied from CRM #[id number]. Topic: Medications - Medication Question  >> May 28, 2025  2:52 PM Xiomy wrote:  Who Called: Janene Castillo        Who Left Message for Patient:  Does the patient know what this is regarding?:YES      Preferred Method of Contact: Phone Call  Patient's Preferred Phone Number on File: [phone]   Best Call Back Number, if different:  Additional Information: NEEDS TO SPEAK WITH THE NURSE ABOUT HER RINVOQ DOSAGE

## 2025-06-02 ENCOUNTER — OFFICE VISIT (OUTPATIENT)
Dept: FAMILY MEDICINE | Facility: CLINIC | Age: 65
End: 2025-06-02
Payer: MEDICARE

## 2025-06-02 VITALS
WEIGHT: 198.81 LBS | BODY MASS INDEX: 31.2 KG/M2 | TEMPERATURE: 99 F | OXYGEN SATURATION: 95 % | SYSTOLIC BLOOD PRESSURE: 127 MMHG | HEIGHT: 67 IN | HEART RATE: 61 BPM | RESPIRATION RATE: 15 BRPM | DIASTOLIC BLOOD PRESSURE: 78 MMHG

## 2025-06-02 DIAGNOSIS — M54.50 ACUTE LOW BACK PAIN, UNSPECIFIED BACK PAIN LATERALITY, UNSPECIFIED WHETHER SCIATICA PRESENT: ICD-10-CM

## 2025-06-02 DIAGNOSIS — R35.0 URINARY FREQUENCY: Primary | ICD-10-CM

## 2025-06-02 LAB
BILIRUB UR QL STRIP: NEGATIVE
CLARITY UR: CLEAR
COLOR UR: ABNORMAL
GLUCOSE UR STRIP-MCNC: NORMAL MG/DL
KETONES UR STRIP-SCNC: NEGATIVE MG/DL
LEUKOCYTE ESTERASE UR QL STRIP: ABNORMAL
MUCOUS, UA: ABNORMAL /LPF
NITRITE UR QL STRIP: NEGATIVE
PH UR STRIP: 5.5 PH UNITS
PROT UR QL STRIP: NEGATIVE
RBC # UR STRIP: NEGATIVE /UL
RBC #/AREA URNS HPF: 2 /HPF
SP GR UR STRIP: 1.02
SQUAMOUS #/AREA URNS LPF: ABNORMAL /HPF
UROBILINOGEN UR STRIP-ACNC: NORMAL MG/DL
WBC #/AREA URNS HPF: 2 /HPF

## 2025-06-02 PROCEDURE — 3074F SYST BP LT 130 MM HG: CPT | Mod: ,,, | Performed by: NURSE PRACTITIONER

## 2025-06-02 PROCEDURE — 3008F BODY MASS INDEX DOCD: CPT | Mod: ,,, | Performed by: NURSE PRACTITIONER

## 2025-06-02 PROCEDURE — 3078F DIAST BP <80 MM HG: CPT | Mod: ,,, | Performed by: NURSE PRACTITIONER

## 2025-06-02 PROCEDURE — 4010F ACE/ARB THERAPY RXD/TAKEN: CPT | Mod: ,,, | Performed by: NURSE PRACTITIONER

## 2025-06-02 PROCEDURE — 1159F MED LIST DOCD IN RCRD: CPT | Mod: ,,, | Performed by: NURSE PRACTITIONER

## 2025-06-02 PROCEDURE — 1160F RVW MEDS BY RX/DR IN RCRD: CPT | Mod: ,,, | Performed by: NURSE PRACTITIONER

## 2025-06-02 PROCEDURE — 81001 URINALYSIS AUTO W/SCOPE: CPT | Mod: ,,, | Performed by: CLINICAL MEDICAL LABORATORY

## 2025-06-02 PROCEDURE — 99213 OFFICE O/P EST LOW 20 MIN: CPT | Mod: ,,, | Performed by: NURSE PRACTITIONER

## 2025-06-02 RX ORDER — NITROFURANTOIN 25; 75 MG/1; MG/1
100 CAPSULE ORAL 2 TIMES DAILY
Qty: 14 CAPSULE | Refills: 0 | Status: SHIPPED | OUTPATIENT
Start: 2025-06-02 | End: 2025-06-06 | Stop reason: ALTCHOICE

## 2025-06-03 ENCOUNTER — OFFICE VISIT (OUTPATIENT)
Dept: FAMILY MEDICINE | Facility: CLINIC | Age: 65
End: 2025-06-03
Payer: MEDICARE

## 2025-06-03 VITALS
DIASTOLIC BLOOD PRESSURE: 77 MMHG | HEART RATE: 64 BPM | WEIGHT: 198 LBS | OXYGEN SATURATION: 97 % | RESPIRATION RATE: 15 BRPM | SYSTOLIC BLOOD PRESSURE: 124 MMHG | BODY MASS INDEX: 31.08 KG/M2 | HEIGHT: 67 IN | TEMPERATURE: 99 F

## 2025-06-03 DIAGNOSIS — T50.905A ADVERSE EFFECT OF DRUG, INITIAL ENCOUNTER: Primary | ICD-10-CM

## 2025-06-03 DIAGNOSIS — N89.8 VAGINAL ODOR: ICD-10-CM

## 2025-06-03 LAB
BACTERIAL VAGINOSIS DNA (OHS): POSITIVE
CANDIDA GLABRATA/KRUSEI DNA (OHS): NOT DETECTED
CANDIDA SPECIES DNA (OHS): NOT DETECTED
TRICHOMONAS VAGINALIS DNA (OHS): NOT DETECTED

## 2025-06-03 PROCEDURE — 99212 OFFICE O/P EST SF 10 MIN: CPT | Mod: ,,, | Performed by: NURSE PRACTITIONER

## 2025-06-03 PROCEDURE — 3078F DIAST BP <80 MM HG: CPT | Mod: ,,, | Performed by: NURSE PRACTITIONER

## 2025-06-03 PROCEDURE — 1160F RVW MEDS BY RX/DR IN RCRD: CPT | Mod: ,,, | Performed by: NURSE PRACTITIONER

## 2025-06-03 PROCEDURE — 3008F BODY MASS INDEX DOCD: CPT | Mod: ,,, | Performed by: NURSE PRACTITIONER

## 2025-06-03 PROCEDURE — 81515 NFCT DS BV&VAGINITIS DNA ALG: CPT | Mod: QW,,, | Performed by: CLINICAL MEDICAL LABORATORY

## 2025-06-03 PROCEDURE — 4010F ACE/ARB THERAPY RXD/TAKEN: CPT | Mod: ,,, | Performed by: NURSE PRACTITIONER

## 2025-06-03 PROCEDURE — 3074F SYST BP LT 130 MM HG: CPT | Mod: ,,, | Performed by: NURSE PRACTITIONER

## 2025-06-03 PROCEDURE — 1159F MED LIST DOCD IN RCRD: CPT | Mod: ,,, | Performed by: NURSE PRACTITIONER

## 2025-06-04 ENCOUNTER — RESULTS FOLLOW-UP (OUTPATIENT)
Dept: FAMILY MEDICINE | Facility: CLINIC | Age: 65
End: 2025-06-04

## 2025-06-04 ENCOUNTER — TELEPHONE (OUTPATIENT)
Dept: OBSTETRICS AND GYNECOLOGY | Facility: CLINIC | Age: 65
End: 2025-06-04
Payer: MEDICARE

## 2025-06-04 ENCOUNTER — RESULTS FOLLOW-UP (OUTPATIENT)
Dept: FAMILY MEDICINE | Facility: CLINIC | Age: 65
End: 2025-06-04
Payer: MEDICARE

## 2025-06-04 DIAGNOSIS — B96.89 BACTERIAL VAGINOSIS: Primary | ICD-10-CM

## 2025-06-04 DIAGNOSIS — N76.0 BACTERIAL VAGINOSIS: Primary | ICD-10-CM

## 2025-06-04 RX ORDER — METRONIDAZOLE 500 MG/1
500 TABLET ORAL 2 TIMES DAILY
Qty: 14 TABLET | Refills: 0 | Status: SHIPPED | OUTPATIENT
Start: 2025-06-04 | End: 2025-06-11

## 2025-06-05 ENCOUNTER — TELEPHONE (OUTPATIENT)
Dept: FAMILY MEDICINE | Facility: CLINIC | Age: 65
End: 2025-06-05
Payer: MEDICARE

## 2025-06-06 ENCOUNTER — OFFICE VISIT (OUTPATIENT)
Dept: FAMILY MEDICINE | Facility: CLINIC | Age: 65
End: 2025-06-06
Payer: MEDICARE

## 2025-06-06 VITALS
HEART RATE: 68 BPM | DIASTOLIC BLOOD PRESSURE: 78 MMHG | HEIGHT: 67 IN | WEIGHT: 195 LBS | RESPIRATION RATE: 18 BRPM | OXYGEN SATURATION: 99 % | BODY MASS INDEX: 30.61 KG/M2 | SYSTOLIC BLOOD PRESSURE: 118 MMHG

## 2025-06-06 DIAGNOSIS — E78.5 HYPERLIPIDEMIA, UNSPECIFIED HYPERLIPIDEMIA TYPE: Primary | ICD-10-CM

## 2025-06-06 DIAGNOSIS — Z78.9 STATIN INTOLERANCE: ICD-10-CM

## 2025-06-09 ENCOUNTER — TELEPHONE (OUTPATIENT)
Dept: FAMILY MEDICINE | Facility: CLINIC | Age: 65
End: 2025-06-09
Payer: MEDICARE

## 2025-06-09 NOTE — PROGRESS NOTES
[facility]    Chief Complaint      Chief Complaint   Patient presents with    Rash     Patient reports a rash on her abdomen, small red spots with itching. Patient states she has taken 3 of the Nitrofurantoin Mono        History of Present Illness      Janene Castillo is a 65 y.o. female. She  has a past medical history of Allergic contact dermatitis, Anxiety, Chronic pain, HTN (hypertension), Hyperlipidemia, Plaque psoriasis, Prurigo nodularis, Psoriatic arthritis, and Stage 3b chronic kidney disease., who presents today for c/o a rash on her abdomen and itching.  Reports it started after taking the Macrobid- has taken 3 doses.     Past Medical History:  Past Medical History:   Diagnosis Date    Allergic contact dermatitis     balsam of peru, bacitracin, fragrance, methyldibromo glutaronite    Anxiety     Chronic pain     HTN (hypertension)     Hyperlipidemia     Plaque psoriasis     Prurigo nodularis     Psoriatic arthritis     Stage 3b chronic kidney disease        Past Surgical History:   has a past surgical history that includes  section and ANGIOGRAM, CORONARY, WITH LEFT HEART CATHETERIZATION (N/A, 2022).    Social History:  Social History[1]    I personally reviewed all past medical, surgical, and social.     Review of Systems   Respiratory:  Negative for shortness of breath and wheezing.    Gastrointestinal:  Negative for nausea and vomiting.   Skin:  Positive for rash.        Medications:  Encounter Medications[2]    Allergies:  Review of patient's allergies indicates:   Allergen Reactions    Bacitracin Dermatitis    Codeine sulfate     Codeine Rash     Rash     Methyldibromoglutaronitrile Dermatitis    Permethrin Rash     Rash     Sulfa (sulfonamide antibiotics) Other (See Comments) and Rash     unknown       Health Maintenance:  Immunization History   Administered Date(s) Administered    COVID-19 MRNA, LN-S PF (MODERNA HALF 0.25 ML DOSE) 2022    COVID-19, MRNA, LN-S, PF (MODERNA  "FULL 0.5 ML DOSE) 06/22/2021, 08/06/2021    COVID-19, mRNA, LNP-S, bivalent booster, PF (Moderna Omicron)12 + YEARS 11/16/2022    Influenza - Quadrivalent - PF *Preferred* (6 months and older) 10/18/2022    Influenza - Trivalent - Fluarix, Flulaval, Fluzone, Afluria - PF 10/07/2024    Tdap 01/20/2024      Health Maintenance   Topic Date Due    HIV Screening  Never done    Hemoglobin A1c (Diabetic Prevention Screening)  Never done    LDCT Lung Screen  Never done    Shingles Vaccine (1 of 2) Never done    Pneumococcal Vaccines (Age 50+) (1 of 1 - PCV) Never done    RSV Vaccine (Age 60+ and Pregnant patients) (1 - Risk 60-74 years 1-dose series) Never done    Mammogram  08/28/2024    COVID-19 Vaccine (5 - 2024-25 season) 09/01/2024    Annual UACr  10/01/2025    Cervical Cancer Screening  08/03/2028    Lipid Panel  05/20/2030    Colorectal Cancer Screening  12/22/2030    TETANUS VACCINE  01/20/2034    Hepatitis C Screening  Completed    Influenza Vaccine  Completed        Physical Exam      Vital Signs  Temp: 98.6 °F (37 °C)  Temp Source: Oral  Pulse: 64  Resp: 15  SpO2: 97 %  BP: 124/77  BP Location: Left arm  Patient Position: Sitting  Pain Score:   2  Pain Loc: Back  Height and Weight  Height: 5' 7" (170.2 cm)  Weight: 89.8 kg (198 lb)  BSA (Calculated - sq m): 2.06 sq meters  BMI (Calculated): 31  Weight in (lb) to have BMI = 25: 159.3]    Physical Exam  Vitals and nursing note reviewed.   Constitutional:       Appearance: Normal appearance. She is well-developed.   HENT:      Head: Normocephalic.      Right Ear: Hearing normal.      Left Ear: Hearing normal.      Nose: Nose normal.   Eyes:      General: Lids are normal.      Conjunctiva/sclera: Conjunctivae normal.   Cardiovascular:      Rate and Rhythm: Normal rate and regular rhythm.      Heart sounds: Normal heart sounds.   Pulmonary:      Effort: Pulmonary effort is normal.      Breath sounds: Normal breath sounds.   Musculoskeletal:         General: Normal " range of motion.      Cervical back: Normal range of motion and neck supple.   Skin:     General: Skin is warm and dry.      Findings: Rash present. Rash is urticarial.          Neurological:      Mental Status: She is alert and oriented to person, place, and time.      Gait: Gait is intact.   Psychiatric:         Behavior: Behavior is cooperative.          Laboratory:  CBC:  Recent Labs   Lab 01/05/24  1019 07/02/24  0857 12/12/24  0715   WBC 3.47 L 3.48 L 5.03   RBC 3.71 L 3.68 L 3.92 L   Hemoglobin 10.9 L 10.8 L 11.3 L   Hematocrit 34.2 L 36.0 L 36.6 L   Platelet Count 281 263 322   MCV 92.2 97.8 H 93.4   MCH 29.4 29.3 28.8   MCHC 31.9 L 30.0 L 30.9 L     CMP:  Recent Labs   Lab 07/02/24  0857 12/12/24  0715 05/20/25  1014   Glucose 82 94 88   Calcium 9.0 9.2 9.5   Albumin 3.9 4.0 4.6   Total Protein 6.6 6.9 7.5   Sodium 142 138 137   Potassium 5.2 H 5.4 H 5.4 H   CO2 28 25 22 L   Chloride 110 H 107 104   BUN 19 H 20 17   Alk Phos 122 138 85   ALT 24 17 20   AST 23 35 H 25   Bilirubin, Total 0.4 0.3 0.7     LIPIDS:  Recent Labs   Lab 07/02/24  0857 12/12/24  0715 05/20/25  1014   HDL Cholesterol 56 56 68 H   Cholesterol 185 165 195   Triglycerides 107 105 93   LDL Calculated 108 88 108   Cholesterol/HDL Ratio (Risk Factor) 3.3 2.9 2.9   Non- 109 127     TSH:      A1C:        Assessment/Plan     Janene Castillo is a 65 y.o.female with:     1. Adverse effect of drug, initial encounter    2. Vaginal odor  -     Vaginosis Screen by DNA Probe       Recommend taking OTC anti-histamine and pepcid  Stop Macrobid- UA did not reflex to culture- will obtain vaginal swab today    Total time spent face-to-face and non-face-to-face coordinating care for this encounter was: 15 minutes     Chronic conditions status updated as per HPI.  Other than changes above, cont current medications and maintain follow up with specialists.  Return to clinic prn if symptoms worsen or fail to improve.    Mariah Ellis, [facility]  [facility]         [1]   Social History  Tobacco Use    Smoking status: Former     Current packs/day: 0.00     Average packs/day: 1 pack/day for 44.0 years (44.0 ttl pk-yrs)     Types: Cigarettes     Start date:      Quit date:      Years since quittin.4     Passive exposure: Past    Smokeless tobacco: Never   Substance Use Topics    Alcohol use: Not Currently    Drug use: Never   [2]   Outpatient Encounter Medications as of 6/3/2025   Medication Sig Dispense Refill    amLODIPine (NORVASC) 2.5 MG tablet Take 1 tablet (2.5 mg total) by mouth once daily. 90 tablet 1    benazepriL (LOTENSIN) 20 MG tablet Take 1 tablet (20 mg total) by mouth once daily. 90 tablet 1    buPROPion (WELLBUTRIN) 100 MG tablet Take 1 tablet by mouth twice daily 180 tablet 1    calcium carbonate (OS-CAIN) 600 mg calcium (1,500 mg) Tab Take 600 mg by mouth once daily.      coenzyme Q10 100 mg capsule as directed Orally      ezetimibe (ZETIA) 10 mg tablet Take 1 tablet (10 mg total) by mouth every evening. 90 tablet 1    mometasone (NASONEX) 50 mcg/actuation nasal spray 2 sprays by Nasal route once daily. 17 g 5    mupirocin (BACTROBAN) 2 % ointment Apply to affected areas TID 60 g 2    ondansetron (ZOFRAN) 4 MG tablet Take 1 tablet (4 mg total) by mouth every 8 (eight) hours as needed for Nausea. 30 tablet 5    oxyCODONE-acetaminophen (PERCOCET)  mg per tablet Take 1 tablet by mouth 3 (three) times daily as needed.      RINVOQ 15 mg 24 hr tablet Take 1 tablet (15 mg total) by mouth once daily. 30 tablet 11    tiZANidine (ZANAFLEX) 4 MG tablet Take 1 tablet (4 mg total) by mouth 2 (two) times daily. 180 tablet 1    valACYclovir (VALTREX) 1000 MG tablet Take 2 tablets b.i.d. x1 day as needed for a cold sore 4 tablet 11    varenicline tartrate (CHANTIX) 1 mg Tab Take 1 tablet by mouth twice daily 180 tablet 0    zinc gluconate 50 mg tablet Take 50 mg by mouth once daily.      [DISCONTINUED] neomycin-polymyxin-hydrocortisone (CORTISPORIN)  3.5-10,000-1 mg/mL-unit/mL-% otic suspension Place 3 drops into the right ear 2 (two) times a day. (Patient not taking: Reported on 6/6/2025) 10 mL 0    [DISCONTINUED] nitrofurantoin, macrocrystal-monohydrate, (MACROBID) 100 MG capsule Take 1 capsule (100 mg total) by mouth 2 (two) times daily. for 7 days (Patient not taking: Reported on 6/6/2025) 14 capsule 0    [DISCONTINUED] ondansetron (ZOFRAN) 4 MG tablet Take 8 mg by mouth every 8 (eight) hours as needed for Nausea. (Patient not taking: Reported on 6/6/2025)       No facility-administered encounter medications on file as of 6/3/2025.

## 2025-06-09 NOTE — TELEPHONE ENCOUNTER
Patient contacted the clinic to state that Dr Alcala stated her sodium was elevated and wanted to  know what was needing to be done. After consulting with Dr Huizar patient was notified that her sodium was normal, however her potassium was slightly elevated and that he wanted her to increase her water intake. Patient voiced understanding

## 2025-06-11 ENCOUNTER — HOSPITAL ENCOUNTER (OUTPATIENT)
Dept: RADIOLOGY | Facility: HOSPITAL | Age: 65
Discharge: HOME | End: 2025-06-11
Attending: NURSE PRACTITIONER
Payer: MEDICARE

## 2025-06-11 DIAGNOSIS — M25.562 LEFT KNEE PAIN: ICD-10-CM

## 2025-06-11 PROCEDURE — 73560 X-RAY EXAM OF KNEE 1 OR 2: CPT | Mod: TC,LT

## 2025-06-15 DIAGNOSIS — B00.1 HERPES LABIALIS: ICD-10-CM

## 2025-06-16 RX ORDER — VALACYCLOVIR HYDROCHLORIDE 1 G/1
2000 TABLET, FILM COATED ORAL 2 TIMES DAILY PRN
Qty: 4 TABLET | Refills: 0 | OUTPATIENT
Start: 2025-06-16

## 2025-06-20 DIAGNOSIS — B00.1 HERPES LABIALIS: ICD-10-CM

## 2025-06-20 RX ORDER — VALACYCLOVIR HYDROCHLORIDE 1 G/1
2000 TABLET, FILM COATED ORAL 2 TIMES DAILY PRN
Qty: 4 TABLET | Refills: 0 | Status: SHIPPED | OUTPATIENT
Start: 2025-06-20

## 2025-06-24 ENCOUNTER — TELEPHONE (OUTPATIENT)
Dept: DERMATOLOGY | Facility: CLINIC | Age: 65
End: 2025-06-24
Payer: MEDICARE

## 2025-06-24 ENCOUNTER — OFFICE VISIT (OUTPATIENT)
Dept: FAMILY MEDICINE | Facility: CLINIC | Age: 65
End: 2025-06-24
Payer: MEDICARE

## 2025-06-24 VITALS
TEMPERATURE: 99 F | DIASTOLIC BLOOD PRESSURE: 82 MMHG | HEIGHT: 67 IN | BODY MASS INDEX: 29.51 KG/M2 | HEART RATE: 88 BPM | OXYGEN SATURATION: 98 % | RESPIRATION RATE: 16 BRPM | SYSTOLIC BLOOD PRESSURE: 115 MMHG | WEIGHT: 188 LBS

## 2025-06-24 DIAGNOSIS — R10.9 ABDOMINAL PAIN, UNSPECIFIED ABDOMINAL LOCATION: Primary | ICD-10-CM

## 2025-06-24 LAB
ALBUMIN SERPL BCP-MCNC: 4.3 G/DL (ref 3.4–4.8)
ALBUMIN/GLOB SERPL: 1.3 {RATIO}
ALP SERPL-CCNC: 79 U/L (ref 40–150)
ALT SERPL W P-5'-P-CCNC: 16 U/L
ANION GAP SERPL CALCULATED.3IONS-SCNC: 12 MMOL/L (ref 7–16)
AST SERPL W P-5'-P-CCNC: 23 U/L (ref 11–45)
BASOPHILS # BLD AUTO: 0.03 K/UL (ref 0–0.2)
BASOPHILS NFR BLD AUTO: 0.3 % (ref 0–1)
BILIRUB SERPL-MCNC: 0.5 MG/DL
BUN SERPL-MCNC: 20 MG/DL (ref 10–20)
BUN/CREAT SERPL: 16 (ref 6–20)
CALCIUM SERPL-MCNC: 9.3 MG/DL (ref 8.4–10.2)
CHLORIDE SERPL-SCNC: 105 MMOL/L (ref 98–107)
CO2 SERPL-SCNC: 24 MMOL/L (ref 23–31)
CREAT SERPL-MCNC: 1.29 MG/DL (ref 0.55–1.02)
DIFFERENTIAL METHOD BLD: ABNORMAL
EGFR (NO RACE VARIABLE) (RUSH/TITUS): 47 ML/MIN/1.73M2
EOSINOPHIL # BLD AUTO: 0.02 K/UL (ref 0–0.5)
EOSINOPHIL NFR BLD AUTO: 0.2 % (ref 1–4)
ERYTHROCYTE [DISTWIDTH] IN BLOOD BY AUTOMATED COUNT: 13.5 % (ref 11.5–14.5)
GLOBULIN SER-MCNC: 3.3 G/DL (ref 2–4)
GLUCOSE SERPL-MCNC: 110 MG/DL (ref 82–115)
HCT VFR BLD AUTO: 38.8 % (ref 38–47)
HGB BLD-MCNC: 12.4 G/DL (ref 12–16)
IMM GRANULOCYTES # BLD AUTO: 0.04 K/UL (ref 0–0.04)
IMM GRANULOCYTES NFR BLD: 0.4 % (ref 0–0.4)
LYMPHOCYTES # BLD AUTO: 0.48 K/UL (ref 1–4.8)
LYMPHOCYTES NFR BLD AUTO: 4.9 % (ref 27–41)
MCH RBC QN AUTO: 29 PG (ref 27–31)
MCHC RBC AUTO-ENTMCNC: 32 G/DL (ref 32–36)
MCV RBC AUTO: 90.9 FL (ref 80–96)
MONOCYTES # BLD AUTO: 1.13 K/UL (ref 0–0.8)
MONOCYTES NFR BLD AUTO: 11.6 % (ref 2–6)
MPC BLD CALC-MCNC: 10.3 FL (ref 9.4–12.4)
NEUTROPHILS # BLD AUTO: 8.04 K/UL (ref 1.8–7.7)
NEUTROPHILS NFR BLD AUTO: 82.6 % (ref 53–65)
NRBC # BLD AUTO: 0 X10E3/UL
NRBC, AUTO (.00): 0 %
PLATELET # BLD AUTO: 327 K/UL (ref 150–400)
POTASSIUM SERPL-SCNC: 4.4 MMOL/L (ref 3.5–5.1)
PROT SERPL-MCNC: 7.6 G/DL (ref 5.8–7.6)
RBC # BLD AUTO: 4.27 M/UL (ref 4.2–5.4)
SODIUM SERPL-SCNC: 137 MMOL/L (ref 136–145)
WBC # BLD AUTO: 9.74 K/UL (ref 4.5–11)

## 2025-06-24 PROCEDURE — 1159F MED LIST DOCD IN RCRD: CPT | Mod: ,,, | Performed by: NURSE PRACTITIONER

## 2025-06-24 PROCEDURE — 3008F BODY MASS INDEX DOCD: CPT | Mod: ,,, | Performed by: NURSE PRACTITIONER

## 2025-06-24 PROCEDURE — 99213 OFFICE O/P EST LOW 20 MIN: CPT | Mod: ,,, | Performed by: NURSE PRACTITIONER

## 2025-06-24 PROCEDURE — 80053 COMPREHEN METABOLIC PANEL: CPT | Mod: ,,, | Performed by: CLINICAL MEDICAL LABORATORY

## 2025-06-24 PROCEDURE — 1160F RVW MEDS BY RX/DR IN RCRD: CPT | Mod: ,,, | Performed by: NURSE PRACTITIONER

## 2025-06-24 PROCEDURE — 3074F SYST BP LT 130 MM HG: CPT | Mod: ,,, | Performed by: NURSE PRACTITIONER

## 2025-06-24 PROCEDURE — 3079F DIAST BP 80-89 MM HG: CPT | Mod: ,,, | Performed by: NURSE PRACTITIONER

## 2025-06-24 PROCEDURE — 4010F ACE/ARB THERAPY RXD/TAKEN: CPT | Mod: ,,, | Performed by: NURSE PRACTITIONER

## 2025-06-24 PROCEDURE — 85025 COMPLETE CBC W/AUTO DIFF WBC: CPT | Mod: ,,, | Performed by: CLINICAL MEDICAL LABORATORY

## 2025-06-24 NOTE — TELEPHONE ENCOUNTER
Copied from CRM #[id number]. Topic: General Inquiry - Patient Advice  >> Jun 24, 2025  9:47 AM Terrence wrote:  Who Called: Janene Castillo    Pt is calling asking to speak to a nurse she has some questions     Preferred Method of Contact: Phone Call  Patient's Preferred Phone Number on File: [phone]   Best Call Back Number, if different:  Additional Information:

## 2025-06-24 NOTE — PROGRESS NOTES
[facility]    Chief Complaint      Chief Complaint   Patient presents with    Abdominal Pain     Ate a sandwich yesterday and started sweating and head pounding, and then threw up and then diarrhea; had to call 911 but before they could get there she was able to move more and then cancelled the ambulance    Documentation Only     Hopes not related to tick bite, Saturday or ; states that when she had similar symptoms before she had ischemic colitis (where she can't move and sweating and everything) (was about a year ago); started passing blood today; pink and small amount.       History of Present Illness      Janene Castillo is a 65 y.o. female. She  has a past medical history of Allergic contact dermatitis, Anemia, Anxiety, Chronic pain, Depression, Fever blister, HTN (hypertension), Hyperlipidemia, Joint pain, Plaque psoriasis, Prurigo nodularis, Psoriatic arthritis, Skin disease, and Stage 3b chronic kidney disease., who presents today for c/o abdominal pain, diarrhea, and sweating after eating a sandwich.  Reports this morning she noticed a small amount of pink tinge to her stool.     Past Medical History:  Past Medical History:   Diagnosis Date    Allergic contact dermatitis     balsam of peru, bacitracin, fragrance, methyldibromo glutaronite    Anemia     Anxiety     Chronic pain     Depression     Fever blister     HTN (hypertension)     Hyperlipidemia     Joint pain     Plaque psoriasis     Prurigo nodularis     Psoriatic arthritis     Skin disease     Stage 3b chronic kidney disease        Past Surgical History:   has a past surgical history that includes  section; ANGIOGRAM, CORONARY, WITH LEFT HEART CATHETERIZATION (N/A, 2022); and Skin biopsy.    Social History:  Social History[1]    I personally reviewed all past medical, surgical, and social.     Review of Systems   Constitutional:  Positive for diaphoresis. Negative for chills and fever.   Respiratory:  Negative for  shortness of breath.    Cardiovascular: Negative.    Gastrointestinal:  Positive for abdominal pain, blood in stool, diarrhea, nausea and vomiting.   Musculoskeletal:  Negative for gait problem.   Skin: Negative.    Neurological:  Negative for headaches.        Medications:  Encounter Medications[2]    Allergies:  Review of patient's allergies indicates:   Allergen Reactions    Bacitracin Dermatitis    Codeine sulfate     Codeine Rash     Rash     Methyldibromoglutaronitrile Dermatitis    Permethrin Rash     Rash     Sulfa (sulfonamide antibiotics) Other (See Comments) and Rash     unknown       Health Maintenance:  Immunization History   Administered Date(s) Administered    COVID-19 MRNA, LN-S PF (MODERNA HALF 0.25 ML DOSE) 04/26/2022    COVID-19, MRNA, LN-S, PF (MODERNA FULL 0.5 ML DOSE) 06/22/2021, 08/06/2021    COVID-19, mRNA, LNP-S, bivalent booster, PF (Moderna Omicron)12 + YEARS 11/16/2022    Influenza - Quadrivalent - PF *Preferred* (6 months and older) 10/18/2022    Influenza - Trivalent - Fluarix, Flulaval, Fluzone, Afluria - PF 10/07/2024    Tdap 01/20/2024      Health Maintenance   Topic Date Due    HIV Screening  Never done    Hemoglobin A1c (Diabetic Prevention Screening)  Never done    LDCT Lung Screen  Never done    Shingles Vaccine (1 of 2) Never done    Pneumococcal Vaccines (Age 50+) (1 of 1 - PCV) Never done    RSV Vaccine (Age 60+ and Pregnant patients) (1 - Risk 60-74 years 1-dose series) Never done    Mammogram  08/28/2024    COVID-19 Vaccine (5 - 2024-25 season) 09/01/2024    Annual UACr  10/01/2025    Cervical Cancer Screening  08/03/2028    Lipid Panel  05/20/2030    Colorectal Cancer Screening  12/22/2030    TETANUS VACCINE  01/20/2034    Hepatitis C Screening  Completed    Influenza Vaccine  Completed        Physical Exam      Vital Signs  Temp: 98.9 °F (37.2 °C)  Temp Source: Oral  Pulse: 88  Resp: 16  SpO2: 98 %  BP: 115/82  Pain Score:   5  Pain Loc: Abdomen  Height and Weight  Height:  "5' 7" (170.2 cm)  Weight: 85.3 kg (188 lb)  BSA (Calculated - sq m): 2.01 sq meters  BMI (Calculated): 29.4  Weight in (lb) to have BMI = 25: 159.3]    Physical Exam  Vitals and nursing note reviewed.   Constitutional:       Appearance: Normal appearance. She is well-developed.   HENT:      Head: Normocephalic.      Right Ear: Hearing normal.      Left Ear: Hearing normal.      Nose: Nose normal.   Eyes:      General: Lids are normal.      Conjunctiva/sclera: Conjunctivae normal.   Cardiovascular:      Rate and Rhythm: Normal rate and regular rhythm.      Heart sounds: Normal heart sounds.   Pulmonary:      Effort: Pulmonary effort is normal.      Breath sounds: Normal breath sounds.   Abdominal:      General: Bowel sounds are normal.      Palpations: Abdomen is soft.      Tenderness: There is no abdominal tenderness.   Musculoskeletal:         General: Normal range of motion.      Cervical back: Normal range of motion and neck supple.      Right lower leg: No edema.      Left lower leg: No edema.   Skin:     General: Skin is warm and dry.   Neurological:      Mental Status: She is alert and oriented to person, place, and time.      Gait: Gait is intact.   Psychiatric:         Behavior: Behavior is cooperative.          Laboratory:  CBC:  Recent Labs   Lab 07/02/24  0857 12/12/24  0715 06/24/25  1536   WBC 3.48 L 5.03 9.74   RBC 3.68 L 3.92 L 4.27   Hemoglobin 10.8 L 11.3 L 12.4   Hematocrit 36.0 L 36.6 L 38.8   Platelet Count 263 322 327   MCV 97.8 H 93.4 90.9   MCH 29.3 28.8 29.0   MCHC 30.0 L 30.9 L 32.0     CMP:  Recent Labs   Lab 12/12/24  0715 05/20/25  1014 06/24/25  1536   Glucose 94 88 110   Calcium 9.2 9.5 9.3   Albumin 4.0 4.6 4.3   Total Protein 6.9 7.5 7.6   Sodium 138 137 137   Potassium 5.4 H 5.4 H 4.4   CO2 25 22 L 24   Chloride 107 104 105   BUN 20 17 20   Alk Phos 138 85 79   ALT 17 20 16   AST 35 H 25 23   Bilirubin, Total 0.3 0.7 0.5     LIPIDS:  Recent Labs   Lab 07/02/24  0857 12/12/24  0715 " 25  1014   HDL Cholesterol 56 56 68 H   Cholesterol 185 165 195   Triglycerides 107 105 93   LDL Calculated 108 88 108   Cholesterol/HDL Ratio (Risk Factor) 3.3 2.9 2.9   Non- 109 127     TSH:      A1C:        Assessment/Plan     Janene Castillo is a 65 y.o.female with:     1. Abdominal pain, unspecified abdominal location  -     X-Ray KUB; Future; Expected date: 2025  -     CBC Auto Differential; Future; Expected date: 2025  -     Comprehensive Metabolic Panel; Future; Expected date: 2025    Other orders  -     Cancel: Creatinine, serum       Awaiting results of xray and lab work  Collection cups given to patient for occult blood    Total time spent face-to-face and non-face-to-face coordinating care for this encounter was: 20 minutes     Chronic conditions status updated as per HPI.  Other than changes above, cont current medications and maintain follow up with specialists.  Return to clinic prn if symptoms worsen or fail to improve.    Mariah Ellis, P  [facility]         [1]   Social History  Tobacco Use    Smoking status: Former     Current packs/day: 0.00     Average packs/day: 1 pack/day for 44.0 years (44.0 ttl pk-yrs)     Types: Cigarettes     Start date:      Quit date:      Years since quittin.4     Passive exposure: Past    Smokeless tobacco: Never   Substance Use Topics    Alcohol use: Not Currently    Drug use: Never   [2]   Outpatient Encounter Medications as of 2025   Medication Sig Dispense Refill    amLODIPine (NORVASC) 2.5 MG tablet Take 1 tablet (2.5 mg total) by mouth once daily. 90 tablet 1    benazepriL (LOTENSIN) 20 MG tablet Take 1 tablet (20 mg total) by mouth once daily. 90 tablet 1    buPROPion (WELLBUTRIN) 100 MG tablet Take 1 tablet by mouth twice daily 180 tablet 1    calcium carbonate (OS-CAIN) 600 mg calcium (1,500 mg) Tab Take 600 mg by mouth once daily.      coenzyme Q10 100 mg capsule as directed Orally      evolocumab  (REPATHA SURECLICK) 140 mg/mL PnIj Inject 1 mL (140 mg total) into the skin every 14 (fourteen) days. 2 mL 5    ezetimibe (ZETIA) 10 mg tablet Take 1 tablet (10 mg total) by mouth every evening. 90 tablet 1    mometasone (NASONEX) 50 mcg/actuation nasal spray 2 sprays by Nasal route once daily. 17 g 5    mupirocin (BACTROBAN) 2 % ointment Apply to affected areas TID 60 g 2    ondansetron (ZOFRAN) 4 MG tablet Take 1 tablet (4 mg total) by mouth every 8 (eight) hours as needed for Nausea. 30 tablet 5    oxyCODONE-acetaminophen (PERCOCET)  mg per tablet Take 1 tablet by mouth 3 (three) times daily as needed.      RINVOQ 15 mg 24 hr tablet Take 1 tablet (15 mg total) by mouth once daily. 30 tablet 11    tiZANidine (ZANAFLEX) 4 MG tablet Take 1 tablet (4 mg total) by mouth 2 (two) times daily. 180 tablet 1    valACYclovir (VALTREX) 1000 MG tablet TAKE 2 TABLETS BY MOUTH TWICE DAILY AS NEEDED 4 tablet 0    varenicline tartrate (CHANTIX) 1 mg Tab Take 1 tablet by mouth twice daily 180 tablet 0    zinc gluconate 50 mg tablet Take 50 mg by mouth once daily.       No facility-administered encounter medications on file as of 6/24/2025.

## 2025-06-26 ENCOUNTER — RESULTS FOLLOW-UP (OUTPATIENT)
Dept: FAMILY MEDICINE | Facility: CLINIC | Age: 65
End: 2025-06-26

## 2025-06-27 ENCOUNTER — TELEPHONE (OUTPATIENT)
Dept: FAMILY MEDICINE | Facility: CLINIC | Age: 65
End: 2025-06-27
Payer: MEDICARE

## 2025-06-27 NOTE — TELEPHONE ENCOUNTER
"Returned patient's call. Patient stated she was seen by JANET Ortiz at our clinic this past tues, 06/24/25, and was given the "3 sample collection things". She states she was able to "go a little bit" tues evening when she got back home but that she has not had a bm since then and was asking for help/advice on what she needs to do.    Consulted with Mariah Ellis whom recommended for the patient to try a laxative over-the-counter and to follow back up with us. Informed patient of Mariah's suggestion, she verbalized understanding. Patient then asked what does she need to do with it if she is able to have a bm over the weekend. I clarified with the patient of what she was given tues at the clinic, 3 cards or something else? Patient verbalized she was given 3 small bowls to collect her stool and that was why she was asking what she needed to do with the sample if she is able to have a bm over the weekend.    Consulted with Sunita, our , of what the patient needed to do. Sunita stated the patient needed to put bowl on ice. Informed the patient of what Sunita suggested and she verbalized understanding.   "

## 2025-06-27 NOTE — TELEPHONE ENCOUNTER
----- Message from Shannon sent at 6/27/2025  8:48 AM CDT -----  Regarding: Stool Sample Question  Contact: Patient  Pt needs: a nurse call back.        Phone #: [phone] (M)

## 2025-06-30 ENCOUNTER — TELEPHONE (OUTPATIENT)
Dept: FAMILY MEDICINE | Facility: CLINIC | Age: 65
End: 2025-06-30
Payer: MEDICARE

## 2025-06-30 NOTE — TELEPHONE ENCOUNTER
Notified patient of results. Patient voiced understanding.   What Is The Reason For Today's Visit?: Upper Body Skin Exam Additional History: The patient has not noticed any changing moles or any tender or bleeding spots.

## 2025-06-30 NOTE — TELEPHONE ENCOUNTER
----- Message from JANET Ortiz sent at 6/26/2025  3:22 PM CDT -----  Xray is normal  ----- Message -----  From: Marli Rad Results In  Sent: 6/25/2025   8:14 AM CDT  To: JANET Franklin

## 2025-07-03 ENCOUNTER — TELEPHONE (OUTPATIENT)
Dept: FAMILY MEDICINE | Facility: CLINIC | Age: 65
End: 2025-07-03
Payer: MEDICARE

## 2025-07-03 DIAGNOSIS — R10.9 ABDOMINAL PAIN, UNSPECIFIED ABDOMINAL LOCATION: Primary | ICD-10-CM

## 2025-07-05 DIAGNOSIS — I10 HYPERTENSION, UNSPECIFIED TYPE: ICD-10-CM

## 2025-07-07 ENCOUNTER — TELEPHONE (OUTPATIENT)
Dept: FAMILY MEDICINE | Facility: CLINIC | Age: 65
End: 2025-07-07
Payer: MEDICARE

## 2025-07-07 ENCOUNTER — RESULTS FOLLOW-UP (OUTPATIENT)
Dept: FAMILY MEDICINE | Facility: CLINIC | Age: 65
End: 2025-07-07
Payer: MEDICARE

## 2025-07-07 DIAGNOSIS — R19.5 POSITIVE OCCULT STOOL BLOOD TEST: Primary | ICD-10-CM

## 2025-07-07 RX ORDER — AMLODIPINE BESYLATE 2.5 MG/1
2.5 TABLET ORAL
Qty: 90 TABLET | Refills: 0 | Status: SHIPPED | OUTPATIENT
Start: 2025-07-07

## 2025-07-07 NOTE — TELEPHONE ENCOUNTER
----- Message from JANET Ortiz sent at 7/7/2025 12:57 PM CDT -----  Notify patient of + occult blood testing; Referring patient to GI  ----- Message -----  From: Lab, Background User  Sent: 7/3/2025   4:06 PM CDT  To: JANET Franklin

## 2025-07-18 ENCOUNTER — OFFICE VISIT (OUTPATIENT)
Dept: FAMILY MEDICINE | Facility: CLINIC | Age: 65
End: 2025-07-18
Payer: MEDICARE

## 2025-07-18 VITALS
WEIGHT: 195.63 LBS | OXYGEN SATURATION: 96 % | DIASTOLIC BLOOD PRESSURE: 60 MMHG | SYSTOLIC BLOOD PRESSURE: 110 MMHG | HEIGHT: 67 IN | HEART RATE: 69 BPM | TEMPERATURE: 98 F | BODY MASS INDEX: 30.71 KG/M2

## 2025-07-18 DIAGNOSIS — Z13.1 SCREENING FOR DIABETES MELLITUS: ICD-10-CM

## 2025-07-18 DIAGNOSIS — Z11.4 SCREENING FOR HIV (HUMAN IMMUNODEFICIENCY VIRUS): ICD-10-CM

## 2025-07-18 DIAGNOSIS — Z87.891 HISTORY OF SMOKING 30 OR MORE PACK YEARS: ICD-10-CM

## 2025-07-18 DIAGNOSIS — E78.5 HYPERLIPIDEMIA, UNSPECIFIED HYPERLIPIDEMIA TYPE: Primary | ICD-10-CM

## 2025-07-18 DIAGNOSIS — Z79.899 OTHER LONG TERM (CURRENT) DRUG THERAPY: ICD-10-CM

## 2025-07-18 NOTE — PROGRESS NOTES
Pt here in clinic originally for a one month follow up. However, she reports that she has not started the repatha at this time due to not being able to self administer the medication safely. She reports that she did bring the medication to the clinic one day for education, however, she reports she was notified that the clinical staff was at lunch and could not see her until after they returned to clinic. She reports that she did attempt to administer her first injection herself, and was not successful and ended up wasting her medication. Dr Huizar consulted, he advised to have patient receive injection today, and then return to clinic in 2 weeks for a nurse visit for another injection and then have a BMP collected 3 to 4 days after second injection and then to return to clinic around one week after labs. Ms. Moore notified and she voiced understanding and agreed. She requested a new rx for the repatha solution to be sent in so she could attempt to safely administer the medication herself. Consulted with Dr Huizar. He advised to continue with the autoinjector at this time due to possibility of PA and then consider other options at a later date. Ms. Moore notified and she voiced understanding. Repatha administered in patients left upper arm in the posterior aspect with well toleration. Dr Huizar made aware of the above and that patient agreed to hiv and A1c screening which were on her care gaps and a LDCT scan. Smoke history was reviewed during rooming. Labs have placed as standing.

## 2025-07-25 DIAGNOSIS — F17.200 SMOKER: ICD-10-CM

## 2025-07-25 RX ORDER — VARENICLINE TARTRATE 1 MG/1
1 TABLET, FILM COATED ORAL 2 TIMES DAILY
Qty: 180 TABLET | Refills: 0 | Status: SHIPPED | OUTPATIENT
Start: 2025-07-25

## 2025-07-30 ENCOUNTER — OFFICE VISIT (OUTPATIENT)
Dept: DERMATOLOGY | Facility: CLINIC | Age: 65
End: 2025-07-30
Payer: MEDICARE

## 2025-07-30 DIAGNOSIS — L40.9 PSORIASIS: ICD-10-CM

## 2025-07-30 DIAGNOSIS — L82.1 SK (SEBORRHEIC KERATOSIS): ICD-10-CM

## 2025-07-30 DIAGNOSIS — Z79.899 HIGH RISK MEDICATION USE: ICD-10-CM

## 2025-07-30 DIAGNOSIS — L40.50 PSORIATIC ARTHRITIS: ICD-10-CM

## 2025-07-30 DIAGNOSIS — L08.9 SKIN INFECTION: ICD-10-CM

## 2025-07-30 DIAGNOSIS — L57.8 OTHER SKIN CHANGES DUE TO CHRONIC EXPOSURE TO NONIONIZING RADIATION: Primary | ICD-10-CM

## 2025-07-30 DIAGNOSIS — L57.0 AK (ACTINIC KERATOSIS): ICD-10-CM

## 2025-07-30 DIAGNOSIS — L23.5 ALLERGIC DERMATITIS DUE TO OTHER CHEMICAL PRODUCT: ICD-10-CM

## 2025-07-30 DIAGNOSIS — L28.1 PRURIGO NODULARIS: ICD-10-CM

## 2025-07-30 PROCEDURE — 87070 CULTURE OTHR SPECIMN AEROBIC: CPT | Mod: ,,, | Performed by: CLINICAL MEDICAL LABORATORY

## 2025-07-30 NOTE — PROGRESS NOTES
[facility]   Tatum Perry MD    Patient Name: Janene Castillo  Patient YOB: 1960   Date of Service: 25    CC: Full Skin Exam    HPI: Janene Castillo is a 65 y.o. female presents today for a full skin exam.  Patient was last seen 2025 and dermatologic history includes AK and psoriasis. Patient has no concerned at this time.     Past Medical History:   Diagnosis Date    Allergic contact dermatitis     balsam of peru, bacitracin, fragrance, methyldibromo glutaronite    Anemia     Anxiety     Chronic pain     Depression     Fever blister     HTN (hypertension)     Hyperlipidemia     Joint pain     Plaque psoriasis     Prurigo nodularis     Psoriatic arthritis     Skin disease     Stage 3b chronic kidney disease      Past Surgical History:   Procedure Laterality Date    ANGIOGRAM, CORONARY, WITH LEFT HEART CATHETERIZATION N/A 2022    Procedure: Angiogram, Coronary, with Left Heart Cath;  Surgeon: Jason Ratliff MD;  Location: [facility] CATH LAB;  Service: Cardiology;  Laterality: N/A;     SECTION      SKIN BIOPSY       Review of patient's allergies indicates:   Allergen Reactions    Bacitracin Dermatitis    Codeine sulfate     Codeine Rash     Rash     Methyldibromoglutaronitrile Dermatitis    Permethrin Rash     Rash     Sulfa (sulfonamide antibiotics) Other (See Comments) and Rash     unknown     Current Medications[1]    ROS: A focused review of systems was obtained and negative.     Exam: A full skin exam was performed including scalp, hair, face, neck, chest, back, abdomen, right arm, left arm, right hand, left hand, nails, right leg, and left leg.  All areas examined were normal expect as per below in assessment and plan.  General Appearance of the patient is well developed and well nourished.  Orientation: alert and oriented x 3.  Mood and affect: pleasant.    Assessment:   The primary encounter diagnosis was Other skin changes due to chronic exposure to nonionizing  radiation. Diagnoses of SK (seborrheic keratosis), Skin infection, Psoriasis, AK (actinic keratosis), Psoriatic arthritis, High risk medication use, Allergic dermatitis due to other chemical product, and Prurigo nodularis were also pertinent to this visit.    Plan:        Seborrheic Keratosis (L82.1)  - Stuck-on, warty, greasy brown papule with pseudo-horn cysts scattered on the trunk and extremities    Plan: Counseling.  I counseled the patient regarding the following:  Skin Care: Seborrheic Keratoses are benign. No treatment is necessary.  Expectations: Seborrheic Keratoses are benign warty growths. Patients get more of them as they age    Plan: Reassurance      Actinic Keratoses(L57.0)  - Erythematous patches and papules with hyperkeratotic scale distributed on the right brow.    Plan: Counseling.  I counseled the patient regarding the following:  Skin Care: Sun protective clothing and broad spectrum sunscreen can prevent the formation of Actinic  Keratoses. AKs can resolve with cryotherapy, photodynamic therapy, imiquimod, topical 5-FU.  Expectations: Actinic Keratoses are precancerous proliferations that occur within sun damaged skin. If untreated,  a small subset of AKs can develop into Squamous Cell Carcinoma.  Contact Office if: If AKs fail to resolve despite treatment, or if you develop a side effect from therapy, such as  unbearable crusting, scabbing, redness and tenderness.    Plan: Liquid Nitrogen.  A total of 1 lesions were treated with liquid nitrogen for 2 freeze-thaw cycles lasting 5 seconds, located on the above locations.   The patient's consent was obtained including but not limited to risks of crusting, scabbing,  blistering, scarring, darker or lighter pigmentary change, recurrence, incomplete removal and infection.      Skin Infection, NOS   - crusting of the right temple    Plan: Counseling  I counseled the patient regarding the following:  Skin care: Patients with purulence or fluid  collections should have their wounds re-opened, drained, cultured and irrigated. All wound infections should be treated with antibiotics.  Expectations: Wound Infections usually occur 4-7 days postoperatively. Patients exhibit, pain, rednes, swelling, cellulitic changes and fever.  Contact Office if: Wound Infection fails to respond to treatment or worsens, patient develops a fever, or if redness spreads despite antibiotics.    A bacterial culture and HSV PCR were obtained from the right preauricular cheek       Plaque Psoriasis  - clear  Status: Improved      Plan: Counseling  I counseled the patient regarding the following:  Skin care: Emollients, ambient sun exposure, shampoos with tar, selenium or zinc pyrithione can improve psoriasis.  Expectations: Psoriasis is chronic in nature with periods of remissions and flares. Flares can be triggered by stress, infections (group A strep), certain medications and alcohol.  Contact office if: Psoriasis worsens, or fails to improve despite several months of treatment.    - continue Rinvoq      Other Skin Changes Due to Chronic Exposure of Nonionizing Radiation (L57.8)    Plan: Monitoring.     Plan: Sunscreen Recommendations.  I recommended a broad spectrum sunscreen with a SPF of 30 or higher. I explained that SPF 30 sunscreens block approximately 97 percent of the  sun's harmful rays. Sunscreens should be applied at least 15 minutes prior to expected sun exposure and then every 2 hours after that as long as  sun exposure continues. If swimming or exercising sunscreen should be reapplied every 45 minutes to an hour after getting wet or sweating. One  ounce, or the equivalent of a shot glass full of sunscreen, is adequate to protect the skin not covered by a bathing suit. I also recommended a lip  balm with a sunscreen as well. Sun protective clothing can be used in lieu of sunscreen but must be worn the entire time you are exposed to the  sun's rays.    Psoriatic  Arthritis  - clear  Status: stable    Plan: Counseling.  I counseled the patient regarding the following:  Instructions: Systemic medications are the treatment of choice for psoriatic arthritis. Treatment options include  anti-TNF therapy and methotrexate.  Expectations: Psoriatic arthritis is a type of inflammatory arthritis which occurs in conjunction with psoriasis.  Approximately 5% of psoriasis patients develop psoriatic arthritis. Psoriatic arthritis is chronic in nature with  periods of remissions and flares. Flares can be triggered by stress, infections (group A strep), certain medications  and alcohol. Psoriatic arthritis requires systemic medication for adequate treatment.  Contact office if: Your psoriatic arthritis worsens, or fails to improve despite several months of treatment.    - continue rinvoq and follow up with Nazia Cárdenas NP    Allergic Contact Dermatitis (L23.89)  - clear  Status: stable    Plan: Counseling.  I counseled the patient regarding the following:  Skin care: Patient should use hypoallergenic products such as unscented soaps. Eliminate exposure to all new  cosmetics, fragrances, hair products, nail products shampoos, scented soaps, plants, metals and sunscreens.  Expectations: Allergic contact dermatitis can persist for several weeks before it fully resolves. Sometimes, patch  testing is necessary if reactions persist or if the patient is in contact with several potential allergens.  Contact office if: Allergic contact dermatitis worsens or fails to improve despite several weeks of treatment.    - continue to follow camp list per patch testing with Dr. Novoa    High Risk Medication Monitoring (Z79.899) : The risks and benefits of the medication were reviewed in full with the patient. Should any side effects occur, the patient will stop the medication and contact me immediately.    Rinvoq counseling: I discussed with patient potential side effects including lowered immune system,  infections, cardiovascular risk including heart attack and stroke, allergic reactions, blot clots, cancer, and tears in the stomach or intestines.  I discussed lab monitoring with patient.  Patient will call with any side effects including symptoms of infection.     - reviewed recent labs    Prurigo Nodularis  - erythematous nodules with central erosions located on the forearm  Severity: moderate  Estimated nodule count: 10    Plan: Counseling  I counseled the patient regarding the following:  Skin care: Recommend trimming nails short, anti-itch lotions such as Sarna, topical steroids and antihistamines.  Expectations: Prurigo Nodularis is a self-inflicted lesion that results from picking or rubbing the same spot of skin over and over again. If the itch-scratch cycle is broken, the lesions will resolve.  Contact office if: Prurigo Nodularis worsens, or if itching is accompanied by constitutional symptoms.      Follow up in about 6 months (around 1/30/2026) for Psoriasis FU.    Tatum Perry MD           [1]   Current Outpatient Medications:     amLODIPine (NORVASC) 2.5 MG tablet, Take 1 tablet by mouth once daily, Disp: 90 tablet, Rfl: 0    benazepriL (LOTENSIN) 20 MG tablet, Take 1 tablet (20 mg total) by mouth once daily., Disp: 90 tablet, Rfl: 1    buPROPion (WELLBUTRIN) 100 MG tablet, Take 1 tablet by mouth twice daily, Disp: 180 tablet, Rfl: 1    calcium carbonate (OS-CAIN) 600 mg calcium (1,500 mg) Tab, Take 600 mg by mouth once daily., Disp: , Rfl:     coenzyme Q10 100 mg capsule, as directed Orally, Disp: , Rfl:     ezetimibe (ZETIA) 10 mg tablet, Take 1 tablet (10 mg total) by mouth every evening., Disp: 90 tablet, Rfl: 1    mometasone (NASONEX) 50 mcg/actuation nasal spray, 2 sprays by Nasal route once daily., Disp: 17 g, Rfl: 5    mupirocin (BACTROBAN) 2 % ointment, Apply to affected areas TID, Disp: 60 g, Rfl: 2    ondansetron (ZOFRAN) 4 MG tablet, Take 1 tablet (4 mg total) by mouth every 8 (eight) hours as  needed for Nausea., Disp: 30 tablet, Rfl: 5    oxyCODONE-acetaminophen (PERCOCET)  mg per tablet, Take 1 tablet by mouth 3 (three) times daily as needed., Disp: , Rfl:     RINVOQ 15 mg 24 hr tablet, Take 1 tablet (15 mg total) by mouth once daily., Disp: 30 tablet, Rfl: 11    tiZANidine (ZANAFLEX) 4 MG tablet, Take 1 tablet (4 mg total) by mouth 2 (two) times daily., Disp: 180 tablet, Rfl: 1    valACYclovir (VALTREX) 1000 MG tablet, TAKE 2 TABLETS BY MOUTH TWICE DAILY AS NEEDED, Disp: 4 tablet, Rfl: 0    varenicline tartrate (CHANTIX) 1 mg Tab, Take 1 tablet by mouth twice daily, Disp: 180 tablet, Rfl: 0    zinc gluconate 50 mg tablet, Take 50 mg by mouth once daily., Disp: , Rfl:

## 2025-07-30 NOTE — PROGRESS NOTES
"Annual Exam    Assessment/Plan:  65 y.o.  presenting for her annual exam:    Problem List Items Addressed This Visit    None  Visit Diagnoses         Screening for malignant neoplasm of the cervix    -  Primary    Relevant Orders    ThinPrep Pap Test      Encounter for screening colonoscopy          Screening for cervical cancer          Atrophic vaginitis        Relevant Medications    estradioL (ESTRACE) 0.01 % (0.1 mg/gram) vaginal cream      Encounter for gynecological examination (general) (routine) with abnormal findings          Post-void dribbling              Annual exam with PAP performed.   F/u for annual mammograms as scheduled.  F/u for colon cancer screens as scheduled.  F/u with primary care as scheduled.     RTC in 1 year for annual exam and/or prn.     For atrophic vaginitis--Estradiol cream prescribed.  Discussed option of pelvic floor PT for urinary complaints. She is going to wait until she talks to financial assistance prior to making any other referrals.       CC:   Chief Complaint   Patient presents with    Annual Exam     Pt in for annual exam  and denies any problems or concerns       HPI:  65 y.o.  presents for her gynecologic annual exam.    Patient seen and examined.  States she empties her bladder and has urinary frequency but notices a "dribble" still comes out. Started about 6 months ago. Use to use vaginal cream but doesn't like it. Discussed options for urinary issues. Offered info on financial assistance to see if she qualifies. Then will determine what steps to take. Mammogram is scheduled. Referred to GI for blood in stool by PCP.    Health Maintenance:    Birth control: Postmenopausal  Pregnancy plans: none   Safe relationship: ?  Healthy diet: ?   Exercise: ?  PCP: Asael Huizar MD     Screening:  Last pap smear: 2023- negative  History of abnormal pap smears: Denies  STI screening: Declines  Mammogram: 2023- negative; scheduled " 2025  Colonoscopy or colon cancer screenin2020- abnormal Cologuard; Ordered    Review of Systems: The following ROS was otherwise negative, except as noted in the HPI:  constitutional, HEENT, respiratory, cardiovascular, gastrointestinal, genitourinary, skin, musculoskeletal, neurological, psych    Primary Care Physician: Asael Huizar MD    Obstetric History  OB History    Para Term  AB Living   4 3 3      SAB IAB Ectopic Multiple Live Births             # Outcome Date GA Lbr Sean/2nd Weight Sex Type Anes PTL Lv   4             3 Term            2 Term            1 Term                Gynecologic History  Menstrual History:   LMP: Unknown    Age of Menarche: 16   Age of Menopause: ?      Sexual History:    Contraception: see above   Currently ? sexually active   Denies history of STIs   Denies sexual problems    Pap History:   History of abnormal pap smears: see above   Last pap: see above    Medical History:  Past Medical History:   Diagnosis Date    Allergic contact dermatitis     balsam of peru, bacitracin, fragrance, methyldibromo glutaronite    Anemia     Anxiety     Chronic pain     Depression     Fever blister     HTN (hypertension)     Hyperlipidemia     Joint pain     Plaque psoriasis     Prurigo nodularis     Psoriatic arthritis     Skin disease     Stage 3b chronic kidney disease        Medications:  Medication List with Changes/Refills   New Medications    ESTRADIOL (ESTRACE) 0.01 % (0.1 MG/GRAM) VAGINAL CREAM    Place 1 g vaginally twice a week.   Current Medications    AMLODIPINE (NORVASC) 2.5 MG TABLET    Take 1 tablet by mouth once daily    BENAZEPRIL (LOTENSIN) 20 MG TABLET    Take 1 tablet (20 mg total) by mouth once daily.    BUPROPION (WELLBUTRIN) 100 MG TABLET    Take 1 tablet by mouth twice daily    CALCIUM CARBONATE (OS-CAIN) 600 MG CALCIUM (1,500 MG) TAB    Take 600 mg by mouth once daily.    COENZYME Q10 100 MG CAPSULE    as directed Orally     EZETIMIBE (ZETIA) 10 MG TABLET    Take 1 tablet (10 mg total) by mouth every evening.    MOMETASONE (NASONEX) 50 MCG/ACTUATION NASAL SPRAY    2 sprays by Nasal route once daily.    MUPIROCIN (BACTROBAN) 2 % OINTMENT    Apply to affected areas TID    ONDANSETRON (ZOFRAN) 4 MG TABLET    Take 1 tablet (4 mg total) by mouth every 8 (eight) hours as needed for Nausea.    OXYCODONE-ACETAMINOPHEN (PERCOCET)  MG PER TABLET    Take 1 tablet by mouth 3 (three) times daily as needed.    REPATHA SURECLICK 140 MG/ML PNIJ    Inject into the skin.    RINVOQ 15 MG 24 HR TABLET    Take 1 tablet (15 mg total) by mouth once daily.    TIZANIDINE (ZANAFLEX) 4 MG TABLET    Take 1 tablet (4 mg total) by mouth 2 (two) times daily.    VALACYCLOVIR (VALTREX) 1000 MG TABLET    TAKE 2 TABLETS BY MOUTH TWICE DAILY AS NEEDED    VARENICLINE TARTRATE (CHANTIX) 1 MG TAB    Take 1 tablet by mouth twice daily    ZINC GLUCONATE 50 MG TABLET    Take 50 mg by mouth once daily.         Surgical History:  Past Surgical History:   Procedure Laterality Date    ANGIOGRAM, CORONARY, WITH LEFT HEART CATHETERIZATION N/A 2022    Procedure: Angiogram, Coronary, with Left Heart Cath;  Surgeon: Jason Ratliff MD;  Location: [facility] CATH LAB;  Service: Cardiology;  Laterality: N/A;     SECTION      SKIN BIOPSY         Allergies:  Review of patient's allergies indicates:   Allergen Reactions    Bacitracin Dermatitis    Codeine sulfate     Codeine Rash     Rash     Methyldibromoglutaronitrile Dermatitis    Permethrin Rash     Rash     Sulfa (sulfonamide antibiotics) Other (See Comments) and Rash     unknown       Family History:  Family History   Problem Relation Name Age of Onset    Hypertension Mother Annelise Noriega     Heart disease Father Rober Jesusanastasia     Heart attack Father Rober Hari     Heart failure Father Rober Hari     Pacemaker/defibrilator Father Roebr Hari     Diabetes Father Rober Hari     Arthritis Maternal Grandmother Adrianne  Reynolds     Melanoma Neg Hx         Social History:  Social History     Socioeconomic History    Marital status:    Tobacco Use    Smoking status: Former     Current packs/day: 0.00     Average packs/day: 1 pack/day for 44.0 years (44.0 ttl pk-yrs)     Types: Cigarettes     Start date:      Quit date:      Years since quittin.5     Passive exposure: Past    Smokeless tobacco: Never   Substance and Sexual Activity    Alcohol use: Not Currently    Drug use: Never     Social Drivers of Health     Financial Resource Strain: High Risk (2022)    Overall Financial Resource Strain (CARDIA)     Difficulty of Paying Living Expenses: Very hard   Food Insecurity: Food Insecurity Present (2022)    Hunger Vital Sign     Worried About Running Out of Food in the Last Year: Often true     Ran Out of Food in the Last Year: Often true   Transportation Needs: No Transportation Needs (2022)    PRAPARE - Transportation     Lack of Transportation (Medical): No     Lack of Transportation (Non-Medical): No   Physical Activity: Insufficiently Active (2022)    Exercise Vital Sign     Days of Exercise per Week: 4 days     Minutes of Exercise per Session: 20 min   Stress: Stress Concern Present (2022)    [employer] - Occupational Stress Questionnaire     Feeling of Stress : Very much   Housing Stability: High Risk (2022)    Housing Stability Vital Sign     Unable to Pay for Housing in the Last Year: No     Number of Places Lived in the Last Year: 2     Unstable Housing in the Last Year: Yes       Objective:  Body mass index is 30.54 kg/m².    /81   Pulse 64   Wt 88.5 kg (195 lb)   BMI 30.54 kg/m²     General: Alert, well appearing, no acute distress  Head: Normocephalic, atraumatic  Breasts: Symmetric, non-tender to palpation, no skin changes, palpable axillary lymph nodes or masses noted  Lungs: Unlabored respirations. Clear to auscultation  bilaterally.  Cardiovascular: Regular rate and rhythm.   Abdomen: Soft, nontender, nondistended   Pelvic: Exam chaperoned by female assistant.   External: normal female genitalia, no masses or lesions   Vagina: pale, pink mucosa with decreased rugae, minimal clear discharge.  Cervix: no masses or lesions, nontender. atrophic. PAP performed.  Uterus: approx 6 weeks, mobile, midline, nontender  Adnexa: no masses or fullness, nontender  Rectovaginal: deferred  Extremities: No redness or tenderness  Skin: Well perfused, normal coloration and turgor, no lesions or rashes visualized  Neuro: Alert, oriented, normal speech, no focal deficits, moves extremities appropriately  Psych: Normal mood and behavior.     Rob Horton MD

## 2025-07-31 ENCOUNTER — OFFICE VISIT (OUTPATIENT)
Dept: OBSTETRICS AND GYNECOLOGY | Facility: CLINIC | Age: 65
End: 2025-07-31
Payer: MEDICARE

## 2025-07-31 VITALS
WEIGHT: 195 LBS | HEART RATE: 64 BPM | BODY MASS INDEX: 30.54 KG/M2 | DIASTOLIC BLOOD PRESSURE: 81 MMHG | SYSTOLIC BLOOD PRESSURE: 127 MMHG

## 2025-07-31 DIAGNOSIS — Z12.11 ENCOUNTER FOR SCREENING COLONOSCOPY: ICD-10-CM

## 2025-07-31 DIAGNOSIS — Z01.411 ENCOUNTER FOR GYNECOLOGICAL EXAMINATION (GENERAL) (ROUTINE) WITH ABNORMAL FINDINGS: ICD-10-CM

## 2025-07-31 DIAGNOSIS — Z12.4 SCREENING FOR MALIGNANT NEOPLASM OF THE CERVIX: Primary | ICD-10-CM

## 2025-07-31 DIAGNOSIS — Z12.4 SCREENING FOR CERVICAL CANCER: ICD-10-CM

## 2025-07-31 DIAGNOSIS — N39.43 POST-VOID DRIBBLING: ICD-10-CM

## 2025-07-31 DIAGNOSIS — N95.2 ATROPHIC VAGINITIS: ICD-10-CM

## 2025-07-31 PROCEDURE — 99214 OFFICE O/P EST MOD 30 MIN: CPT | Mod: PBBFAC | Performed by: OBSTETRICS & GYNECOLOGY

## 2025-07-31 PROCEDURE — 99999 PR PBB SHADOW E&M-EST. PATIENT-LVL IV: CPT | Mod: PBBFAC,,, | Performed by: OBSTETRICS & GYNECOLOGY

## 2025-07-31 RX ORDER — EVOLOCUMAB 140 MG/ML
INJECTION, SOLUTION SUBCUTANEOUS
COMMUNITY
Start: 2025-07-29

## 2025-07-31 RX ORDER — ESTRADIOL 0.1 MG/G
1 CREAM VAGINAL
Qty: 42.5 G | Refills: 2 | Status: SHIPPED | OUTPATIENT
Start: 2025-07-31 | End: 2026-10-20

## 2025-08-01 ENCOUNTER — CLINICAL SUPPORT (OUTPATIENT)
Dept: FAMILY MEDICINE | Facility: CLINIC | Age: 65
End: 2025-08-01
Payer: MEDICARE

## 2025-08-01 DIAGNOSIS — R19.5 POSITIVE OCCULT STOOL BLOOD TEST: ICD-10-CM

## 2025-08-01 DIAGNOSIS — E78.5 HYPERLIPIDEMIA, UNSPECIFIED HYPERLIPIDEMIA TYPE: Primary | ICD-10-CM

## 2025-08-01 LAB — MICROORGANISM SPEC CULT: NORMAL

## 2025-08-01 NOTE — PROGRESS NOTES
"Pt here for second repatha injection. Pt reports she tolerated first repatha injection well with no side effects. Repatha injection was administered in the upper posterior aspect of patients left arm today with well toleration per Dr Huizar. She will report to [facility] main lab on Monday for follow up labs, which are already placed as standing.     While patient was in clinic today for her nurse visit, she reports that she was to see GI today due to positive hemoccult cards. However, she reported that she had seen a provider at [facility] before and stated, "The visit just didn't go well and I left before being seen. I would like to go somewhere else where I feel like I am heard." Voiced understanding. Dr Huizar notified and a new referral was placed externally. Referral center number provided to patient.     "

## 2025-08-04 DIAGNOSIS — B00.1 HERPES LABIALIS: ICD-10-CM

## 2025-08-05 RX ORDER — VALACYCLOVIR HYDROCHLORIDE 1 G/1
2000 TABLET, FILM COATED ORAL 2 TIMES DAILY PRN
Qty: 4 TABLET | Refills: 0 | OUTPATIENT
Start: 2025-08-05

## 2025-08-05 RX ORDER — VALACYCLOVIR HYDROCHLORIDE 1 G/1
2000 TABLET, FILM COATED ORAL 2 TIMES DAILY PRN
Qty: 4 TABLET | Refills: 2 | Status: SHIPPED | OUTPATIENT
Start: 2025-08-05

## 2025-08-05 NOTE — TELEPHONE ENCOUNTER
----- Message from Annelise sent at 8/5/2025 12:54 PM CDT -----  Regarding: Med Refill Request  Pt called requesting refill of valACYclovir (VALTREX) 1000 MG tablet    Pharmacy: [facility] on [address]

## 2025-08-07 ENCOUNTER — OFFICE VISIT (OUTPATIENT)
Dept: FAMILY MEDICINE | Facility: CLINIC | Age: 65
End: 2025-08-07
Payer: MEDICARE

## 2025-08-07 VITALS
RESPIRATION RATE: 18 BRPM | TEMPERATURE: 98 F | SYSTOLIC BLOOD PRESSURE: 104 MMHG | BODY MASS INDEX: 29.7 KG/M2 | HEIGHT: 67 IN | WEIGHT: 189.19 LBS | HEART RATE: 91 BPM | OXYGEN SATURATION: 99 % | DIASTOLIC BLOOD PRESSURE: 72 MMHG

## 2025-08-07 DIAGNOSIS — E78.5 HYPERLIPIDEMIA, UNSPECIFIED HYPERLIPIDEMIA TYPE: ICD-10-CM

## 2025-08-07 DIAGNOSIS — I10 PRIMARY HYPERTENSION: Primary | ICD-10-CM

## 2025-08-07 PROCEDURE — 1160F RVW MEDS BY RX/DR IN RCRD: CPT | Mod: ,,, | Performed by: FAMILY MEDICINE

## 2025-08-07 PROCEDURE — 99213 OFFICE O/P EST LOW 20 MIN: CPT | Mod: ,,, | Performed by: FAMILY MEDICINE

## 2025-08-07 PROCEDURE — 3074F SYST BP LT 130 MM HG: CPT | Mod: ,,, | Performed by: FAMILY MEDICINE

## 2025-08-07 PROCEDURE — 3078F DIAST BP <80 MM HG: CPT | Mod: ,,, | Performed by: FAMILY MEDICINE

## 2025-08-07 PROCEDURE — 3044F HG A1C LEVEL LT 7.0%: CPT | Mod: ,,, | Performed by: FAMILY MEDICINE

## 2025-08-07 PROCEDURE — 4010F ACE/ARB THERAPY RXD/TAKEN: CPT | Mod: ,,, | Performed by: FAMILY MEDICINE

## 2025-08-07 PROCEDURE — 3008F BODY MASS INDEX DOCD: CPT | Mod: ,,, | Performed by: FAMILY MEDICINE

## 2025-08-07 PROCEDURE — 1159F MED LIST DOCD IN RCRD: CPT | Mod: ,,, | Performed by: FAMILY MEDICINE

## 2025-08-07 NOTE — PROGRESS NOTES
Janene Castillo is a 65 y.o. female seen today for follow-up on recent lab work.  Her renal function was mildly impaired but consistent with previous readings.  We discussed continuing her hydration and avoidance of sodas.  Her A1c was in the normal range at 4.9.  She is tolerating the Repatha well and we will plan on rechecking her lipids in 3 months or so.    Past Medical History:   Diagnosis Date    Allergic contact dermatitis     balsam of peru, bacitracin, fragrance, methyldibromo glutaronite    Anemia     Anxiety     Chronic pain     Depression     Fever blister     HTN (hypertension)     Hyperlipidemia     Joint pain     Plaque psoriasis     Prurigo nodularis     Psoriatic arthritis     Skin disease     Stage 3b chronic kidney disease      Family History   Problem Relation Name Age of Onset    Hypertension Mother Annelise Noriega     Heart disease Father Rober Noriega     Heart attack Father oRber Noriega     Heart failure Father Rober Noriega     Pacemaker/defibrilator Father Rober Noriega     Diabetes Father Rober Noriega     Arthritis Maternal Grandmother Adrianne Reynolds     Melanoma Neg Hx       Medications Ordered Prior to Encounter[1]  Immunization History   Administered Date(s) Administered    COVID-19 MRNA, LN-S PF (MODERNA HALF 0.25 ML DOSE) 04/26/2022    COVID-19, MRNA, LN-S, PF (MODERNA FULL 0.5 ML DOSE) 06/22/2021, 08/06/2021    COVID-19, mRNA, LNP-S, bivalent booster, PF (Moderna Omicron)12 + YEARS 11/16/2022    Influenza - Quadrivalent - PF *Preferred* (6 months and older) 10/18/2022    Influenza - Trivalent - Fluarix, Flulaval, Fluzone, Afluria - PF 10/07/2024    Tdap 01/20/2024       Review of Systems   Constitutional:  Negative for fever, malaise/fatigue and weight loss.   Respiratory:  Negative for shortness of breath.    Cardiovascular:  Negative for chest pain and palpitations.   Gastrointestinal:  Negative for nausea and vomiting.   Musculoskeletal:  Positive for joint pain and myalgias.    Psychiatric/Behavioral:  Negative for depression.         Vitals:    08/07/25 1107   BP: 104/72   Pulse: 91   Resp: 18   Temp: 98.4 °F (36.9 °C)       Physical Exam  Vitals reviewed.   Eyes:      Conjunctiva/sclera: Conjunctivae normal.   Pulmonary:      Effort: Pulmonary effort is normal.   Neurological:      Mental Status: She is alert.   Psychiatric:         Mood and Affect: Mood normal.         Behavior: Behavior normal.         Thought Content: Thought content normal.         Judgment: Judgment normal.          Assessment and Plan  1. Primary hypertension  -     CBC Auto Differential; Future; Expected date: 11/07/2025  -     Comprehensive Metabolic Panel; Future; Expected date: 11/07/2025    2. Hyperlipidemia, unspecified hyperlipidemia type  -     Lipid Panel; Future; Expected date: 11/07/2025             Return to clinic in 3 months for follow-up blood work and then an office visit or as needed.  Patient also suffers from chronic joint pain in his considering pain management at [facility].  She will let us know if she needs a referral.    Health Maintenance Topics with due status: Not Due       Topic Last Completion Date    Colorectal Cancer Screening 12/22/2020    TETANUS VACCINE 01/20/2024    Annual UACr 10/01/2024    Influenza Vaccine 10/07/2024    Lipid Panel 05/20/2025    Cervical Cancer Screening 07/31/2025    Hemoglobin A1c (Diabetic Prevention Screening) 08/05/2025              [1]   Current Outpatient Medications on File Prior to Visit   Medication Sig Dispense Refill    amLODIPine (NORVASC) 2.5 MG tablet Take 1 tablet by mouth once daily 90 tablet 0    benazepriL (LOTENSIN) 20 MG tablet Take 1 tablet (20 mg total) by mouth once daily. 90 tablet 1    buPROPion (WELLBUTRIN) 100 MG tablet Take 1 tablet by mouth twice daily 180 tablet 1    calcium carbonate (OS-CAIN) 600 mg calcium (1,500 mg) Tab Take 600 mg by mouth once daily.      coenzyme Q10 100 mg capsule as directed Orally      estradioL  (ESTRACE) 0.01 % (0.1 mg/gram) vaginal cream Place 1 g vaginally twice a week. 42.5 g 2    ezetimibe (ZETIA) 10 mg tablet Take 1 tablet (10 mg total) by mouth every evening. 90 tablet 1    mupirocin (BACTROBAN) 2 % ointment Apply to affected areas TID 60 g 2    ondansetron (ZOFRAN) 4 MG tablet Take 1 tablet (4 mg total) by mouth every 8 (eight) hours as needed for Nausea. 30 tablet 5    oxyCODONE-acetaminophen (PERCOCET)  mg per tablet Take 1 tablet by mouth 3 (three) times daily as needed.      REPATHA SURECLICK 140 mg/mL PnIj Inject into the skin.      RINVOQ 15 mg 24 hr tablet Take 1 tablet (15 mg total) by mouth once daily. 30 tablet 11    tiZANidine (ZANAFLEX) 4 MG tablet Take 1 tablet (4 mg total) by mouth 2 (two) times daily. 180 tablet 1    valACYclovir (VALTREX) 1000 MG tablet Take 2 tablets (2,000 mg total) by mouth 2 (two) times daily as needed (break out). 4 tablet 2    varenicline tartrate (CHANTIX) 1 mg Tab Take 1 tablet by mouth twice daily 180 tablet 0    zinc gluconate 50 mg tablet Take 50 mg by mouth once daily.      mometasone (NASONEX) 50 mcg/actuation nasal spray 2 sprays by Nasal route once daily. (Patient not taking: Reported on 8/7/2025) 17 g 5     No current facility-administered medications on file prior to visit.

## 2025-08-11 ENCOUNTER — CLINICAL SUPPORT (OUTPATIENT)
Dept: FAMILY MEDICINE | Facility: CLINIC | Age: 65
End: 2025-08-11
Payer: MEDICARE

## 2025-08-11 ENCOUNTER — TELEPHONE (OUTPATIENT)
Dept: FAMILY MEDICINE | Facility: CLINIC | Age: 65
End: 2025-08-11
Payer: MEDICARE

## 2025-08-11 DIAGNOSIS — Z71.9 COUNSELED BY NURSE: Primary | ICD-10-CM

## 2025-08-13 ENCOUNTER — HOSPITAL ENCOUNTER (OUTPATIENT)
Dept: RADIOLOGY | Facility: HOSPITAL | Age: 65
Discharge: HOME | End: 2025-08-13
Attending: FAMILY MEDICINE
Payer: MEDICARE

## 2025-08-13 VITALS — WEIGHT: 189 LBS | BODY MASS INDEX: 29.66 KG/M2 | HEIGHT: 67 IN

## 2025-08-13 DIAGNOSIS — Z87.891 HISTORY OF SMOKING 30 OR MORE PACK YEARS: ICD-10-CM

## 2025-08-13 PROCEDURE — 71271 CT THORAX LUNG CANCER SCR C-: CPT | Mod: TC

## 2025-08-14 ENCOUNTER — TELEPHONE (OUTPATIENT)
Dept: FAMILY MEDICINE | Facility: CLINIC | Age: 65
End: 2025-08-14
Payer: MEDICARE

## 2025-08-18 ENCOUNTER — TELEPHONE (OUTPATIENT)
Dept: FAMILY MEDICINE | Facility: CLINIC | Age: 65
End: 2025-08-18
Payer: MEDICARE

## 2025-08-18 DIAGNOSIS — B00.1 HERPES LABIALIS: ICD-10-CM

## 2025-08-19 ENCOUNTER — TELEPHONE (OUTPATIENT)
Dept: DERMATOLOGY | Facility: CLINIC | Age: 65
End: 2025-08-19
Payer: MEDICARE

## 2025-08-19 ENCOUNTER — TELEPHONE (OUTPATIENT)
Dept: FAMILY MEDICINE | Facility: CLINIC | Age: 65
End: 2025-08-19
Payer: MEDICARE

## 2025-08-19 RX ORDER — VALACYCLOVIR HYDROCHLORIDE 1 G/1
2000 TABLET, FILM COATED ORAL 2 TIMES DAILY PRN
Qty: 4 TABLET | Refills: 2 | Status: SHIPPED | OUTPATIENT
Start: 2025-08-19

## 2025-08-22 ENCOUNTER — PATIENT MESSAGE (OUTPATIENT)
Dept: FAMILY MEDICINE | Facility: CLINIC | Age: 65
End: 2025-08-22
Payer: MEDICARE

## 2025-08-22 DIAGNOSIS — E78.5 HYPERLIPIDEMIA, UNSPECIFIED HYPERLIPIDEMIA TYPE: Primary | ICD-10-CM

## (undated) DEVICE — KIT HEART ANGIO MFLD LEFT RUSH

## (undated) DEVICE — PROTECTOR ULNAR NERVE FOAM

## (undated) DEVICE — TOWEL OR DISP STRL BLUE 4/PK

## (undated) DEVICE — SOL IRR SOD CHL .9% POUR

## (undated) DEVICE — CANNULA NASAL NONFLARE TIP 7FT

## (undated) DEVICE — GLOVE PROTEXIS PI CRM 5.5

## (undated) DEVICE — SYR MED RAD 150ML

## (undated) DEVICE — GLOVE PROTEXIS PI CRM 6.5

## (undated) DEVICE — SET IV PRIMARY

## (undated) DEVICE — Device

## (undated) DEVICE — DECANTER FLUID TRNSF WHITE 9IN

## (undated) DEVICE — OXISENSOR NEONATAL/ADULT N/S

## (undated) DEVICE — CONTRAST ISOVUE 370 100ML

## (undated) DEVICE — NDL PERCUTANEOUS ENTRYBSDN 18

## (undated) DEVICE — CHLORAPREP 10.5 ML APPLICATOR

## (undated) DEVICE — DRESSING TRANS 4X4 TEGADERM

## (undated) DEVICE — SET EXTENSION CLEARLINK 2INJ

## (undated) DEVICE — GLOVE SURG BIOGEL LATEX SZ 7.5

## (undated) DEVICE — CATH IMPULSE 6FR MULTI-PAK

## (undated) DEVICE — INTRODUCER CATH 6F 11CM